# Patient Record
Sex: MALE | Race: BLACK OR AFRICAN AMERICAN | NOT HISPANIC OR LATINO | Employment: UNEMPLOYED | URBAN - METROPOLITAN AREA
[De-identification: names, ages, dates, MRNs, and addresses within clinical notes are randomized per-mention and may not be internally consistent; named-entity substitution may affect disease eponyms.]

---

## 2018-01-12 NOTE — MISCELLANEOUS
Provider Comments  Provider Comments:   Dear Tish Cam,    You missed your appointment with Dr Duke Neff on 10/10/2016; please contact our office to reschedule at [phone]  We understand that many situations arise that occasionally prevents patients from keeping scheduled appointments  It is the policy of [address] [place] [address] [facility] that patients notify us 24 hours in advance if unable to keep a scheduled appointment  Missed appointments jeopardize strong physician-patient relationships  The appointment you missed could have easily been made available to another patient if you had contacted us to cancel  We like to accommodate all of our patients, but when patients miss an appointment it prevents us from being able to help everyone  In the future, we request at least 24 hours' notice of cancellation so we can make your appointment available to someone else in need  Sincerely,  The Physicians and Staff of [address] [place] [address] [facility]        Signatures   Electronically signed by :  Kimberli Santos, ; Oct 10 2016  3:29PM EST                       (Author)

## 2018-01-15 NOTE — RESULT NOTES
Message   biopsy confirmed esophageal ulcer and gastritis  need non-urgent outpatient follow up appointment  please eschedule  thanks  Verified Results  (1) TISSUE EXAM [id number] 03:04PM Nohemi Khalil     Test Name Result Flag Reference   LAB AP CASE REPORT (Report)     Surgical Pathology Report             Case: [id number]                   Authorizing Provider: Loida Ashton MD       Collected:      09/07/2016 1504        Ordering Location:   [facility]    Received:      09/07/2016 [address] [place] [address] Endoscopy                              Pathologist:      Irene Smith MD                                Specimens:  A) - Stomach, gastric bx                                        B) - Esophagus, bx distal esophagus                                  C) - Esophagus, bx upper esophagus,r/o cmv/hsv   LAB AP FINAL DIAGNOSIS (Report)     A  Stomach, gastric biopsy:  -Benign gastric-antral type mucosa with mild chronic inflammation and   reactive hyperplastic surface epithelial changes, consistent with   reactive/ chemical gastritis   -No evidence of ulceration   -No evidence of dysplasia or malignancy   -No H Pylori organisms identified on immunohistochemical stained slide  Control reacted appropriately  B  Esophagus, bx distal esophagus biopsy:  -Fragments of necroinflammatory exudate consistent with an ulcer   -Adjacent fragments of gastroesophageal junction mucosa with moderate   acute, chronic inflammation, ulceration and reactive epithelial changes   -No evidence of dysplasia or malignancy  Note:  Special stains for fungal organisms will be ordered, the results will be   issued in the final report  C  Esophagus, bx upper esophagus,r/o cmv/hsv biopsy:  -Benign squamous epithelium with mild chronic inflammation   -No evidence of Goblet cell metaplasia/ Preciado esophagus   -No evidence of glandular dysplasia or malignancy      Note:  Special stains for fungal & viral organisms will be ordered, the results   will be issued in the final report  Electronically signed by Jessica Valera MD on 9/9/2016 at 2:22 PM   LAB AP SURGICAL ADDITIONAL INFORMATION (Report)     These tests were developed and their performance characteristics   determined by [facility] or [address] [place] [address]  They may not be cleared or approved by the [place]  Food and   Drug Administration  The FDA has determined that such clearance or   approval is not necessary  These tests are used for clinical purposes  They should not be regarded as investigational or for research  This   laboratory has been approved by [place] [address], designated as a high-complexity   laboratory and is qualified to perform these tests  LAB AP GROSS DESCRIPTION (Report)     A  The specimen is received in formalin, labeled with the patient's   name and hospital number, and is designated gastric biopsy  The   specimen consists of several, rubbery, tan-brown tissue fragments   measuring 0 9 x 0 8 x 0 2 cm in aggregate dimension  Entirely submitted  One cassette  B  The specimen is received in formalin, labeled with the patient's name   and hospital number, and is designated biopsy distal esophagus  The   specimen consists of several, rubbery, tan-brown tissue fragments   measuring 0 8 x 0 8 x 0 2 cm in aggregate dimension  Entirely submitted  One cassette  C  The specimen is received in formalin, labeled with the patient's name   and hospital number, and is designated biopsy upper esophagus rule out   CMV/HSV   The specimen consists of several, rubbery, tan-brown tissue   fragments measuring 0 6 x 0 6 x 0 2 cm in aggregate dimension  Entirely   submitted  One cassette  Note: The estimated total formalin fixation time based upon information   provided by the submitting clinician and the standard processing schedule   is 37 25 hours      [facility]   LAB AP ADDENDUM 1 (Report)     The purpose of this supplemental report is to add the result of   immunostains performed on the upper esophagus biopsy    Many fungal organisms morphologically consistent with Candida, consistent   with Candida esophagitis   -No viral organisms seen on CMV, HSV I/II, stains   Controls reacted   appropriately  Addendum electronically signed by Adi Castellon MD on 9/12/2016 at 2:59 PM

## 2018-07-28 ENCOUNTER — APPOINTMENT (EMERGENCY)
Dept: CT IMAGING | Facility: HOSPITAL | Age: 53
End: 2018-07-28
Payer: COMMERCIAL

## 2018-07-28 ENCOUNTER — HOSPITAL ENCOUNTER (EMERGENCY)
Facility: HOSPITAL | Age: 53
Discharge: HOME | End: 2018-07-28
Attending: EMERGENCY MEDICINE | Admitting: EMERGENCY MEDICINE
Payer: COMMERCIAL

## 2018-07-28 VITALS
RESPIRATION RATE: 16 BRPM | SYSTOLIC BLOOD PRESSURE: 159 MMHG | DIASTOLIC BLOOD PRESSURE: 72 MMHG | HEART RATE: 90 BPM | TEMPERATURE: 97.7 F | BODY MASS INDEX: 17.33 KG/M2 | WEIGHT: 109 LBS | OXYGEN SATURATION: 100 %

## 2018-07-28 DIAGNOSIS — R19.7 DIARRHEA: ICD-10-CM

## 2018-07-28 DIAGNOSIS — R11.2 NAUSEA AND VOMITING: ICD-10-CM

## 2018-07-28 DIAGNOSIS — R10.9 ABDOMINAL PAIN: Primary | ICD-10-CM

## 2018-07-28 LAB
ALBUMIN SERPL BCP-MCNC: 4.1 G/DL (ref 3.5–5)
ALP SERPL-CCNC: 104 U/L (ref 46–116)
ALT SERPL W P-5'-P-CCNC: 53 U/L (ref 12–78)
ANION GAP SERPL CALCULATED.3IONS-SCNC: 12 MMOL/L (ref 4–13)
AST SERPL W P-5'-P-CCNC: 42 U/L (ref 5–45)
BASOPHILS # BLD AUTO: 0.04 THOUSANDS/ΜL (ref 0–0.1)
BASOPHILS NFR BLD AUTO: 1 % (ref 0–1)
BILIRUB SERPL-MCNC: 1.11 MG/DL (ref 0.2–1)
BUN SERPL-MCNC: 14 MG/DL (ref 5–25)
CALCIUM SERPL-MCNC: 9.9 MG/DL (ref 8.3–10.1)
CHLORIDE SERPL-SCNC: 97 MMOL/L (ref 100–108)
CO2 SERPL-SCNC: 27 MMOL/L (ref 21–32)
CREAT SERPL-MCNC: 1.04 MG/DL (ref 0.6–1.3)
EOSINOPHIL # BLD AUTO: 0.04 THOUSAND/ΜL (ref 0–0.61)
EOSINOPHIL NFR BLD AUTO: 1 % (ref 0–6)
ERYTHROCYTE [DISTWIDTH] IN BLOOD BY AUTOMATED COUNT: 13.2 % (ref 11.6–15.1)
GFR SERPL CREATININE-BSD FRML MDRD: 94 ML/MIN/1.73SQ M
GLUCOSE SERPL-MCNC: 220 MG/DL (ref 65–140)
HCT VFR BLD AUTO: 37.7 % (ref 36.5–49.3)
HGB BLD-MCNC: 13.3 G/DL (ref 12–17)
LIPASE SERPL-CCNC: 746 U/L (ref 73–393)
LYMPHOCYTES # BLD AUTO: 1.29 THOUSANDS/ΜL (ref 0.6–4.47)
LYMPHOCYTES NFR BLD AUTO: 19 % (ref 14–44)
MCH RBC QN AUTO: 31.9 PG (ref 26.8–34.3)
MCHC RBC AUTO-ENTMCNC: 35.3 G/DL (ref 31.4–37.4)
MCV RBC AUTO: 90 FL (ref 82–98)
MONOCYTES # BLD AUTO: 0.48 THOUSAND/ΜL (ref 0.17–1.22)
MONOCYTES NFR BLD AUTO: 7 % (ref 4–12)
NEUTROPHILS # BLD AUTO: 4.98 THOUSANDS/ΜL (ref 1.85–7.62)
NEUTS SEG NFR BLD AUTO: 73 % (ref 43–75)
PLATELET # BLD AUTO: 203 THOUSANDS/UL (ref 149–390)
PMV BLD AUTO: 12 FL (ref 8.9–12.7)
POTASSIUM SERPL-SCNC: 3.9 MMOL/L (ref 3.5–5.3)
PROT SERPL-MCNC: 8.9 G/DL (ref 6.4–8.2)
RBC # BLD AUTO: 4.17 MILLION/UL (ref 3.88–5.62)
SODIUM SERPL-SCNC: 136 MMOL/L (ref 136–145)
TROPONIN I SERPL-MCNC: <0.02 NG/ML
WBC # BLD AUTO: 6.83 THOUSAND/UL (ref 4.31–10.16)

## 2018-07-28 PROCEDURE — 85025 COMPLETE CBC W/AUTO DIFF WBC: CPT | Performed by: EMERGENCY MEDICINE

## 2018-07-28 PROCEDURE — 83690 ASSAY OF LIPASE: CPT | Performed by: EMERGENCY MEDICINE

## 2018-07-28 PROCEDURE — 80053 COMPREHEN METABOLIC PANEL: CPT | Performed by: EMERGENCY MEDICINE

## 2018-07-28 PROCEDURE — 99284 EMERGENCY DEPT VISIT MOD MDM: CPT

## 2018-07-28 PROCEDURE — 96374 THER/PROPH/DIAG INJ IV PUSH: CPT

## 2018-07-28 PROCEDURE — 96361 HYDRATE IV INFUSION ADD-ON: CPT

## 2018-07-28 PROCEDURE — 84484 ASSAY OF TROPONIN QUANT: CPT | Performed by: EMERGENCY MEDICINE

## 2018-07-28 PROCEDURE — 93005 ELECTROCARDIOGRAM TRACING: CPT

## 2018-07-28 PROCEDURE — 36415 COLL VENOUS BLD VENIPUNCTURE: CPT | Performed by: EMERGENCY MEDICINE

## 2018-07-28 PROCEDURE — 74177 CT ABD & PELVIS W/CONTRAST: CPT

## 2018-07-28 PROCEDURE — 96375 TX/PRO/DX INJ NEW DRUG ADDON: CPT

## 2018-07-28 RX ORDER — KETOROLAC TROMETHAMINE 30 MG/ML
15 INJECTION, SOLUTION INTRAMUSCULAR; INTRAVENOUS ONCE
Status: COMPLETED | OUTPATIENT
Start: 2018-07-28 | End: 2018-07-28

## 2018-07-28 RX ORDER — NAPROXEN 500 MG/1
500 TABLET ORAL 2 TIMES DAILY WITH MEALS
Qty: 30 TABLET | Refills: 0 | Status: SHIPPED | OUTPATIENT
Start: 2018-07-28 | End: 2018-08-10

## 2018-07-28 RX ORDER — ONDANSETRON 2 MG/ML
4 INJECTION INTRAMUSCULAR; INTRAVENOUS ONCE
Status: COMPLETED | OUTPATIENT
Start: 2018-07-28 | End: 2018-07-28

## 2018-07-28 RX ORDER — FAMOTIDINE 20 MG/1
20 TABLET, FILM COATED ORAL DAILY
Status: ON HOLD | COMMUNITY
End: 2018-08-16

## 2018-07-28 RX ORDER — METOCLOPRAMIDE 10 MG/1
10 TABLET ORAL EVERY 6 HOURS
Qty: 10 TABLET | Refills: 0 | Status: SHIPPED | OUTPATIENT
Start: 2018-07-28 | End: 2018-08-10

## 2018-07-28 RX ORDER — MIRTAZAPINE 15 MG/1
15 TABLET, FILM COATED ORAL
Status: ON HOLD | COMMUNITY
End: 2018-08-16

## 2018-07-28 RX ORDER — PANTOPRAZOLE SODIUM 40 MG/1
40 TABLET, DELAYED RELEASE ORAL 2 TIMES DAILY
COMMUNITY
End: 2018-08-16 | Stop reason: HOSPADM

## 2018-07-28 RX ADMIN — IOHEXOL 100 ML: 350 INJECTION, SOLUTION INTRAVENOUS at 10:20

## 2018-07-28 RX ADMIN — KETOROLAC TROMETHAMINE 15 MG: 30 INJECTION, SOLUTION INTRAMUSCULAR at 09:19

## 2018-07-28 RX ADMIN — SODIUM CHLORIDE 1000 ML: 0.9 INJECTION, SOLUTION INTRAVENOUS at 09:16

## 2018-07-28 RX ADMIN — ONDANSETRON 4 MG: 2 INJECTION INTRAMUSCULAR; INTRAVENOUS at 09:19

## 2018-07-28 NOTE — DISCHARGE INSTRUCTIONS
Pancreatitis   WHAT YOU NEED TO KNOW:   Pancreatitis is inflammation of your pancreas  The pancreas is an organ that makes insulin  It also makes enzymes (digestive juices) that help your body digest food  Pancreatitis may be an acute (short-term) problem that happens only once  It may become a chronic (long-term) problem that comes and goes over time  DISCHARGE INSTRUCTIONS:   Return to the emergency department if:   · You have severe pain in your abdomen and you are vomiting  Contact your healthcare provider if:   · You have a fever  · You continue to lose weight  · Your skin or the whites of your eyes turn yellow  · You have questions or concerns about your condition or care  Medicines: You may need any of the following:  · Antibiotics  treat a bacterial infection  · Prescription pain medicine  may be given  Ask your healthcare provider how to take this medicine safely  Some prescription pain medicines contain acetaminophen  Do not take other medicines that contain acetaminophen without talking to your healthcare provider  Too much acetaminophen may cause liver damage  Prescription pain medicine may cause constipation  Ask your healthcare provider how to prevent or treat constipation  · Take your medicine as directed  Contact your healthcare provider if you think your medicine is not helping or if you have side effects  Tell him or her if you are allergic to any medicine  Keep a list of the medicines, vitamins, and herbs you take  Include the amounts, and when and why you take them  Bring the list or the pill bottles to follow-up visits  Carry your medicine list with you in case of an emergency  Self-care:   · Rest  when you feel it is needed  Slowly start to do more each day  Return to your usual activities as directed  · Do not drink any alcohol    If you need help to stop drinking, contact the following organization:   ¨ Alcoholics Anonymous  Web Address: http://www mixon info/      · Ask your healthcare provider or dietitian about the best foods to eat  You may need to eat foods that are low in fat if you have chronic pancreatitis  Follow up with your healthcare provider as directed:  Write down your questions so you remember to ask them during your visits  © 2017 [address] [employer] Information is for End User's use only and may not be sold, redistributed or otherwise used for commercial purposes  All illustrations and images included in CareNotes® are the copyrighted property of [employer]  or [employer]  The above information is an  only  It is not intended as medical advice for individual conditions or treatments  Talk to your doctor, nurse or pharmacist before following any medical regimen to see if it is safe and effective for you

## 2018-07-28 NOTE — ED PROVIDER NOTES
History  Chief Complaint   Patient presents with    Pain     Reports falling yesterday, while trying to get the bus  He hurt his R elbow and R knee  Also c/o of "diabetes problems " C/o pain in his "feet, blood problems, pain all over" and also has left side pain  Has n/v/d, SOB, CP      45 y o  M w/DM p/w left flank pain x 3 weeks  Pt states he's here for pain, but then describes his pain as a "numbness"        History provided by:  Patient   used: No    Flank Pain   Pain location:  L flank  Pain quality comment:  "numbness"  Duration:  3 weeks  Timing:  Constant  Progression:  Unchanged  Chronicity:  New  Relieved by:  None tried  Worsened by:  Nothing  Ineffective treatments:  None tried  Associated symptoms: diarrhea, nausea and vomiting    Associated symptoms: no chills, no constipation, no cough, no dysuria, no fever, no hematuria and no sore throat    Risk factors: has not had multiple surgeries        Prior to Admission Medications   Prescriptions Last Dose Informant Patient Reported? Taking? amLODIPine (NORVASC) 5 mg tablet   Yes Yes   Sig: TAKE 1 TABLET (10 MG TOTAL) BY MOUTH DAILY  atorvastatin (LIPITOR) 40 mg tablet   Yes No   Sig: TAKE 1 TABLET (40 MG TOTAL) BY MOUTH NIGHTLY  TO BETTER CONTROL YOUR CHOLESTEROL   citalopram (CELEXA) 20 mg tablet   Yes Yes   Sig: Take 20 mg by mouth     famotidine (PEPCID) 20 mg tablet   Yes Yes   Sig: Take 20 mg by mouth daily   metFORMIN (GLUCOPHAGE) 500 mg tablet   Yes Yes   Sig: Take 1,000 mg by mouth 2 (two) times a day with meals   metoprolol tartrate (LOPRESSOR) 50 mg tablet   Yes Yes   Sig: TAKE 1 TABLET (50 MG TOTAL) BY MOUTH DAILY   mirtazapine (REMERON) 15 mg tablet   Yes Yes   Sig: Take 15 mg by mouth daily at bedtime   pantoprazole (PROTONIX) 40 mg tablet   Yes Yes   Sig: Take 40 mg by mouth 2 (two) times a day      Facility-Administered Medications: None       Past Medical History:   Diagnosis Date    [facility]     Hyperlipidemia     Hypertension     Migraine        Past Surgical History:   Procedure Laterality Date    ESOPHAGOGASTRODUODENOSCOPY N/A 9/7/2016    Procedure: ESOPHAGOGASTRODUODENOSCOPY (EGD); Surgeon: Cody Penny MD;  Location: [facility]; Service:        History reviewed  No pertinent family history  I have reviewed and agree with the history as documented  Social History   Substance Use Topics    Smoking status: Former Smoker    Smokeless tobacco: Never Used    Alcohol use No        Review of Systems   Constitutional: Negative for chills and fever  HENT: Negative for rhinorrhea and sore throat  Respiratory: Negative for cough  Gastrointestinal: Positive for diarrhea, nausea and vomiting  Negative for abdominal distention, abdominal pain, anal bleeding, blood in stool, constipation and rectal pain  Genitourinary: Positive for flank pain (left)  Negative for dysuria, frequency, hematuria and urgency  Musculoskeletal: Negative for back pain  Skin: Negative for pallor and rash  All other systems reviewed and are negative  Physical Exam  Physical Exam   Constitutional: He is oriented to person, place, and time  He appears well-developed and well-nourished  No distress  HENT:   Head: Normocephalic  Mouth/Throat: Oropharynx is clear and moist and mucous membranes are normal    Neck: Normal range of motion  Neck supple  Cardiovascular: Normal rate, regular rhythm, normal heart sounds and intact distal pulses  Exam reveals no gallop and no friction rub  No murmur heard  Pulmonary/Chest: Effort normal and breath sounds normal  No respiratory distress  He has no wheezes  He has no rales  Abdominal: Soft  Normal appearance and bowel sounds are normal  He exhibits no distension and no mass  There is no hepatosplenomegaly  There is no tenderness  There is no rigidity, no rebound, no guarding, no CVA tenderness, no tenderness at McBurney's point and negative Leon's sign  Lymphadenopathy:     He has no cervical adenopathy  Neurological: He is alert and oriented to person, place, and time  Skin: Skin is warm, dry and intact  No rash noted  No pallor  Nursing note and vitals reviewed  Vital Signs  ED Triage Vitals [07/28/18 0846]   Temperature Pulse Respirations Blood Pressure SpO2   97 7 °F (36 5 °C) (!) 118 18 142/65 100 %      Temp Source Heart Rate Source Patient Position - Orthostatic VS BP Location FiO2 (%)   Oral Monitor Sitting Left arm --      Pain Score       Worst Possible Pain           Vitals:    07/28/18 0846 07/28/18 1108   BP: 142/65 159/72   Pulse: (!) 118 90   Patient Position - Orthostatic VS: Sitting Lying       Visual Acuity      ED Medications  Medications   sodium chloride 0 9 % bolus 1,000 mL (0 mL Intravenous Stopped 7/28/18 1111)   ondansetron (ZOFRAN) injection 4 mg (4 mg Intravenous Given 7/28/18 0919)   ketorolac (TORADOL) injection 15 mg (15 mg Intravenous Given 7/28/18 0919)   iohexol (OMNIPAQUE) 350 MG/ML injection (SINGLE-DOSE) 100 mL (100 mL Intravenous Given 7/28/18 1020)       Diagnostic Studies  Results Reviewed     Procedure Component Value Units Date/Time    Comprehensive metabolic panel [[id number]]  (Abnormal) Collected:  07/28/18 0919    Lab Status:  Final result Specimen:  Blood from Arm, Left Updated:  07/28/18 1012     Sodium 136 mmol/L      Potassium 3 9 mmol/L      Chloride 97 (L) mmol/L      CO2 27 mmol/L      Anion Gap 12 mmol/L      BUN 14 mg/dL      Creatinine 1 04 mg/dL      Glucose 220 (H) mg/dL      Calcium 9 9 mg/dL      AST 42 U/L      ALT 53 U/L      Alkaline Phosphatase 104 U/L      Total Protein 8 9 (H) g/dL      Albumin 4 1 g/dL      Total Bilirubin 1 11 (H) mg/dL      eGFR 94 ml/min/1 73sq m     Narrative:         National Kidney Disease Education Program recommendations are as follows:  GFR calculation is accurate only with a steady state creatinine  Chronic Kidney disease less than 60 ml/min/1 73 sq  meters  Kidney failure less than 15 ml/min/1 73 sq  meters  Lipase [[id number]]  (Abnormal) Collected:  07/28/18 0919    Lab Status:  Final result Specimen:  Blood from Arm, Left Updated:  07/28/18 1012     Lipase 746 (H) u/L     Troponin I [[id number]]  (Normal) Collected:  07/28/18 0919    Lab Status:  Final result Specimen:  Blood from Arm, Left Updated:  07/28/18 0959     Troponin I <0 02 ng/mL     CBC and differential [[id number]]  (Normal) Collected:  07/28/18 0919    Lab Status:  Final result Specimen:  Blood from Arm, Left Updated:  07/28/18 0941     WBC 6 83 Thousand/uL      RBC 4 17 Million/uL      Hemoglobin 13 3 g/dL      Hematocrit 37 7 %      MCV 90 fL      MCH 31 9 pg      MCHC 35 3 g/dL      RDW 13 2 %      MPV 12 0 fL      Platelets 112 Thousands/uL      Neutrophils Relative 73 %      Lymphocytes Relative 19 %      Monocytes Relative 7 %      Eosinophils Relative 1 %      Basophils Relative 1 %      Neutrophils Absolute 4 98 Thousands/µL      Lymphocytes Absolute 1 29 Thousands/µL      Monocytes Absolute 0 48 Thousand/µL      Eosinophils Absolute 0 04 Thousand/µL      Basophils Absolute 0 04 Thousands/µL                  CT abdomen pelvis with contrast   Final Result by Samuel Sanchez MD (07/28 1047)      No acute intra-abdominal pelvic abnormality identified  Stable hepatic cavernous hemangiomas  Several small hypodensities within the renal cortices most likely representing cysts  Single indeterminate nodule with questionable enhancement  Too small to more accurately characterize  Follow-up in 6-12 months suggested  Oval hypodensity also noted within the spleen with enhancement  This appears new compared to the prior studies  Most commonly secondary to hemangioma  Given this is new since 2013, suggest nonemergent ultrasound to further characterize              Workstation performed: [id number]                    Procedures  ECG 12 Lead Documentation  Date/Time: 7/28/2018 9:13 AM  Performed by: Viktoria Crowell  Authorized by: Viktoria Crowell     Indications / Diagnosis:  Left flank pain  Previous ECG:     Previous ECG:  Compared to current    Comparison ECG info:  6/5/2016  Rate:     ECG rate:  99  Rhythm:     Rhythm: sinus rhythm    Ectopy:     Ectopy: none    QRS:     QRS axis:  Normal    QRS intervals:  Normal  ST segments:     ST segments:  Normal           Phone Contacts  ED Phone Contact    ED Course  ED Course as of Jul 28 1115   Sat Jul 28, 2018   1102 Discussed results with pt  I instructed him to make a f/u appt with his PCP regarding his spleen cyst to have an US  Pt agrees            HEART Risk Score      Most Recent Value   History  0 Filed at: 07/28/2018 1002   ECG  0 Filed at: 07/28/2018 1002   Age  1 Filed at: 07/28/2018 1002   Risk Factors  1 Filed at: 07/28/2018 1002   Troponin  0 Filed at: 07/28/2018 1002   Heart Score Risk Calculator   History  0 Filed at: 07/28/2018 1002   ECG  0 Filed at: 07/28/2018 1002   Age  1 Filed at: 07/28/2018 1002   Risk Factors  1 Filed at: 07/28/2018 1002   Troponin  0 Filed at: 07/28/2018 1002   HEART Score  2 Filed at: 07/28/2018 1002   HEART Score  2 Filed at: 07/28/2018 [address] [place] [address]  Number of Diagnoses or Management Options     Amount and/or Complexity of Data Reviewed  Clinical lab tests: reviewed and ordered  Tests in the radiology section of CPT®: ordered and reviewed  Tests in the medicine section of CPT®: reviewed and ordered      CritCare Time    Disposition  Final diagnoses:   Abdominal pain   Nausea and vomiting   Diarrhea     Time reflects when diagnosis was documented in both MDM as applicable and the Disposition within this note     Time User Action Codes Description Comment    7/28/2018 11:02 AM Jamil Strauss 46 [R10 9] Abdominal pain     7/28/2018 11:03 AM Lyla Strauss Add [R11 2] Nausea and vomiting     7/28/2018 11:03 AM Lyla Strauss Add [R19 7] Diarrhea       ED Disposition     ED Disposition Condition Comment    Discharge  Lethia Finders discharge to home/self care  Condition at discharge: Good        Follow-up Information     Follow up With Specialties Details Why Contact Info    An Casarez DO Family Medicine Schedule an appointment as soon as possible for a visit For an ultrasound of your spleen to assess the abnormality on CT scan [address] [facility] [address]  [phone]            Patient's Medications   Discharge Prescriptions    METOCLOPRAMIDE (REGLAN) 10 MG TABLET    Take 1 tablet (10 mg total) by mouth every 6 (six) hours       Start Date: 7/28/2018 End Date: --       Order Dose: 10 mg       Quantity: 10 tablet    Refills: 0    NAPROXEN (NAPROSYN) 500 MG TABLET    Take 1 tablet (500 mg total) by mouth 2 (two) times a day with meals       Start Date: 7/28/2018 End Date: --       Order Dose: 500 mg       Quantity: 30 tablet    Refills: 0     No discharge procedures on file      ED Provider  Electronically Signed by           Jass Horta DO  07/28/18 3112

## 2018-07-31 LAB
ATRIAL RATE: 99 BPM
P AXIS: 66 DEGREES
PR INTERVAL: 190 MS
QRS AXIS: 62 DEGREES
QRSD INTERVAL: 82 MS
QT INTERVAL: 330 MS
QTC INTERVAL: 423 MS
T WAVE AXIS: 48 DEGREES
VENTRICULAR RATE: 99 BPM

## 2018-07-31 PROCEDURE — 93010 ELECTROCARDIOGRAM REPORT: CPT | Performed by: INTERNAL MEDICINE

## 2018-08-10 ENCOUNTER — HOSPITAL ENCOUNTER (INPATIENT)
Facility: HOSPITAL | Age: 53
LOS: 6 days | Discharge: HOME HEALTH | DRG: 282 | End: 2018-08-16
Attending: EMERGENCY MEDICINE | Admitting: HOSPITALIST
Payer: COMMERCIAL

## 2018-08-10 DIAGNOSIS — R74.8 ELEVATED LIPASE: ICD-10-CM

## 2018-08-10 DIAGNOSIS — I10 ACCELERATED HYPERTENSION: ICD-10-CM

## 2018-08-10 DIAGNOSIS — R79.89 ACETONEMIA: ICD-10-CM

## 2018-08-10 DIAGNOSIS — D64.9 ANEMIA: ICD-10-CM

## 2018-08-10 DIAGNOSIS — E11.40 NEUROPATHY DUE TO TYPE 2 DIABETES MELLITUS (HCC): ICD-10-CM

## 2018-08-10 DIAGNOSIS — R53.1 GENERALIZED WEAKNESS: ICD-10-CM

## 2018-08-10 DIAGNOSIS — R26.2 AMBULATORY DYSFUNCTION: Chronic | ICD-10-CM

## 2018-08-10 DIAGNOSIS — E11.65 TYPE 2 DIABETES MELLITUS WITH HYPERGLYCEMIA, WITHOUT LONG-TERM CURRENT USE OF INSULIN (HCC): ICD-10-CM

## 2018-08-10 DIAGNOSIS — K85.90 PANCREATITIS: Primary | ICD-10-CM

## 2018-08-10 DIAGNOSIS — R73.9 HYPERGLYCEMIA: ICD-10-CM

## 2018-08-10 DIAGNOSIS — B36.9 FUNGAL INFECTION OF SKIN: ICD-10-CM

## 2018-08-10 DIAGNOSIS — N39.0 UTI (URINARY TRACT INFECTION): ICD-10-CM

## 2018-08-10 PROBLEM — E78.49 OTHER HYPERLIPIDEMIA: Status: ACTIVE | Noted: 2018-08-10

## 2018-08-10 PROBLEM — E78.49 OTHER HYPERLIPIDEMIA: Chronic | Status: ACTIVE | Noted: 2018-08-10

## 2018-08-10 LAB
ACETONE SERPL-MCNC: ABNORMAL MG/DL
ALBUMIN SERPL BCP-MCNC: 4.2 G/DL (ref 3–5.2)
ALP SERPL-CCNC: 110 U/L (ref 43–122)
ALT SERPL W P-5'-P-CCNC: 51 U/L (ref 9–52)
ANION GAP SERPL CALCULATED.3IONS-SCNC: 7 MMOL/L (ref 5–14)
AST SERPL W P-5'-P-CCNC: 46 U/L (ref 17–59)
BACTERIA UR QL AUTO: ABNORMAL /HPF
BASE EX.OXY STD BLDV CALC-SCNC: 49.2 %
BASE EXCESS BLDV CALC-SCNC: 4 MMOL/L (ref -2.1–2.1)
BILIRUB SERPL-MCNC: 1 MG/DL
BILIRUB UR QL STRIP: NEGATIVE
BUN SERPL-MCNC: 14 MG/DL (ref 5–25)
CALCIUM SERPL-MCNC: 9.5 MG/DL (ref 8.4–10.2)
CHLORIDE SERPL-SCNC: 100 MMOL/L (ref 97–108)
CLARITY UR: ABNORMAL
CO2 SERPL-SCNC: 28 MMOL/L (ref 22–30)
COLOR UR: ABNORMAL
CREAT SERPL-MCNC: 0.61 MG/DL (ref 0.7–1.5)
ERYTHROCYTE [DISTWIDTH] IN BLOOD BY AUTOMATED COUNT: 14.7 %
GFR SERPL CREATININE-BSD FRML MDRD: 131 ML/MIN/1.73SQ M
GLUCOSE SERPL-MCNC: 276 MG/DL (ref 70–99)
GLUCOSE UR STRIP-MCNC: ABNORMAL MG/DL
HCO3 BLDV-SCNC: 30.5 MMOL/L (ref 23–28)
HCT VFR BLD AUTO: 36.5 % (ref 41–53)
HGB BLD-MCNC: 12.3 G/DL (ref 13.5–17.5)
HGB UR QL STRIP.AUTO: 150
KETONES UR STRIP-MCNC: NEGATIVE MG/DL
LACTATE SERPL-SCNC: 1.1 MMOL/L (ref 0.7–2)
LEUKOCYTE ESTERASE UR QL STRIP: NEGATIVE
LIPASE SERPL-CCNC: 3534 U/L (ref 23–300)
MCH RBC QN AUTO: 32.7 PG (ref 26–34)
MCHC RBC AUTO-ENTMCNC: 33.8 G/DL (ref 31–36)
MCV RBC AUTO: 97 FL (ref 80–100)
NITRITE UR QL STRIP: POSITIVE
NON-SQ EPI CELLS URNS QL MICRO: ABNORMAL /HPF
O2 CT BLDV-SCNC: 7.9 ML/DL
PCO2 BLDV: 54 MM HG (ref 41–51)
PH BLDV: 7.36 [PH] (ref 7.35–7.45)
PH UR STRIP.AUTO: 6 [PH] (ref 4.5–8)
PLATELET # BLD AUTO: 201 THOUSANDS/UL (ref 150–450)
PMV BLD AUTO: 10.8 FL (ref 8.9–12.7)
PO2 BLDV: 24 MM HG
POTASSIUM SERPL-SCNC: 3.9 MMOL/L (ref 3.6–5)
PROT SERPL-MCNC: 8 G/DL (ref 5.9–8.4)
PROT UR STRIP-MCNC: >=500 MG/DL
RBC # BLD AUTO: 3.78 MILLION/UL (ref 4.5–5.9)
RBC #/AREA URNS AUTO: ABNORMAL /HPF
SODIUM SERPL-SCNC: 135 MMOL/L (ref 137–147)
SP GR UR STRIP.AUTO: 1.01 (ref 1–1.04)
TRIGL SERPL-MCNC: 145 MG/DL
TROPONIN I SERPL-MCNC: <0.01 NG/ML (ref 0–0.03)
UROBILINOGEN UA: NEGATIVE MG/DL
WBC # BLD AUTO: 6.7 THOUSAND/UL (ref 4.5–11)
WBC #/AREA URNS AUTO: ABNORMAL /HPF

## 2018-08-10 PROCEDURE — 96360 HYDRATION IV INFUSION INIT: CPT

## 2018-08-10 PROCEDURE — 99223 1ST HOSP IP/OBS HIGH 75: CPT | Performed by: HOSPITALIST

## 2018-08-10 PROCEDURE — 85027 COMPLETE CBC AUTOMATED: CPT | Performed by: EMERGENCY MEDICINE

## 2018-08-10 PROCEDURE — 96361 HYDRATE IV INFUSION ADD-ON: CPT

## 2018-08-10 PROCEDURE — 36415 COLL VENOUS BLD VENIPUNCTURE: CPT | Performed by: EMERGENCY MEDICINE

## 2018-08-10 PROCEDURE — 83605 ASSAY OF LACTIC ACID: CPT | Performed by: EMERGENCY MEDICINE

## 2018-08-10 PROCEDURE — 81001 URINALYSIS AUTO W/SCOPE: CPT | Performed by: EMERGENCY MEDICINE

## 2018-08-10 PROCEDURE — 93005 ELECTROCARDIOGRAM TRACING: CPT

## 2018-08-10 PROCEDURE — 80053 COMPREHEN METABOLIC PANEL: CPT | Performed by: EMERGENCY MEDICINE

## 2018-08-10 PROCEDURE — 82948 REAGENT STRIP/BLOOD GLUCOSE: CPT

## 2018-08-10 PROCEDURE — 82805 BLOOD GASES W/O2 SATURATION: CPT | Performed by: EMERGENCY MEDICINE

## 2018-08-10 PROCEDURE — 84478 ASSAY OF TRIGLYCERIDES: CPT | Performed by: EMERGENCY MEDICINE

## 2018-08-10 PROCEDURE — 83690 ASSAY OF LIPASE: CPT | Performed by: EMERGENCY MEDICINE

## 2018-08-10 PROCEDURE — 82009 KETONE BODYS QUAL: CPT | Performed by: EMERGENCY MEDICINE

## 2018-08-10 PROCEDURE — 84484 ASSAY OF TROPONIN QUANT: CPT | Performed by: EMERGENCY MEDICINE

## 2018-08-10 PROCEDURE — 99285 EMERGENCY DEPT VISIT HI MDM: CPT

## 2018-08-10 PROCEDURE — 81003 URINALYSIS AUTO W/O SCOPE: CPT | Performed by: EMERGENCY MEDICINE

## 2018-08-10 RX ORDER — DOCUSATE SODIUM 100 MG/1
100 CAPSULE, LIQUID FILLED ORAL 2 TIMES DAILY
Status: DISCONTINUED | OUTPATIENT
Start: 2018-08-11 | End: 2018-08-16 | Stop reason: HOSPADM

## 2018-08-10 RX ORDER — ATORVASTATIN CALCIUM 40 MG/1
40 TABLET, FILM COATED ORAL
Status: DISCONTINUED | OUTPATIENT
Start: 2018-08-11 | End: 2018-08-12

## 2018-08-10 RX ORDER — POLYETHYLENE GLYCOL 3350 17 G/17G
17 POWDER, FOR SOLUTION ORAL DAILY PRN
Status: DISCONTINUED | OUTPATIENT
Start: 2018-08-10 | End: 2018-08-16 | Stop reason: HOSPADM

## 2018-08-10 RX ORDER — CITALOPRAM 20 MG/1
20 TABLET ORAL DAILY
Status: DISCONTINUED | OUTPATIENT
Start: 2018-08-11 | End: 2018-08-16 | Stop reason: HOSPADM

## 2018-08-10 RX ORDER — AMLODIPINE BESYLATE 5 MG/1
5 TABLET ORAL DAILY
Status: DISCONTINUED | OUTPATIENT
Start: 2018-08-11 | End: 2018-08-12

## 2018-08-10 RX ORDER — SODIUM CHLORIDE 9 MG/ML
75 INJECTION, SOLUTION INTRAVENOUS CONTINUOUS
Status: DISPENSED | OUTPATIENT
Start: 2018-08-10 | End: 2018-08-11

## 2018-08-10 RX ORDER — ONDANSETRON 2 MG/ML
4 INJECTION INTRAMUSCULAR; INTRAVENOUS EVERY 4 HOURS PRN
Status: DISCONTINUED | OUTPATIENT
Start: 2018-08-10 | End: 2018-08-16 | Stop reason: HOSPADM

## 2018-08-10 RX ORDER — 0.9 % SODIUM CHLORIDE 0.9 %
3 VIAL (ML) INJECTION AS NEEDED
Status: DISCONTINUED | OUTPATIENT
Start: 2018-08-10 | End: 2018-08-16 | Stop reason: HOSPADM

## 2018-08-10 RX ORDER — ACETAMINOPHEN 325 MG/1
650 TABLET ORAL EVERY 6 HOURS PRN
Status: DISCONTINUED | OUTPATIENT
Start: 2018-08-10 | End: 2018-08-16 | Stop reason: HOSPADM

## 2018-08-10 RX ORDER — PANTOPRAZOLE SODIUM 40 MG/1
40 INJECTION, POWDER, FOR SOLUTION INTRAVENOUS
Status: DISCONTINUED | OUTPATIENT
Start: 2018-08-11 | End: 2018-08-13

## 2018-08-10 RX ORDER — SENNOSIDES 8.6 MG
1 TABLET ORAL DAILY
Status: DISCONTINUED | OUTPATIENT
Start: 2018-08-11 | End: 2018-08-16 | Stop reason: HOSPADM

## 2018-08-10 RX ORDER — MIRTAZAPINE 15 MG/1
15 TABLET, FILM COATED ORAL
Status: DISCONTINUED | OUTPATIENT
Start: 2018-08-10 | End: 2018-08-16 | Stop reason: HOSPADM

## 2018-08-10 RX ORDER — ONDANSETRON 2 MG/ML
4 INJECTION INTRAMUSCULAR; INTRAVENOUS ONCE
Status: DISCONTINUED | OUTPATIENT
Start: 2018-08-10 | End: 2018-08-16 | Stop reason: HOSPADM

## 2018-08-10 RX ADMIN — METOPROLOL TARTRATE 25 MG: 25 TABLET, FILM COATED ORAL at 23:57

## 2018-08-10 RX ADMIN — HUMAN INSULIN 10 UNITS: 100 INJECTION, SOLUTION SUBCUTANEOUS at 21:28

## 2018-08-10 RX ADMIN — SODIUM CHLORIDE 1000 ML: 9 INJECTION, SOLUTION INTRAVENOUS at 19:15

## 2018-08-10 RX ADMIN — SODIUM CHLORIDE 1000 ML: 9 INJECTION, SOLUTION INTRAVENOUS at 21:30

## 2018-08-10 RX ADMIN — CEFTRIAXONE 1000 MG: 1 INJECTION, SOLUTION INTRAVENOUS at 21:29

## 2018-08-10 NOTE — ED PROVIDER NOTES
Pt Name: Kenji Garza  MRN: [id number]  [place] 1965  Age/Sex: 45 y o  male  Date of evaluation: 8/10/2018  PCP: Elmer Chapin DO    CHIEF COMPLAINT    Chief Complaint   Patient presents with    Dizziness    Weakness - Generalized    Fall         HPI    45 y o  male presenting with several weeks of dizziness, weakness, repeated falls  Patient states that he has diabetic neuropathy in his seen a doctor for that but that it is not getting better even with medication  He also notes nausea and vomiting today, only able to eat a bite or two of a sandwich today resulting in vomiting  Patient is a diabetic and has not taken insulin for months, stating that he has had some issues when his insulin changed in simply stop taking it  He has not been checking his blood sugar either  He also complains of mild dull abdominal pain, in the left upper quadrant and epigastric region, worse with meals and better at rest, nonradiating      HPI      Past Medical and Surgical History    Past Medical History:   Diagnosis Date    Diabetes mellitus ([address] )     GERD (gastroesophageal reflux disease)     Hyperlipidemia     Hypertension     Migraine     Psychiatric disorder        Past Surgical History:   Procedure Laterality Date    ESOPHAGOGASTRODUODENOSCOPY N/A 9/7/2016    Procedure: ESOPHAGOGASTRODUODENOSCOPY (EGD); Surgeon: Daniel King MD;  Location: [facility]; Service:        History reviewed  No pertinent family history  Social History   Substance Use Topics    Smoking status: Former Smoker    Smokeless tobacco: Never Used    Alcohol use No           Allergies    Allergies   Allergen Reactions    Lisinopril Swelling       Home Medications    Prior to Admission medications    Medication Sig Start Date End Date Taking? Authorizing Provider   amLODIPine (NORVASC) 5 mg tablet TAKE 1 TABLET (10 MG TOTAL) BY MOUTH DAILY   4/13/16   Historical Provider, MD   atorvastatin (LIPITOR) 40 mg tablet TAKE 1 TABLET (40 MG TOTAL) BY MOUTH NIGHTLY  TO BETTER CONTROL YOUR CHOLESTEROL 3/30/16   Historical Provider, MD   citalopram (CELEXA) 20 mg tablet Take 20 mg by mouth  7/5/13   Historical Provider, MD   famotidine (PEPCID) 20 mg tablet Take 20 mg by mouth daily    Historical Provider, MD   metFORMIN (GLUCOPHAGE) 500 mg tablet Take 1,000 mg by mouth 2 (two) times a day with meals    Historical Provider, MD   metoclopramide (REGLAN) 10 mg tablet Take 1 tablet (10 mg total) by mouth every 6 (six) hours 7/28/18   Lyla Strauss, DO   metoprolol tartrate (LOPRESSOR) 50 mg tablet TAKE 1 TABLET (50 MG TOTAL) BY MOUTH DAILY 3/21/16   Historical Provider, MD   mirtazapine (REMERON) 15 mg tablet Take 15 mg by mouth daily at bedtime    Historical Provider, MD   naproxen (NAPROSYN) 500 mg tablet Take 1 tablet (500 mg total) by mouth 2 (two) times a day with meals 7/28/18   Lyla Strauss, DO   pantoprazole (PROTONIX) 40 mg tablet Take 40 mg by mouth 2 (two) times a day    Historical Provider, MD           Review of Systems    Review of Systems   Constitutional: Negative for appetite change, chills and diaphoresis  HENT: Negative for drooling, facial swelling, trouble swallowing and voice change  Respiratory: Negative for apnea, shortness of breath and wheezing  Cardiovascular: Negative for chest pain and leg swelling  Gastrointestinal: Positive for abdominal pain, nausea and vomiting  Negative for abdominal distention and diarrhea  Genitourinary: Negative for dysuria and urgency  Musculoskeletal: Negative for arthralgias, back pain, gait problem and neck pain  Skin: Negative for color change, rash and wound  Neurological: Negative for seizures, speech difficulty, weakness and headaches  Psychiatric/Behavioral: Negative for agitation, behavioral problems and dysphoric mood  The patient is not nervous/anxious  All other systems reviewed and negative      Physical Exam      ED Triage Vitals   Temperature Pulse Respirations Blood Pressure SpO2   08/10/18 1812 08/10/18 1812 08/10/18 1812 08/10/18 1812 08/10/18 1812   (!) 97 4 °F (36 3 °C) 100 18 (!) 181/101 100 %      Temp Source Heart Rate Source Patient Position - Orthostatic VS BP Location FiO2 (%)   08/10/18 1812 08/10/18 1812 08/10/18 2200 08/10/18 1812 --   Temporal Monitor Lying Left arm       Pain Score       08/10/18 2228       8               Physical Exam   Constitutional: He is oriented to person, place, and time  He appears well-developed and well-nourished  HENT:   Head: Normocephalic and atraumatic  Mucous membranes dry   Eyes: Conjunctivae and EOM are normal  Pupils are equal, round, and reactive to light  Neck: Normal range of motion  Neck supple  No tracheal deviation present  Cardiovascular: Normal rate, regular rhythm, normal heart sounds and intact distal pulses  No murmur heard  Pulmonary/Chest: Effort normal and breath sounds normal  No stridor  No respiratory distress  He has no wheezes  He has no rales  Abdominal: Soft  He exhibits no distension  There is tenderness  There is no rebound and no guarding  Mild left upper quadrant tenderness   Musculoskeletal: Normal range of motion  He exhibits no edema or deformity  Neurological: He is alert and oriented to person, place, and time  Skin: Skin is warm and dry  No rash noted  Psychiatric: He has a normal mood and affect   His behavior is normal  Judgment and thought content normal             Diagnostic Results      Labs:    Results for orders placed or performed during the hospital encounter of 08/10/18   CBC   Result Value Ref Range    WBC 6 70 4 50 - 11 00 Thousand/uL    RBC 3 78 (L) 4 50 - 5 90 Million/uL    Hemoglobin 12 3 (L) 13 5 - 17 5 g/dL    Hematocrit 36 5 (L) 41 0 - 53 0 %    MCV 97 80 - 100 fL    MCH 32 7 26 0 - 34 0 pg    MCHC 33 8 31 0 - 36 0 g/dL    RDW 14 7 <15 3 %    Platelets 187 938 - 135 Thousands/uL    MPV 10 8 8 9 - 12 7 fL   Lipase   Result Value Ref Range Lipase 3,534 (H) 23 - 300 u/L   Acetone   Result Value Ref Range    Acetone, Bld 1+ (A) Negative   Comprehensive metabolic panel   Result Value Ref Range    Sodium 135 (L) 137 - 147 mmol/L    Potassium 3 9 3 6 - 5 0 mmol/L    Chloride 100 97 - 108 mmol/L    CO2 28 22 - 30 mmol/L    Anion Gap 7 5 - 14 mmol/L    BUN 14 5 - 25 mg/dL    Creatinine 0 61 (L) 0 70 - 1 50 mg/dL    Glucose 276 (H) 70 - 99 mg/dL    Calcium 9 5 8 4 - 10 2 mg/dL    AST 46 17 - 59 U/L    ALT 51 9 - 52 U/L    Alkaline Phosphatase 110 43 - 122 U/L    Total Protein 8 0 5 9 - 8 4 g/dL    Albumin 4 2 3 0 - 5 2 g/dL    Total Bilirubin 1 00 <1 30 mg/dL    eGFR 131 >60 ml/min/1 73sq m   Lactic acid, plasma   Result Value Ref Range    LACTIC ACID 1 1 0 7 - 2 0 mmol/L   Troponin I   Result Value Ref Range    Troponin I <0 01 0 00 - 0 03 ng/mL   UA w Reflex to Microscopic w Reflex to Culture   Result Value Ref Range    Color, UA Straw Straw, Yellow, Pale Yellow    Clarity, UA Slightly Cloudy (A) Clear, Other    Specific Gravity, UA 1 015 1 003 - 1 040    pH, UA 6 0 4 5 - 8 0    Leukocytes, UA Negative Negative    Nitrite, UA Positive (A) Negative    Protein, UA >=500 (A) Negative mg/dl    Glucose, UA >=1000 (1%) (A) Negative mg/dl    Ketones, UA Negative Negative mg/dl    Bilirubin, UA Negative Negative    Blood,  0 (A) Negative    UROBILINOGEN UA Negative 1 0, Negative mg/dL   Blood gas, venous   Result Value Ref Range    pH, Jones 7 360 7 350 - 7 450    pCO2, Jones 54 0 (H) 41 0 - 51 0 mm Hg    pO2, Jones 24 0 (L) >=40 0 mm Hg    HCO3, Jones 30 5 (H) 23 - 28 mmol/L    Base Excess, Jones 4 0 (H) -2 1 - 2 1 mmol/L    O2 Content, Jones 7 9 ml/dL    O2 HGB, VENOUS 49 2 (L) >75 0 %   Urine Microscopic   Result Value Ref Range    RBC, UA 0-1 (A) None Seen, 0-5 /hpf    WBC, UA 1-2 (A) None Seen, 0-5, 5-55, 5-65 /hpf    Epithelial Cells Occasional None Seen, Occasional /hpf    Bacteria, UA Innumerable (A) None Seen, Occasional /hpf   Triglycerides   Result Value Ref Range    Triglycerides 145 <150 mg/dL       All labs reviewed and utilized in the medical decision making process    Radiology:    No orders to display       All radiology studies independently viewed by me and interpreted by the radiologist     Procedure    Procedures    CritCare Time      ED Course of Care and Re-Assessments      Symptoms improved with saline, Zofran, insulin  Infected appearing UA with nitrate positive prompted ceftriaxone to treat for same      Medications   sodium chloride (PF) 0 9 % injection 3 mL (not administered)   ondansetron (ZOFRAN) injection 4 mg (not administered)   amLODIPine (NORVASC) tablet 5 mg (not administered)   atorvastatin (LIPITOR) tablet 40 mg (not administered)   citalopram (CeleXA) tablet 20 mg (not administered)   metoprolol tartrate (LOPRESSOR) tablet 25 mg (not administered)   mirtazapine (REMERON) tablet 15 mg (not administered)   sodium chloride 0 9 % infusion (not administered)   docusate sodium (COLACE) capsule 100 mg (not administered)   senna (SENOKOT) tablet 8 6 mg (not administered)   polyethylene glycol (MIRALAX) packet 17 g (not administered)   ondansetron (ZOFRAN) injection 4 mg (not administered)   enoxaparin (LOVENOX) subcutaneous injection 40 mg (not administered)   insulin lispro (HumaLOG) 100 units/mL subcutaneous injection 1-5 Units (not administered)   acetaminophen (TYLENOL) tablet 650 mg (not administered)   pantoprazole (PROTONIX) injection 40 mg (not administered)   sodium chloride 0 9 % bolus 1,000 mL (0 mL Intravenous Stopped 8/10/18 2208)   insulin regular (HumuLIN R,NovoLIN R) injection 10 Units (10 Units Subcutaneous Given 8/10/18 2128)   sodium chloride 0 9 % bolus 1,000 mL (1,000 mL Intravenous New Bag 8/10/18 2130)   cefTRIAXone (ROCEPHIN) IVPB (premix) 1,000 mg (0 mg Intravenous Stopped 8/10/18 2208)           FINAL IMPRESSION    Final diagnoses:   Pancreatitis   Hyperglycemia   UTI (urinary tract infection)   Anemia   Acetonemia DISPOSITION/PLAN    15-year-old male with history and symptoms above  Vital signs remarkable for hypertension and borderline tachycardia, examination nonspecific as above but concerning for  dehydration based on dry mucous membranes  Based on history and examination and in setting of diabetes with noncompliance, labs obtained and returned remarkable for multiple abnormalities as above to include acetone in blood without the presence of acidemia, UA concerning for UTI, anemia, and elevated lipase concerning for pancreatitis in setting of nausea vomiting and abdominal pain  Insulin and ceftriaxone administered in the ER along with 2 L of normal saline and Zofran  Patient admitted to Dr Jessica Gutierrez of Internal Medicine for further treatment of pancreatitis and re-initiation of insulin therapy, hemodynamically stable and comfortable at that time  Triglycerides ordered for emergency department per Dr Jessica Gutierrez request, that lab to be followed up by her on inpatient basis    Time reflects when diagnosis was documented in both MDM as applicable and the Disposition within this note     Time User Action Codes Description Comment    8/10/2018  9:12 PM Nolvia Worley Add [K85 90] Pancreatitis     8/10/2018  9:12 PM Nolvia Worley Add [R73 9] Hyperglycemia     8/10/2018  9:12 PM Nolvia Worley Add [N39 0] UTI (urinary tract infection)     8/10/2018  9:12 PM Nolvia Worley Add [D64 9] Anemia     8/10/2018  9:12 PM Nolvia Worley Add [R79 89] Acetonemia     8/10/2018 10:26 PM Alonso Mathew Add [E11 65] Type 2 diabetes mellitus with hyperglycemia, without long-term current use of insulin ([facility] [address] )     8/10/2018 10:26 PM Arun President Add [R74 8] Elevated lipase     8/10/2018 10:26 PM Natty Bloom Add [I10] Accelerated hypertension       ED Disposition     ED Disposition Condition Comment    Admit  Case was discussed with Dr Jessica Gutierrez and the patient's admission status was agreed to be Admission Status: inpatient status to the service of Dr David Osman   Follow-up Information    None           PATIENT REFERRED TO:    No follow-up provider specified  DISCHARGE MEDICATIONS:    Current Discharge Medication List      CONTINUE these medications which have NOT CHANGED    Details   amLODIPine (NORVASC) 5 mg tablet TAKE 1 TABLET (10 MG TOTAL) BY MOUTH DAILY  atorvastatin (LIPITOR) 40 mg tablet TAKE 1 TABLET (40 MG TOTAL) BY MOUTH NIGHTLY  TO BETTER CONTROL YOUR CHOLESTEROL      citalopram (CELEXA) 20 mg tablet Take 20 mg by mouth daily        famotidine (PEPCID) 20 mg tablet Take 20 mg by mouth daily      metFORMIN (GLUCOPHAGE) 500 mg tablet Take 1,000 mg by mouth 2 (two) times a day with meals      metoprolol tartrate (LOPRESSOR) 50 mg tablet TAKE 1 TABLET (50 MG TOTAL) BID      mirtazapine (REMERON) 15 mg tablet Take 15 mg by mouth daily at bedtime      pantoprazole (PROTONIX) 40 mg tablet Take 40 mg by mouth 2 (two) times a day             No discharge procedures on file           MD Nam Infante MD  08/10/18 9205

## 2018-08-10 NOTE — ED NOTES
Called patient's niece as per patient's request  Call unsuccessful, and voicemail was full for telephone number provided       Lo Denson RN  08/10/18 1821

## 2018-08-10 NOTE — ED TRIAGE NOTES
EMS states he has been dizzy, weak, falling a lot at home (per pt, last time he fell was last week), L flank into rib pain  Pt states he had some chest pain last night but states it could have been gas  Denies blood in urine or burning with urination   Pt states he doesn't want to go home, states he is tired of being sick

## 2018-08-11 LAB
ALBUMIN SERPL BCP-MCNC: 3.2 G/DL (ref 3–5.2)
ALP SERPL-CCNC: 81 U/L (ref 43–122)
ALT SERPL W P-5'-P-CCNC: 42 U/L (ref 9–52)
ANION GAP SERPL CALCULATED.3IONS-SCNC: 2 MMOL/L (ref 5–14)
AST SERPL W P-5'-P-CCNC: 39 U/L (ref 17–59)
BASOPHILS # BLD AUTO: 0 THOUSANDS/ΜL (ref 0–0.1)
BASOPHILS NFR BLD AUTO: 1 % (ref 0–1)
BILIRUB SERPL-MCNC: 0.6 MG/DL
BUN SERPL-MCNC: 10 MG/DL (ref 5–25)
CALCIUM SERPL-MCNC: 8.4 MG/DL (ref 8.4–10.2)
CHLORIDE SERPL-SCNC: 106 MMOL/L (ref 97–108)
CHOLEST SERPL-MCNC: 176 MG/DL
CO2 SERPL-SCNC: 29 MMOL/L (ref 22–30)
CREAT SERPL-MCNC: 0.59 MG/DL (ref 0.7–1.5)
EOSINOPHIL # BLD AUTO: 0 THOUSAND/ΜL (ref 0–0.4)
EOSINOPHIL NFR BLD AUTO: 1 % (ref 0–6)
ERYTHROCYTE [DISTWIDTH] IN BLOOD BY AUTOMATED COUNT: 14.5 %
EST. AVERAGE GLUCOSE BLD GHB EST-MCNC: 321 MG/DL
GFR SERPL CREATININE-BSD FRML MDRD: 133 ML/MIN/1.73SQ M
GLUCOSE SERPL-MCNC: 104 MG/DL (ref 70–99)
GLUCOSE SERPL-MCNC: 113 MG/DL (ref 70–99)
GLUCOSE SERPL-MCNC: 116 MG/DL (ref 70–99)
GLUCOSE SERPL-MCNC: 121 MG/DL (ref 70–99)
GLUCOSE SERPL-MCNC: 126 MG/DL (ref 70–99)
GLUCOSE SERPL-MCNC: 74 MG/DL (ref 70–99)
HBA1C MFR BLD: 12.8 % (ref 4.2–6.3)
HCT VFR BLD AUTO: 30.5 % (ref 41–53)
HDLC SERPL-MCNC: 46 MG/DL (ref 40–59)
HGB BLD-MCNC: 10.5 G/DL (ref 13.5–17.5)
INR PPP: 0.87 (ref 0.89–1.1)
LDLC SERPL CALC-MCNC: 115 MG/DL
LIPASE SERPL-CCNC: 4811 U/L (ref 23–300)
LYMPHOCYTES # BLD AUTO: 1.1 THOUSANDS/ΜL (ref 0.5–4)
LYMPHOCYTES NFR BLD AUTO: 18 % (ref 20–50)
MAGNESIUM SERPL-MCNC: 1.6 MG/DL (ref 1.6–2.3)
MCH RBC QN AUTO: 32.8 PG (ref 26–34)
MCHC RBC AUTO-ENTMCNC: 34.3 G/DL (ref 31–36)
MCV RBC AUTO: 96 FL (ref 80–100)
MONOCYTES # BLD AUTO: 0.4 THOUSAND/ΜL (ref 0.2–0.9)
MONOCYTES NFR BLD AUTO: 7 % (ref 1–10)
NEUTROPHILS # BLD AUTO: 4.5 THOUSANDS/ΜL (ref 1.8–7.8)
NEUTS SEG NFR BLD AUTO: 74 % (ref 45–65)
NONHDLC SERPL-MCNC: 130 MG/DL
PHOSPHATE SERPL-MCNC: 3.2 MG/DL (ref 2.5–4.8)
PLATELET # BLD AUTO: 168 THOUSANDS/UL (ref 150–450)
PLATELET # BLD AUTO: 220 THOUSANDS/UL (ref 150–450)
PMV BLD AUTO: 10.2 FL (ref 8.9–12.7)
POTASSIUM SERPL-SCNC: 3.4 MMOL/L (ref 3.6–5)
PROT SERPL-MCNC: 6.8 G/DL (ref 5.9–8.4)
PROTHROMBIN TIME: 9.3 SECONDS (ref 9.5–11.6)
RBC # BLD AUTO: 3.19 MILLION/UL (ref 4.5–5.9)
SODIUM SERPL-SCNC: 137 MMOL/L (ref 137–147)
TRIGL SERPL-MCNC: 74 MG/DL
TROPONIN I SERPL-MCNC: 0.02 NG/ML (ref 0–0.03)
TSH SERPL DL<=0.05 MIU/L-ACNC: 1.58 UIU/ML (ref 0.47–4.68)
WBC # BLD AUTO: 6.1 THOUSAND/UL (ref 4.5–11)

## 2018-08-11 PROCEDURE — 83690 ASSAY OF LIPASE: CPT | Performed by: HOSPITALIST

## 2018-08-11 PROCEDURE — 82948 REAGENT STRIP/BLOOD GLUCOSE: CPT

## 2018-08-11 PROCEDURE — 99232 SBSQ HOSP IP/OBS MODERATE 35: CPT | Performed by: HOSPITALIST

## 2018-08-11 PROCEDURE — 84100 ASSAY OF PHOSPHORUS: CPT | Performed by: HOSPITALIST

## 2018-08-11 PROCEDURE — 80053 COMPREHEN METABOLIC PANEL: CPT | Performed by: HOSPITALIST

## 2018-08-11 PROCEDURE — 85610 PROTHROMBIN TIME: CPT | Performed by: HOSPITALIST

## 2018-08-11 PROCEDURE — 84443 ASSAY THYROID STIM HORMONE: CPT | Performed by: HOSPITALIST

## 2018-08-11 PROCEDURE — 85049 AUTOMATED PLATELET COUNT: CPT | Performed by: HOSPITALIST

## 2018-08-11 PROCEDURE — C9113 INJ PANTOPRAZOLE SODIUM, VIA: HCPCS | Performed by: HOSPITALIST

## 2018-08-11 PROCEDURE — 85025 COMPLETE CBC W/AUTO DIFF WBC: CPT | Performed by: HOSPITALIST

## 2018-08-11 PROCEDURE — 84484 ASSAY OF TROPONIN QUANT: CPT | Performed by: HOSPITALIST

## 2018-08-11 PROCEDURE — 83036 HEMOGLOBIN GLYCOSYLATED A1C: CPT | Performed by: HOSPITALIST

## 2018-08-11 PROCEDURE — 80061 LIPID PANEL: CPT | Performed by: HOSPITALIST

## 2018-08-11 PROCEDURE — 83735 ASSAY OF MAGNESIUM: CPT | Performed by: HOSPITALIST

## 2018-08-11 RX ORDER — GABAPENTIN 100 MG/1
100 CAPSULE ORAL 2 TIMES DAILY
Status: DISCONTINUED | OUTPATIENT
Start: 2018-08-11 | End: 2018-08-15

## 2018-08-11 RX ORDER — TRAMADOL HYDROCHLORIDE 50 MG/1
50 TABLET ORAL EVERY 6 HOURS PRN
Status: DISCONTINUED | OUTPATIENT
Start: 2018-08-11 | End: 2018-08-13

## 2018-08-11 RX ORDER — SODIUM CHLORIDE 9 MG/ML
75 INJECTION, SOLUTION INTRAVENOUS CONTINUOUS
Status: DISPENSED | OUTPATIENT
Start: 2018-08-11 | End: 2018-08-12

## 2018-08-11 RX ORDER — HYDRALAZINE HYDROCHLORIDE 20 MG/ML
5 INJECTION INTRAMUSCULAR; INTRAVENOUS EVERY 6 HOURS PRN
Status: DISCONTINUED | OUTPATIENT
Start: 2018-08-11 | End: 2018-08-13

## 2018-08-11 RX ORDER — METOPROLOL TARTRATE 50 MG/1
50 TABLET, FILM COATED ORAL EVERY 12 HOURS SCHEDULED
Status: DISCONTINUED | OUTPATIENT
Start: 2018-08-11 | End: 2018-08-16 | Stop reason: HOSPADM

## 2018-08-11 RX ADMIN — ACETAMINOPHEN 650 MG: 325 TABLET ORAL at 00:06

## 2018-08-11 RX ADMIN — DOCUSATE SODIUM 100 MG: 100 CAPSULE, LIQUID FILLED ORAL at 08:23

## 2018-08-11 RX ADMIN — CITALOPRAM HYDROBROMIDE 20 MG: 20 TABLET ORAL at 08:23

## 2018-08-11 RX ADMIN — METOPROLOL TARTRATE 50 MG: 50 TABLET ORAL at 22:02

## 2018-08-11 RX ADMIN — GABAPENTIN 100 MG: 100 CAPSULE ORAL at 17:45

## 2018-08-11 RX ADMIN — SODIUM CHLORIDE 75 ML/HR: 9 INJECTION, SOLUTION INTRAVENOUS at 14:40

## 2018-08-11 RX ADMIN — MAGNESIUM OXIDE TAB 400 MG (241.3 MG ELEMENTAL MG) 400 MG: 400 (241.3 MG) TAB at 17:45

## 2018-08-11 RX ADMIN — ENOXAPARIN SODIUM 40 MG: 40 INJECTION SUBCUTANEOUS at 08:23

## 2018-08-11 RX ADMIN — SENNOSIDES 8.6 MG: 8.6 TABLET, FILM COATED ORAL at 08:23

## 2018-08-11 RX ADMIN — METOPROLOL TARTRATE 50 MG: 50 TABLET ORAL at 08:23

## 2018-08-11 RX ADMIN — GABAPENTIN 100 MG: 100 CAPSULE ORAL at 08:23

## 2018-08-11 RX ADMIN — ATORVASTATIN CALCIUM 40 MG: 40 TABLET, FILM COATED ORAL at 17:44

## 2018-08-11 RX ADMIN — SODIUM CHLORIDE 125 ML/HR: 9 INJECTION, SOLUTION INTRAVENOUS at 04:41

## 2018-08-11 RX ADMIN — MAGNESIUM OXIDE TAB 400 MG (241.3 MG ELEMENTAL MG) 400 MG: 400 (241.3 MG) TAB at 10:25

## 2018-08-11 RX ADMIN — MIRTAZAPINE 15 MG: 15 TABLET, FILM COATED ORAL at 00:09

## 2018-08-11 RX ADMIN — DOCUSATE SODIUM 100 MG: 100 CAPSULE, LIQUID FILLED ORAL at 17:46

## 2018-08-11 RX ADMIN — TRAMADOL HYDROCHLORIDE 50 MG: 50 TABLET, COATED ORAL at 06:38

## 2018-08-11 RX ADMIN — MIRTAZAPINE 15 MG: 15 TABLET, FILM COATED ORAL at 22:01

## 2018-08-11 RX ADMIN — PANTOPRAZOLE SODIUM 40 MG: 40 INJECTION, POWDER, FOR SOLUTION INTRAVENOUS at 08:23

## 2018-08-11 RX ADMIN — AMLODIPINE BESYLATE 5 MG: 5 TABLET ORAL at 08:23

## 2018-08-11 NOTE — ASSESSMENT & PLAN NOTE
Secondary to medical noncompliance  Continue to monitor blood pressure   amlodipine 5 mg daily and metoprolol   Hydralazine IV p r n

## 2018-08-11 NOTE — CONSULTS
Patient MRN: [id number]  Date of Service: 8/11/2018  Referring Physician: Shaka Brower MD  Provider Creating Note: Dana Cody PA-C  PCP: Drema Schlatter  Reason for Consult:  Pancreatitis  HPI  Kaye Fraire is a 45 y o  male who was admitted with Generalized weakness  He reports several weeks of left-sided abdominal pain that he describes as a balloon sensation with numbness  The pain seems to be worse after he eats and is unrelated to bowel movements  He has had nausea with dry heaves  Denies hematemesis  He had a CT of the abdomen pelvis on 07/28 for this pain that showed stable liver hemangiomas  The pancreas was unremarkable  His lipase was 746 then, it was 3534 this admission  He denies drinking alcohol  He has a long history of heartburn, reflux and belching  He had an upper endoscopy 09/2016 that showed severe ulcerative esophagitis, candidiasis, mild gastritis and duodenitis  He was treated with Diflucan  He currently denies any dysphagia  Past Medical History:   Diagnosis Date    Diabetes mellitus ([address] )     GERD (gastroesophageal reflux disease)     Hyperlipidemia     Hypertension     Migraine     Psychiatric disorder      Past Surgical History:   Procedure Laterality Date    ESOPHAGOGASTRODUODENOSCOPY N/A 9/7/2016    Procedure: ESOPHAGOGASTRODUODENOSCOPY (EGD); Surgeon: Polina Byrne MD;  Location: [facility]; Service:      Medications  Home Medications:   Prior to Admission medications    Medication Sig Start Date End Date Taking? Authorizing Provider   famotidine (PEPCID) 20 mg tablet Take 20 mg by mouth daily   Yes Historical Provider, MD   amLODIPine (NORVASC) 5 mg tablet TAKE 1 TABLET (10 MG TOTAL) BY MOUTH DAILY  4/13/16   Historical Provider, MD   atorvastatin (LIPITOR) 40 mg tablet TAKE 1 TABLET (40 MG TOTAL) BY MOUTH NIGHTLY   TO BETTER CONTROL YOUR CHOLESTEROL 3/30/16   Historical Provider, MD   citalopram (CELEXA) 20 mg tablet Take 20 mg by mouth daily   7/5/13 Historical Provider, MD   metFORMIN (GLUCOPHAGE) 500 mg tablet Take 1,000 mg by mouth 2 (two) times a day with meals    Historical Provider, MD   metoprolol tartrate (LOPRESSOR) 50 mg tablet TAKE 1 TABLET (50 MG TOTAL) BID 3/21/16   Historical Provider, MD   mirtazapine (REMERON) 15 mg tablet Take 15 mg by mouth daily at bedtime    Historical Provider, MD   pantoprazole (PROTONIX) 40 mg tablet Take 40 mg by mouth 2 (two) times a day    Historical Provider, MD       Inhouse Medications    Current Facility-Administered Medications:     acetaminophen (TYLENOL) tablet 650 mg, 650 mg, Oral, Q6H PRN, 650 mg at 08/11/18 0006    amLODIPine (NORVASC) tablet 5 mg, 5 mg, Oral, Daily, 5 mg at 08/11/18 0823    atorvastatin (LIPITOR) tablet 40 mg, 40 mg, Oral, Daily With Dinner    citalopram (CeleXA) tablet 20 mg, 20 mg, Oral, Daily, 20 mg at 08/11/18 3271    docusate sodium (COLACE) capsule 100 mg, 100 mg, Oral, BID, 100 mg at 08/11/18 0823    enoxaparin (LOVENOX) subcutaneous injection 40 mg, 40 mg, Subcutaneous, Daily, 40 mg at 08/11/18 6983    gabapentin (NEURONTIN) capsule 100 mg, 100 mg, Oral, BID, 100 mg at 08/11/18 9314    hydrALAZINE (APRESOLINE) injection 5 mg, 5 mg, Intravenous, Q6H PRN    insulin lispro (HumaLOG) 100 units/mL subcutaneous injection 1-5 Units, 1-5 Units, Subcutaneous, Q6H [address] **AND** Fingerstick Glucose (POCT), , , Q6H    magnesium oxide (MAG-OX) tablet 400 mg, 400 mg, Oral, BID, 400 mg at 08/11/18 1025    metoprolol tartrate (LOPRESSOR) tablet 50 mg, 50 mg, Oral, Q12H [address], 50 mg at 08/11/18 0823    mirtazapine (REMERON) tablet 15 mg, 15 mg, Oral, HS, 15 mg at 08/11/18 0009    ondansetron (ZOFRAN) injection 4 mg, 4 mg, Intravenous, Once    ondansetron (ZOFRAN) injection 4 mg, 4 mg, Intravenous, Q4H PRN    pantoprazole (PROTONIX) injection 40 mg, 40 mg, Intravenous, Q24H SHERRY, 40 mg at 08/11/18 0823    polyethylene glycol (MIRALAX) packet 17 g, 17 g, Oral, Daily PRN    senna (SENOKOT) tablet 8 6 mg, 1 tablet, Oral, Daily, 8 6 mg at 08/11/18 0823    Insert peripheral IV, , , Once **AND** sodium chloride (PF) 0 9 % injection 3 mL, 3 mL, Intravenous, PRN    traMADol (ULTRAM) tablet 50 mg, 50 mg, Oral, Q6H PRN, 50 mg at 08/11/18 6157    Allergies  Allergies   Allergen Reactions    Lisinopril Swelling     Social History   reports that he has quit smoking  He has never used smokeless tobacco  He reports that he uses drugs, including Marijuana  He reports that he does not drink alcohol  Family History  History reviewed  No pertinent family history  No known family history of GI malignancy  ROS  ROS: Reports:  Abdominal pain, nausea  Denies shortness of breath, fever  All others negative except as noted in HPI  Objective   Vitals  BP (!) 163/105   Pulse 91   Temp (!) 97 4 °F (36 3 °C) (Temporal)   Resp 18   Ht 5' 6 5" (1 689 m)   Wt 51 kg (112 lb 7 oz)   SpO2 99%   BMI 17 88 kg/m²   General: Alert, no apparent distress  Eyes:  No scleral icterus  ENT:  Mucous membranes moist  Card:  Regular rhythm  Lungs: Clear to ascultation b/l  No wheezes, rales, rhonchi  Abdomen:  Soft  Nondistended  Bowel sounds are present    He is tender in left upper quadrant without rebound or guarding  Extremities:  No edema  Neuro: Alert and oriented x3    Laboratory Studies  Lab Results   Component Value Date    WBC 6 10 08/11/2018    HGB 10 5 (L) 08/11/2018    HCT 30 5 (L) 08/11/2018     08/11/2018    MCV 96 08/11/2018     Lab Results   Component Value Date    HGB 10 5 (L) 08/11/2018    HGB 12 3 (L) 08/10/2018    HGB 13 3 07/28/2018       Lab Results   Component Value Date    CREATININE 0 59 (L) 08/11/2018    BUN 10 08/11/2018     08/11/2018    K 3 4 (L) 08/11/2018     08/11/2018    CO2 29 08/11/2018    GLUCOSE 104 (H) 08/11/2018    CALCIUM 8 4 08/11/2018    PROT 6 8 08/11/2018    ALKPHOS 81 08/11/2018    BILITOT 0 60 08/11/2018    AST 39 08/11/2018    ALT 42 08/11/2018     Lab Results Component Value Date    PROTIME 9 3 (L) 08/11/2018    INR 0 87 (L) 08/11/2018       Assessment and Plan:  1  Chemical pancreatitis with normal pancreas on recent CT scan  Uncertain etiology  Patient denies alcohol  No evidence of biliary duct dilatation on CT 7/28  He is status post cholecystectomy  Currently NPO    2   Diabetes mellitus with an element of noncompliance    Principal Problem:    Generalized weakness  Active Problems:    Hyponatremia    Type 2 diabetes mellitus with hyperglycemia, without long-term current use of insulin (HCC)    Ambulatory dysfunction    Elevated lipase    Accelerated hypertension    UTI (urinary tract infection)    Other hyperlipidemia      Vianey Peres PA-C

## 2018-08-11 NOTE — NURSING NOTE
Patient's VS Blood pressure 155/82, pulse 75, temperature 97 7 °F (36 5 °C), temperature source Temporal, resp  rate 18, height 5' 6 5" (1 689 m), weight 51 kg (112 lb 7 oz), SpO2 95 %  Administered Tramadol for 7/10 pain in left flank, inferior, posterior mediastinal area  Patient is AAOx4, remains pleasant  Call light within reach  Will continue to monitor

## 2018-08-11 NOTE — ASSESSMENT & PLAN NOTE
With nausea vomiting and left upper quadrant pain,  Previous records also documented elevated lipase level on July 28 , 2018  lipase was 746   Tonight lipase 3535  CT of abdomen did not show evidence of acute pancreatitis  Patient has non specific abdominal discomfort  Start patient on liquid diet  Reduce IVF  Continue with PPI  GI evaluation is pending  Fasting lipid panel and particularly triglyceride level within normal levels  Patient has a history of alcohol abuse

## 2018-08-11 NOTE — CASE MANAGEMENT
Initial Clinical Review    Admission: Date/Time/Statement: 8/10/18 @ 2053     Orders Placed This Encounter   Procedures    Inpatient Admission (expected length of stay for this patient is greater than two midnights)     Standing Status:   Standing     Number of Occurrences:   1     Order Specific Question:   Admitting Physician     Answer:   Juan Kelly     Order Specific Question:   Level of Care     Answer:   Med Surg [16]     Order Specific Question:   Estimated length of stay     Answer:   More than 2 Midnights     Order Specific Question:   Certification     Answer:   I certify that inpatient services are medically necessary for this patient for a duration of greater than two midnights  See H&P and MD Progress Notes for additional information about the patient's course of treatment  ED: Date/Time/Mode of Arrival:   ED Arrival Information     Expected Arrival Acuity Means of Arrival Escorted By Service Admission Type    8/10/2018 18:07 8/10/2018 18:06 Urgent [facility] General Medicine Urgent    Arrival Complaint    dizziness           Chief Complaint:   Chief Complaint   Patient presents with    Dizziness    Weakness - Generalized    Fall       History of Illness: 45 y o  male with history of poorly-controlled diabetes, hypertension who presented to the emergency room with multiple complaints including generalized weakness, 2 falls without head injury over the past week due to weakness, poorly control glucose with episodes of nausea and vomitingX 2days  Due to abdominal pain and nausea he reports decreased oral intake  Patient also complained on dull left upper quadrant ,under ribcage nonradiating abdominal pain getting worse with meals  He stopped taking metformin due to diarrhea has not been taking Lantus for unknown reason  Lipase level trended up from 746 on July 28 to 3534 today  LFTs normal Blood glucose 270 on admission ,anion,gap normal, blood ketones 1+    10 units of subcu insulin was given in the ER blood pressure significantly elevated on admission  WBC normal , patient remains afebrile ,though urinalysis positive for nitrate,bacteriuria  Patient admitted on an inpatient basis to the hospital service    ED Vital Signs:   ED Triage Vitals   Temperature Pulse Respirations Blood Pressure SpO2   08/10/18 1812 08/10/18 1812 08/10/18 1812 08/10/18 1812 08/10/18 1812   (!) 97 4 °F (36 3 °C) 100 18 (!) 181/101 100 %      Temp Source Heart Rate Source Patient Position - Orthostatic VS BP Location FiO2 (%)   08/10/18 1812 08/10/18 1812 08/10/18 2200 08/10/18 1812 --   Temporal Monitor Lying Left arm       Pain Score       08/10/18 2228       8        Wt Readings from Last 1 Encounters:   08/10/18 51 kg (112 lb 7 oz)       Vital Signs (abnormal): /101 - 190/116  Exam - mild left upper abdominal tenderness  Mucous membranes dry    Abnormal Labs/Diagnostic Test Results:   UA cloudy  + nitrites  >= 500 protein  1% glucose  150 0 blood  Wbc 6 70, hgb 12 3, hct 36 5  Lipase 3534  Acetone +1  Na 135  Bun 14  Creatinine 0 61  Glucose 276     Blood gas, venous [[id number]] (Abnormal) Collected: 08/10/18 1949   Order Status: Completed Lab Status: Final result Updated: 08/10/18 2002   Specimen: Blood from Arm, Left     pH, Jones 7 360 7 350 - 7 450     pCO2, Jones 54 0 (H) 41 0 - 51 0 mm Hg     pO2, Jones 24 0 (L) >=40 0 mm Hg     HCO3, Jones 30 5 (H) 23 - 28 mmol/L     Base Excess, Jones 4 0 (H) -2 1 - 2 1 mmol/L     O2 Content, Jones 7 9 ml/dL     O2 HGB, VENOUS 49 2 (L) >75 0 %      8/11/2018-  K 3 4  Anion gap 2  Bun 10  Creatinine 0 59  Glucose 104     Wbc 6 10   hgb 10 5, hct 30 5  Lipase 4811    ED Treatment:   Medication Administration from 08/10/2018 1806 to 08/10/2018 2225       Date/Time Order Dose Route Action Comments     08/10/2018 2208 sodium chloride 0 9 % bolus 1,000 mL 0 mL Intravenous Stopped      08/10/2018 1915 sodium chloride 0 9 % bolus 1,000 mL 1,000 mL Intravenous New Bag      08/10/2018 2128 insulin regular (HumuLIN R,NovoLIN R) injection 10 Units 10 Units Subcutaneous Given      08/10/2018 2130 sodium chloride 0 9 % bolus 1,000 mL 1,000 mL Intravenous New Bag      08/10/2018 2208 cefTRIAXone (ROCEPHIN) IVPB (premix) 1,000 mg 0 mg Intravenous Stopped      08/10/2018 2129 cefTRIAXone (ROCEPHIN) IVPB (premix) 1,000 mg 1,000 mg Intravenous New Bag           Past Medical/Surgical History: Active Ambulatory Problems     Diagnosis Date Noted    Diabetic ketoacidosis without coma ([facility] ) 09/05/2016    Hyponatremia 09/05/2016    Acute renal failure ([place] [address] ) 09/05/2016    Hypertension 09/05/2016     Resolved Ambulatory Problems     Diagnosis Date Noted    No Resolved Ambulatory Problems     Past Medical History:   Diagnosis Date    Diabetes mellitus ([place] [address] )     GERD (gastroesophageal reflux disease)     Hyperlipidemia     Hypertension     Migraine     Psychiatric disorder        Admitting Diagnosis: Dizziness [R42]  Pancreatitis [K85 90]  Acetonemia [R79 89]  UTI (urinary tract infection) [N39 0]  Anemia [D64 9]  Hyperglycemia [R73 9]  Generalized weakness [R53 1]  Unspecified multiple injuries, initial encounter [T07  XXXA]    Age/Sex: 45 y o  male    Assessment/Plan:   Generalized weakness   Assessment & Plan     Multifactorial secondary to poorly-controlled diabetes, UTI  Will treat multiple comorbidities and reassess  Fall precautions  PT OT evaluation   for discharge plan       Type 2 diabetes mellitus with hyperglycemia, without long-term current use of insulin ([place] [address] )   Assessment & Plan             Lab Results   Component Value Date     HGBA1C 15 2 (H) 09/06/2016    Poorly control diabetes given significantly elevated hemoglobin A1c since 2015 12 6==>15 2 in 2017, medical noncompliance  Will repeat hemoglobin A1c  Patient is currently NPO continue Accu-Cheks insulin sliding scale q 6 hours  Nutritionist consult  Consider endocrinology consult  Patient stopped taking long-acting insulin, he reports being noncompliant with metformin due to diarrhea   Patient presented with blood glucose 270, normal anion gap    He was given 10 U subcu regular insulin in the emergency room, Will continue to monitor blood glucose        Ambulatory dysfunction   Assessment & Plan     With frequent falls (at least 2 falls over the past week) without head injury most likely secondary to diabetic polyneuropathy in the setting of poorly-controlled diabetes  Fall precaution  PT OT evaluation       Elevated lipase   Assessment & Plan     With nausea vomiting and left upper quadrant pain,  Previous records also documented elevated lipase level on July 28 , 2018  lipase was 746   Tonight lipase 3535  CT of abdomen did not show evidence of acute pancreatitis  Will keep NPO ask for acute pancreatitis  Continue IV fluids Pain Management antiemetic ppi IV until GI evaluation  Will check fasting lipid panel and particularly triglyceride level  Patient has a history of alcohol abuse       Accelerated hypertension   Assessment & Plan     Secondary to medical noncompliance  Continue to monitor blood pressure   amlodipine 5 mg daily and metoprolol   Hydralazine IV p r n         UTI (urinary tract infection)   Assessment & Plan     Patient afebrile WBC normal was started on ceftriaxone deescalate antibiotic based on final culture report       Other hyperlipidemia   Assessment & Plan     On atorvastatin 40 mg       Hyponatremia   Assessment & Plan     Secondary to hyperglycemia, corrected sodium 139  Continue IV fluids, repeat BMP in a m          Admission Orders:  8/10/2018  2054 INPATIENT   Scheduled Meds:   Current Facility-Administered Medications:  acetaminophen 650 mg Oral Q6H PRN    amLODIPine 5 mg Oral Daily    atorvastatin 40 mg Oral Daily With Dinner    citalopram 20 mg Oral Daily    docusate sodium 100 mg Oral BID    enoxaparin 40 mg Subcutaneous Daily    gabapentin 100 mg Oral BID    hydrALAZINE 5 mg Intravenous Q6H PRN    insulin lispro 1-5 Units Subcutaneous Q6H [address]    metoprolol tartrate 50 mg Oral Q12H SHERRY    mirtazapine 15 mg Oral HS    ondansetron 4 mg Intravenous Once    ondansetron 4 mg Intravenous Q4H PRN    pantoprazole 40 mg Intravenous Q24H SHERRY    polyethylene glycol 17 g Oral Daily PRN    senna 1 tablet Oral Daily    sodium chloride (PF) 3 mL Intravenous PRN    sodium chloride 125 mL/hr Intravenous Continuous Last Rate: 125 mL/hr (08/11/18 0441)   traMADol 50 mg Oral Q6H PRN      Continuous Infusions:   sodium chloride 125 mL/hr Last Rate: 125 mL/hr (08/11/18 0441)     PRN Meds:   Acetaminophen - used x 1      traMADol - used x 1       Fingerstick glucose q 6h  scds  NPO  Consult GI  PT/OT

## 2018-08-11 NOTE — ASSESSMENT & PLAN NOTE
Blood pressure at the time of admission was uncontrolled  Blood pressure is still on controlled but better reading  Patient is started on metoprolol and amlodipine  Continue to monitor and titrate blood pressure medications as needed

## 2018-08-11 NOTE — SOCIAL WORK
Pt admitted with weakness 2' uncontrolled DM and pancreatitis- started on CLD this day with IVF's continued  Pt with flat affect and depressed mood with pt using [facility] for mental health states has a  through their service but unsure of name (will need to follow up Monday with same)- Dr Yuridia Wiggins ordered psych eval as requested- pt is agreeable to [address] admission if it is recommended  Per Pt he is treated for anxiety taking Klonopin (not on same in hospital) and currently on Remeron and Celexa in house  Pt admits to noncompliance due to lack of self motivation to care for self despite knowledge of what needs to be done-feels he requires close support to make decisions/guidance for him however admits to not liking being independent  Pt currently lives in an apartment alone with prior notes showing a community service exempt form was completed and sent to the [employer]- when questioned pt re: same was unable to give more specifics only stating he received a letter this past week but unsure what it was about  Pt's PCP is Dr Dennis at the [facility] with [address] following pt (will follow up Monday with inquiry of services provided)  Attempted to call marcy Moses to update on pt status as requested however VM is full- pt notified of same  Will continue to follow throughout hospitalization for d/c needs and follow up Monday with outpt providers as stated above

## 2018-08-11 NOTE — PROGRESS NOTES
Progress Note - Kejni Garza 1965, 45 y o  male MRN: [id number]    Unit/Bed#: 7T [facility] 704-02 Encounter: [id number]    Primary Care Provider: Elmer Chapin DO   Date and time admitted to hospital: 8/10/2018  6:06 PM        Other hyperlipidemia   Assessment & Plan    On atorvastatin 40 mg        UTI (urinary tract infection)   Assessment & Plan    Patient urine analysis is negative for infection  Will DC IV antibiotics          Accelerated hypertension   Assessment & Plan    Blood pressure at the time of admission was uncontrolled  Blood pressure is still on controlled but better reading  Patient is started on metoprolol and amlodipine  Continue to monitor and titrate blood pressure medications as needed           Elevated lipase   Assessment & Plan    With nausea vomiting and left upper quadrant pain,  Previous records also documented elevated lipase level on July 28 , 2018  lipase was 746   Tonight lipase 3535  CT of abdomen did not show evidence of acute pancreatitis  Patient has non specific abdominal discomfort  Start patient on liquid diet  Reduce IVF  Continue with PPI  GI evaluation is pending  Fasting lipid panel and particularly triglyceride level within normal levels  Patient has a history of alcohol abuse        Ambulatory dysfunction   Assessment & Plan    With frequent falls (at least 2 falls over the past week) without head injury most likely secondary to diabetic polyneuropathy in the setting of poorly-controlled diabetes  Fall precaution  PT OT evaluation          Type 2 diabetes mellitus with hyperglycemia, without long-term current use of insulin ([address] )   Assessment & Plan    Lab Results   Component Value Date    HGBA1C 15 2 (H) 09/06/2016   Poorly control diabetes given significantly elevated hemoglobin A1c since 2015 12 6==>15 2 in 2017, medical noncompliance  Patient is NPO at the time of admission  Patient stopped taking long-acting insulin, he reports being noncompliant with metformin due to diarrhea   Patient presented with blood glucose 270, normal anion gap  He was given 10 U subcu regular insulin in the emergency room, Will continue to monitor blood glucose     Plan: Will start patient on Liquid diet  CBG is better controlled today  HA1C is pending  Continue with ISS, accalicia      (P) 101        Hyponatremia   Assessment & Plan    Secondary to hyperglycemia, corrected sodium 139  Reduce IV fluids  * Generalized weakness   Assessment & Plan    Multifactorial secondary to poorly-controlled diabetes, UTI  Will treat multiple comorbidities and reassess  Patient lives alone  Dosenot take his medications when he feels tired  Concern of Complaince  Fall precautions  PT/OT evaluation   for discharge plan            VTE Pharmacologic Prophylaxis:   Pharmacologic: Enoxaparin (Lovenox)  Mechanical VTE Prophylaxis in Place: Yes    Patient Centered Rounds: I have performed bedside rounds with nursing staff today  Education and Discussions with Family / Patient:  Patient    Time Spent for Care: 30 minutes  More than 50% of total time spent on counseling and coordination of care as described above  Current Length of Stay: 1 day(s)    Current Patient Status: Inpatient   Certification Statement: The patient will continue to require additional inpatient hospital stay due to Reccurent falls    Discharge Plan: Pending    Code Status: Level 1 - Full Code      Subjective:   Patient feels better today  Discussed with the patient about the plan of care  Patient lives alone  Does not take his medications when he does not feel well  Concern of noncompliance  Objective:     Vitals:   Temp (24hrs), Av 6 °F (36 4 °C), Min:97 4 °F (36 3 °C), Max:97 7 °F (36 5 °C)    HR:  [] 91  Resp:  [18-20] 18  BP: (155-190)/() 163/105  SpO2:  [95 %-100 %] 99 %  Body mass index is 17 88 kg/m²  Input and Output Summary (last 24 hours):        Intake/Output Summary (Last 24 hours) at 08/11/18 0949  Last data filed at 08/11/18 2309   Gross per 24 hour   Intake             1062 ml   Output              800 ml   Net              262 ml       Physical Exam:     Physical Exam   Constitutional: He is oriented to person, place, and time  No distress  underweight   HENT:   Head: Normocephalic and atraumatic  Eyes: Pupils are equal, round, and reactive to light  Right eye exhibits no discharge  Left eye exhibits no discharge  Neck: Normal range of motion  No JVD present  Cardiovascular: Normal rate and regular rhythm  No murmur heard  Pulmonary/Chest: Effort normal and breath sounds normal    Abdominal: Soft  Bowel sounds are normal  He exhibits no distension  There is no tenderness  Musculoskeletal: Normal range of motion  He exhibits no edema or deformity  Neurological: He is alert and oriented to person, place, and time  Generalized weakness   Skin: Skin is warm and dry  He is not diaphoretic  Psychiatric: He has a normal mood and affect  His behavior is normal          Additional Data:     Labs:      Results from last 7 days  Lab Units 08/11/18  0514   WBC Thousand/uL 6 10   HEMOGLOBIN g/dL 10 5*   HEMATOCRIT % 30 5*   PLATELETS Thousands/uL 168   NEUTROS PCT % 74*   LYMPHS PCT % 18*   MONOS PCT % 7   EOS PCT % 1       Results from last 7 days  Lab Units 08/11/18  0514   SODIUM mmol/L 137   POTASSIUM mmol/L 3 4*   CHLORIDE mmol/L 106   CO2 mmol/L 29   BUN mg/dL 10   CREATININE mg/dL 0 59*   CALCIUM mg/dL 8 4   TOTAL PROTEIN g/dL 6 8   BILIRUBIN TOTAL mg/dL 0 60   ALK PHOS U/L 81   ALT U/L 42   AST U/L 39   GLUCOSE RANDOM mg/dL 104*       Results from last 7 days  Lab Units 08/11/18  0016   INR  0 87*       Results from last 7 days  Lab Units 08/11/18  0621 08/11/18  0104 08/10/18  2354   POC GLUCOSE mg/dl 116* 113* 74             * I Have Reviewed All Lab Data Listed Above  * Additional Pertinent Lab Tests Reviewed:  [facility] Admission Reviewed    Imaging:    Imaging Reports Reviewed Today Include:  CT abdomen      Recent Cultures (last 7 days):           Last 24 Hours Medication List:     Current Facility-Administered Medications:  acetaminophen 650 mg Oral Q6H PRN Harish Silverio MD    amLODIPine 5 mg Oral Daily Harish Silverio MD    atorvastatin 40 mg Oral Daily With Ana Sue MD    citalopram 20 mg Oral Daily Harish Silverio MD    docusate sodium 100 mg Oral BID Harish Silverio MD    enoxaparin 40 mg Subcutaneous Daily Harish Silverio MD    gabapentin 100 mg Oral BID Harish Silverio MD    hydrALAZINE 5 mg Intravenous Q6H PRN Harish Silverio MD    insulin lispro 1-5 Units Subcutaneous Q6H [facility] Harish Silverio MD    metoprolol tartrate 50 mg Oral Q12H [facility] Harish Silverio MD    mirtazapine 15 mg Oral HS Harish Silverio MD    ondansetron 4 mg Intravenous Once Armaan Diaz MD    ondansetron 4 mg Intravenous Q4H PRN Harish Silverio MD    pantoprazole 40 mg Intravenous Q24H [facility] Harish Silverio MD    polyethylene glycol 17 g Oral Daily PRN Harish Silverio MD    senna 1 tablet Oral Daily Harish Silverio MD    sodium chloride (PF) 3 mL Intravenous PRN Armaan Diaz MD    sodium chloride 125 mL/hr Intravenous Continuous Harish Silverio MD Last Rate: 125 mL/hr (08/11/18 0441)   traMADol 50 mg Oral Q6H PRN Harish Silverio MD         Today, Patient Was Seen By: Manuel Puri MD    ** Please Note: Dictation voice to text software may have been used in the creation of this document   **

## 2018-08-11 NOTE — ASSESSMENT & PLAN NOTE
With nausea vomiting and left upper quadrant pain,  Previous records also documented elevated lipase level on July 28 , 2018  lipase was 746   Tonight lipase 3535  CT of abdomen did not show evidence of acute pancreatitis  Will keep NPO ask for acute pancreatitis  Continue IV fluids Pain Management antiemetic ppi IV until GI evaluation  Will check fasting lipid panel and particularly triglyceride level  Patient has a history of alcohol abuse

## 2018-08-11 NOTE — NURSING NOTE
Patient is A+Ox3, flat affect, /105 doctor made aware  Denies pain  Patient states he feels dizzy at times and did not want to walk to BR, utilized urinal instead in AM   IVF infusing as ordered and rate decreased  Patient advanced to clear liquid diet, denies N/V  Patient is standby assist for history of falls and c/o dizziness  Call bell nearby and use encouraged  Will continue to monitor closely

## 2018-08-11 NOTE — ASSESSMENT & PLAN NOTE
Multifactorial secondary to poorly-controlled diabetes, UTI  Will treat multiple comorbidities and reassess  Fall precautions  PT OT evaluation   for discharge plan

## 2018-08-11 NOTE — H&P
H&P- Mercedes Powell 1965, 45 y o  male MRN: [id number]    Unit/Bed#: 7T [facility] 704-02 Encounter: [id number]    Primary Care Provider: Lexie Salazar DO   Date and time admitted to hospital: 8/10/2018  6:06 PM        * Generalized weakness   Assessment & Plan    Multifactorial secondary to poorly-controlled diabetes, UTI  Will treat multiple comorbidities and reassess  Fall precautions  PT OT evaluation   for discharge plan        Type 2 diabetes mellitus with hyperglycemia, without long-term current use of insulin ([facility] [address] )   Assessment & Plan    Lab Results   Component Value Date    HGBA1C 15 2 (H) 09/06/2016    Poorly control diabetes given significantly elevated hemoglobin A1c since 2015 12 6==>15 2 in 2017, medical noncompliance  Will repeat hemoglobin A1c  Patient is currently NPO continue Accu-Cheks insulin sliding scale q 6 hours  Nutritionist consult  Consider endocrinology consult  Patient stopped taking long-acting insulin, he reports being noncompliant with metformin due to diarrhea   Patient presented with blood glucose 270, normal anion gap    He was given 10 U subcu regular insulin in the emergency room, Will continue to monitor blood glucose               Ambulatory dysfunction   Assessment & Plan    With frequent falls (at least 2 falls over the past week) without head injury most likely secondary to diabetic polyneuropathy in the setting of poorly-controlled diabetes  Fall precaution  PT OT evaluation        Elevated lipase   Assessment & Plan    With nausea vomiting and left upper quadrant pain,  Previous records also documented elevated lipase level on July 28 , 2018  lipase was 746   Tonight lipase 3535  CT of abdomen did not show evidence of acute pancreatitis  Will keep NPO ask for acute pancreatitis  Continue IV fluids Pain Management antiemetic ppi IV until GI evaluation  Will check fasting lipid panel and particularly triglyceride level  Patient has a history of alcohol abuse        Accelerated hypertension   Assessment & Plan    Secondary to medical noncompliance  Continue to monitor blood pressure   amlodipine 5 mg daily and metoprolol   Hydralazine IV p r n          UTI (urinary tract infection)   Assessment & Plan    Patient afebrile WBC normal was started on ceftriaxone deescalate antibiotic based on final culture report        Other hyperlipidemia   Assessment & Plan    On atorvastatin 40 mg        Hyponatremia   Assessment & Plan    Secondary to hyperglycemia, corrected sodium 139  Continue IV fluids, repeat BMP in a m  VTE Prophylaxis: Enoxaparin (Lovenox)  / sequential compression device   Code Status: full  POLST: There is no POLST form on file for this patient (pre-hospital)      Anticipated Length of Stay:  Patient will be admitted on an Inpatient basis with an anticipated length of stay of  > 2 midnights  Justification for Hospital Stay:  GI consult    Total Time for Visit, including Counseling / Coordination of Care: 45 minutes  Greater than 50% of this total time spent on direct patient counseling and coordination of care  Chief Complaint:  Left-sided t abdominal pain, generalized weakness ,ambulatory dysfunction, falls,n/v    History of Present Illness:    Oliverio Robles is a 45 y o  male with history of poorly-controlled diabetes, hypertension who presented to the emergency room with multiple complaints including generalized weakness, 2 falls without head injury over the past week due to weakness, poorly control glucose with episodes of nausea and vomitingX 2days  Due to abdominal pain and nausea he reports decreased oral intake  Patient also complained on dull left upper quadrant ,under ribcage nonradiating abdominal pain getting worse with meals  He stopped taking metformin due to diarrhea has not been taking Lantus for unknown reason  Upon my assessment he denies fever chills cough sputum production dysuria significant weight loss     Lipase level trended up from 746 on July 28 to 3534 today  LFTs normal Blood glucose 270 on admission ,anion,gap normal, blood ketones 1+   10 units of subcu insulin was given in the ER blood pressure significantly elevated on admission  WBC normal , patient remains afebrile ,though urinalysis positive for nitrate,bacteriuria  Patient admitted on an inpatient basis to the hospital service    Review of Systems:    Review of Systems   Constitutional: Positive for activity change  Gastrointestinal: Positive for abdominal pain, nausea and vomiting  Musculoskeletal: Positive for gait problem  Past Medical and Surgical History:     Past Medical History:   Diagnosis Date    Diabetes mellitus ([address] )     GERD (gastroesophageal reflux disease)     Hyperlipidemia     Hypertension     Migraine     Psychiatric disorder        Past Surgical History:   Procedure Laterality Date    ESOPHAGOGASTRODUODENOSCOPY N/A 9/7/2016    Procedure: ESOPHAGOGASTRODUODENOSCOPY (EGD); Surgeon: Yaima Callahan MD;  Location: [facility]; Service:        Meds/Allergies:    Prior to Admission medications    Medication Sig Start Date End Date Taking? Authorizing Provider   amLODIPine (NORVASC) 5 mg tablet TAKE 1 TABLET (10 MG TOTAL) BY MOUTH DAILY  4/13/16   Historical Provider, MD   atorvastatin (LIPITOR) 40 mg tablet TAKE 1 TABLET (40 MG TOTAL) BY MOUTH NIGHTLY   TO BETTER CONTROL YOUR CHOLESTEROL 3/30/16   Historical Provider, MD   citalopram (CELEXA) 20 mg tablet Take 20 mg by mouth daily   7/5/13   Historical Provider, MD   famotidine (PEPCID) 20 mg tablet Take 20 mg by mouth daily    Historical Provider, MD   metFORMIN (GLUCOPHAGE) 500 mg tablet Take 1,000 mg by mouth 2 (two) times a day with meals    Historical Provider, MD   metoprolol tartrate (LOPRESSOR) 50 mg tablet TAKE 1 TABLET (50 MG TOTAL) BID 3/21/16   Historical Provider, MD   mirtazapine (REMERON) 15 mg tablet Take 15 mg by mouth daily at bedtime    Historical Provider, MD pantoprazole (PROTONIX) 40 mg tablet Take 40 mg by mouth 2 (two) times a day    Historical Provider, MD   metoclopramide (REGLAN) 10 mg tablet Take 1 tablet (10 mg total) by mouth every 6 (six) hours 7/28/18 8/10/18  Lyla Strauss DO   naproxen (NAPROSYN) 500 mg tablet Take 1 tablet (500 mg total) by mouth 2 (two) times a day with meals 7/28/18 8/10/18  Lyla Strauss DO     I have reviewed home medications with a medical source (PCP, Pharmacy, other)  Allergies: Allergies   Allergen Reactions    Lisinopril Swelling       Social History:     Marital Status: Single   Occupation: none  Patient Pre-hospital Living Situation: home  Patient Pre-hospital Level of Mobility: reg  Patient Pre-hospital Diet Restrictions: reg  Substance Use History:   History   Alcohol Use No     History   Smoking Status    Former Smoker   Smokeless Tobacco    Never Used     History   Drug Use    Types: Marijuana       Family History:    non-contributory    Physical Exam:     Vitals:   Blood Pressure: (!) 190/106 (08/10/18 2228)  Pulse: 86 (08/10/18 2228)  Temperature: 97 7 °F (36 5 °C) (08/10/18 2228)  Temp Source: Temporal (08/10/18 2228)  Respirations: 18 (08/10/18 2228)  Height: 5' 6 5" (168 9 cm) (08/10/18 2228)  Weight - Scale: 51 kg (112 lb 7 oz) (08/10/18 2228)  SpO2: 100 % (08/10/18 2228)    Physical Exam   Constitutional: No distress  HENT:   Head: Normocephalic  Eyes: Pupils are equal, round, and reactive to light  Neck: Normal range of motion  Cardiovascular: Regular rhythm  Pulmonary/Chest: No respiratory distress  Abdominal: He exhibits no distension  There is tenderness  Musculoskeletal: He exhibits no edema  Neurological: He is alert  Skin: Skin is warm  Psychiatric: He has a normal mood and affect  Additional Data:     Lab Results: I have personally reviewed pertinent reports          Results from last 7 days  Lab Units 08/10/18  1913   WBC Thousand/uL 6 70   HEMOGLOBIN g/dL 12 3* HEMATOCRIT % 36 5*   PLATELETS Thousands/uL 201       Results from last 7 days  Lab Units 08/10/18  1913   SODIUM mmol/L 135*   POTASSIUM mmol/L 3 9   CHLORIDE mmol/L 100   CO2 mmol/L 28   BUN mg/dL 14   CREATININE mg/dL 0 61*   CALCIUM mg/dL 9 5   TOTAL PROTEIN g/dL 8 0   BILIRUBIN TOTAL mg/dL 1 00   ALK PHOS U/L 110   ALT U/L 51   AST U/L 46   GLUCOSE RANDOM mg/dL 276*           Results from last 7 days  Lab Units 08/10/18  2354   POC GLUCOSE mg/dl 74           Imaging: I have personally reviewed pertinent reports  No orders to display       EKG, Pathology, and Other Studies Reviewed on Admission:   · EKG: NSR    Allscripts / Epic Records Reviewed: Yes     ** Please Note: This note has been constructed using a voice recognition system   **

## 2018-08-11 NOTE — ASSESSMENT & PLAN NOTE
Lab Results   Component Value Date    HGBA1C 15 2 (H) 09/06/2016    Poorly control diabetes given significantly elevated hemoglobin A1c since 2015 12 6==>15 2 in 2017, medical noncompliance  Will repeat hemoglobin A1c  Patient is currently NPO continue Accu-Cheks insulin sliding scale q 6 hours  Nutritionist consult  Consider endocrinology consult  Patient stopped taking long-acting insulin, he reports being noncompliant with metformin due to diarrhea   Patient presented with blood glucose 270, normal anion gap    He was given 10 U subcu regular insulin in the emergency room, Will continue to monitor blood glucose

## 2018-08-11 NOTE — NURSING NOTE
Patient slept most of the day, encouraged to get OOB by RN, pt stated, " I need to rest today and all day tomorrow to get better "  Patient is tolerating clear liquid diet  Patient just awoke and took evening meds and is eating dinner now  Steady gait to BR with standby supervision  IVFs infusing as ordered  No changes from AM assessment  Pt is sitting comfortably in bed, call bell in reach  Will continue to monitor closely

## 2018-08-11 NOTE — ASSESSMENT & PLAN NOTE
Multifactorial secondary to poorly-controlled diabetes, UTI  Will treat multiple comorbidities and reassess  Patient lives alone  Dosenot take his medications when he feels tired  Concern of Complaince  Fall precautions  PT/OT evaluation   for discharge plan

## 2018-08-11 NOTE — ASSESSMENT & PLAN NOTE
Patient afebrile WBC normal was started on ceftriaxone deescalate antibiotic based on final culture report

## 2018-08-11 NOTE — NURSING NOTE
Patient's VS: Blood pressure 157/88, pulse 86, temperature 97 7 °F (36 5 °C), temperature source Temporal, resp  rate 18, height 5' 6 5" (1 689 m), weight 51 kg (112 lb 7 oz), SpO2 100 %  BP upon arrival to floor: 190/106  Admin 25 mg metoprolol per doctor's order  Rechecked BP; 157/88  Complains of 8/10 pain; left inferior and posterior mediastinal area and mid-back  Administered 650 mg Tylenol  Patient remains pleasant; currently sleeping  Call light within reach  Will continue to monitor

## 2018-08-11 NOTE — ASSESSMENT & PLAN NOTE
With frequent falls (at least 2 falls over the past week) without head injury most likely secondary to diabetic polyneuropathy in the setting of poorly-controlled diabetes  Fall precaution  PT OT evaluation

## 2018-08-11 NOTE — ASSESSMENT & PLAN NOTE
Lab Results   Component Value Date    HGBA1C 15 2 (H) 09/06/2016   Poorly control diabetes given significantly elevated hemoglobin A1c since 2015 12 6==>15 2 in 2017, medical noncompliance  Patient is NPO at the time of admission  Patient stopped taking long-acting insulin, he reports being noncompliant with metformin due to diarrhea   Patient presented with blood glucose 270, normal anion gap  He was given 10 U subcu regular insulin in the emergency room, Will continue to monitor blood glucose     Plan:    Will start patient on Liquid diet  CBG is better controlled today  HA1C is pending  Continue with ISS, accuchecks      (P) 101

## 2018-08-12 PROBLEM — K85.90 ACUTE PANCREATITIS: Status: ACTIVE | Noted: 2018-08-10

## 2018-08-12 LAB
ANION GAP SERPL CALCULATED.3IONS-SCNC: 5 MMOL/L (ref 5–14)
ATRIAL RATE: 87 BPM
BUN SERPL-MCNC: 6 MG/DL (ref 5–25)
CALCIUM SERPL-MCNC: 8.3 MG/DL (ref 8.4–10.2)
CHLORIDE SERPL-SCNC: 104 MMOL/L (ref 97–108)
CO2 SERPL-SCNC: 27 MMOL/L (ref 22–30)
CREAT SERPL-MCNC: 0.56 MG/DL (ref 0.7–1.5)
GFR SERPL CREATININE-BSD FRML MDRD: 136 ML/MIN/1.73SQ M
GLUCOSE SERPL-MCNC: 119 MG/DL (ref 70–99)
GLUCOSE SERPL-MCNC: 130 MG/DL (ref 70–99)
GLUCOSE SERPL-MCNC: 139 MG/DL (ref 70–99)
GLUCOSE SERPL-MCNC: 179 MG/DL (ref 70–99)
GLUCOSE SERPL-MCNC: 220 MG/DL (ref 70–99)
GLUCOSE SERPL-MCNC: 308 MG/DL (ref 70–99)
P AXIS: 84 DEGREES
POTASSIUM SERPL-SCNC: 3.4 MMOL/L (ref 3.6–5)
PR INTERVAL: 176 MS
QRS AXIS: 8 DEGREES
QRSD INTERVAL: 90 MS
QT INTERVAL: 350 MS
QTC INTERVAL: 421 MS
SODIUM SERPL-SCNC: 136 MMOL/L (ref 137–147)
T WAVE AXIS: 68 DEGREES
VENTRICULAR RATE: 87 BPM

## 2018-08-12 PROCEDURE — 80048 BASIC METABOLIC PNL TOTAL CA: CPT | Performed by: HOSPITALIST

## 2018-08-12 PROCEDURE — 82948 REAGENT STRIP/BLOOD GLUCOSE: CPT

## 2018-08-12 PROCEDURE — 93010 ELECTROCARDIOGRAM REPORT: CPT | Performed by: INTERNAL MEDICINE

## 2018-08-12 PROCEDURE — C9113 INJ PANTOPRAZOLE SODIUM, VIA: HCPCS | Performed by: HOSPITALIST

## 2018-08-12 PROCEDURE — 99232 SBSQ HOSP IP/OBS MODERATE 35: CPT | Performed by: HOSPITALIST

## 2018-08-12 RX ORDER — AMLODIPINE BESYLATE 10 MG/1
10 TABLET ORAL DAILY
Status: DISCONTINUED | OUTPATIENT
Start: 2018-08-13 | End: 2018-08-16 | Stop reason: HOSPADM

## 2018-08-12 RX ORDER — POTASSIUM CHLORIDE 750 MG/1
20 TABLET, EXTENDED RELEASE ORAL ONCE
Status: COMPLETED | OUTPATIENT
Start: 2018-08-12 | End: 2018-08-12

## 2018-08-12 RX ORDER — AMLODIPINE BESYLATE 5 MG/1
5 TABLET ORAL ONCE
Status: COMPLETED | OUTPATIENT
Start: 2018-08-12 | End: 2018-08-12

## 2018-08-12 RX ORDER — SODIUM CHLORIDE 9 MG/ML
50 INJECTION, SOLUTION INTRAVENOUS CONTINUOUS
Status: DISCONTINUED | OUTPATIENT
Start: 2018-08-12 | End: 2018-08-14

## 2018-08-12 RX ADMIN — AMLODIPINE BESYLATE 5 MG: 5 TABLET ORAL at 09:09

## 2018-08-12 RX ADMIN — INSULIN LISPRO 1 UNITS: 100 INJECTION, SOLUTION INTRAVENOUS; SUBCUTANEOUS at 23:56

## 2018-08-12 RX ADMIN — DOCUSATE SODIUM 100 MG: 100 CAPSULE, LIQUID FILLED ORAL at 09:09

## 2018-08-12 RX ADMIN — INSULIN LISPRO 1 UNITS: 100 INJECTION, SOLUTION INTRAVENOUS; SUBCUTANEOUS at 00:12

## 2018-08-12 RX ADMIN — METOPROLOL TARTRATE 50 MG: 50 TABLET ORAL at 09:09

## 2018-08-12 RX ADMIN — CITALOPRAM HYDROBROMIDE 20 MG: 20 TABLET ORAL at 09:09

## 2018-08-12 RX ADMIN — MAGNESIUM OXIDE TAB 400 MG (241.3 MG ELEMENTAL MG) 400 MG: 400 (241.3 MG) TAB at 09:09

## 2018-08-12 RX ADMIN — SENNOSIDES 8.6 MG: 8.6 TABLET, FILM COATED ORAL at 09:09

## 2018-08-12 RX ADMIN — SODIUM CHLORIDE 100 ML/HR: 9 INJECTION, SOLUTION INTRAVENOUS at 11:58

## 2018-08-12 RX ADMIN — TRAMADOL HYDROCHLORIDE 50 MG: 50 TABLET, COATED ORAL at 09:13

## 2018-08-12 RX ADMIN — DOCUSATE SODIUM 100 MG: 100 CAPSULE, LIQUID FILLED ORAL at 17:04

## 2018-08-12 RX ADMIN — AMLODIPINE BESYLATE 5 MG: 5 TABLET ORAL at 12:01

## 2018-08-12 RX ADMIN — INSULIN LISPRO 1 UNITS: 100 INJECTION, SOLUTION INTRAVENOUS; SUBCUTANEOUS at 12:01

## 2018-08-12 RX ADMIN — ENOXAPARIN SODIUM 40 MG: 40 INJECTION SUBCUTANEOUS at 09:08

## 2018-08-12 RX ADMIN — MIRTAZAPINE 15 MG: 15 TABLET, FILM COATED ORAL at 21:27

## 2018-08-12 RX ADMIN — MAGNESIUM OXIDE TAB 400 MG (241.3 MG ELEMENTAL MG) 400 MG: 400 (241.3 MG) TAB at 17:04

## 2018-08-12 RX ADMIN — POTASSIUM CHLORIDE 20 MEQ: 10 TABLET, EXTENDED RELEASE ORAL at 12:01

## 2018-08-12 RX ADMIN — PANTOPRAZOLE SODIUM 40 MG: 40 INJECTION, POWDER, FOR SOLUTION INTRAVENOUS at 09:10

## 2018-08-12 RX ADMIN — METOPROLOL TARTRATE 50 MG: 50 TABLET ORAL at 21:27

## 2018-08-12 RX ADMIN — GABAPENTIN 100 MG: 100 CAPSULE ORAL at 09:09

## 2018-08-12 RX ADMIN — SODIUM CHLORIDE 75 ML/HR: 9 INJECTION, SOLUTION INTRAVENOUS at 03:30

## 2018-08-12 RX ADMIN — GABAPENTIN 100 MG: 100 CAPSULE ORAL at 17:04

## 2018-08-12 NOTE — ASSESSMENT & PLAN NOTE
With frequent falls (at least 2 falls over the past week) without head injury most likely secondary to diabetic polyneuropathy in the setting of poorly-controlled diabetes  Fall precaution  PT/OT evaluation   on board

## 2018-08-12 NOTE — ASSESSMENT & PLAN NOTE
Unclear etiology  Patient has pancreatitis maybe related to medication use,viral infection or idiopathic  Continue with IV hydration but decrease to 50 cc/hr  Continue with pain medication if needed  Patient is having loose stools  will send stool culture and stool c diff

## 2018-08-12 NOTE — ASSESSMENT & PLAN NOTE
Secondary to hyperglycemia, corrected sodium 139  Continue with IV hydration   increase that to 100 cc/hour

## 2018-08-12 NOTE — ASSESSMENT & PLAN NOTE
Appreciate GI input  Has a history of alcohol abuse  Patient has mild pancreatitis related to a viral infection  Continue with IV hydration  Continue with IV fluid  Continue with pain medication as needed

## 2018-08-12 NOTE — NURSING NOTE
Patient is A+Ox3, VSS   C/O 8/10 abdominal pain in AM, tramadol given with relief  Pt is tolerating diet, poor appetite, even with encouragement  Pt is expressive, affect less flat, but continues to be withdrawn and disinterested in daily activities  Pt encouraged OOB to chair, pt refused  Patient is presently sleeping comfortably in bed, call bell in reach, SCDs on  IVFs infusing as ordered  Will continue to monitor closely

## 2018-08-12 NOTE — NURSING NOTE
Patient is A+Ox3, VSS, denies pain  Patient spent day in bed, refusing to get up  Patient finally agreed to get washed up and walk around with standby assist   Gait steady today, denies any dizziness  Patient is tolerating clear liquid diet, denies nausea  Improved appetite for dinner, ate lunch late  Patient is tolerating sitting in chair, call bell in reach  Will continue to monitor closely

## 2018-08-12 NOTE — ASSESSMENT & PLAN NOTE
Lab Results   Component Value Date    HGBA1C 12 8 (H) 08/11/2018   Hemoglobin A1c is 12 8  Patient on a full liquid diet  Patient was counseled about compliance  Continue with Accu-Cheks, insulin sliding scale for coverage  Continue with metformin  also add lantus 10 units subcut daily  will need it as unable to use a lot of oral dm medications due to acute pancreatitis bout        (P) [phone]

## 2018-08-12 NOTE — ASSESSMENT & PLAN NOTE
Multifactorial secondary to poorly-controlled diabetes  Will treat multiple comorbidities and reassess  Patient lives alone  Dosenot take his medications when he feels tired  Concern of Complaince  Fall precautions  PT/OT evaluation   for discharge plan

## 2018-08-12 NOTE — NURSING NOTE
Patient received this pm  Alert and oriented x3  Flat affect  Reports minor pain but only wants to sleep  No distress noted will monitor

## 2018-08-12 NOTE — NURSING NOTE
Patient slept most of evening and awoke to an incontinent episode of yellow tan liquid stool  Patient washed up in BR, linens changed  IVFs infusing as ordered  Pt is resting comfortably in bed, call bell in reach  No changes noted from previous assessment  Will cont to monitor closely

## 2018-08-12 NOTE — ASSESSMENT & PLAN NOTE
Lab Results   Component Value Date    HGBA1C 12 8 (H) 08/11/2018   Hemoglobin A1c is 12 8  Patient on a clear liquid diet  CPT is acceptable  Patient was counseled about compliance  Continue with Accu-Cheks, insulin sliding scale for coverage  Continue with metformin      (P) [phone]

## 2018-08-12 NOTE — PROGRESS NOTES
Progress Note - Vernadine Mcburney 1965, 45 y o  male MRN: [id number]    Unit/Bed#: 7T [facility] 704-02 Encounter: [id number]    Primary Care Provider: Adelaide Jacobo DO   Date and time admitted to hospital: 8/10/2018  6:06 PM        Other hyperlipidemia   Assessment & Plan    On atorvastatin 40 mg        Accelerated hypertension   Assessment & Plan    Blood pressure at the time of admission was uncontrolled  Blood pressure is better  Still uncontrolled  Will adjust blood pressure medication  Monitor and titrate as needed          Acute pancreatitis   Assessment & Plan    Appreciate GI input  Has a history of alcohol abuse  Patient has mild pancreatitis related to a viral infection  Continue with IV hydration  Continue with IV fluid  Continue with pain medication as needed          Ambulatory dysfunction   Assessment & Plan    With frequent falls (at least 2 falls over the past week) without head injury most likely secondary to diabetic polyneuropathy in the setting of poorly-controlled diabetes  Fall precaution  PT/OT evaluation   on board        Type 2 diabetes mellitus with hyperglycemia, without long-term current use of insulin ([facility]   Assessment & Plan    Lab Results   Component Value Date    HGBA1C 12 8 (H) 08/11/2018   Hemoglobin A1c is 12 8  Patient on a clear liquid diet  CPT is acceptable  Patient was counseled about compliance  Continue with Accu-Cheks, insulin sliding scale for coverage  Continue with metformin      (P) [phone]        Hyponatremia   Assessment & Plan    Secondary to hyperglycemia, corrected sodium 139  Continue with IV hydration   increase IV fluids to 100 cc/hour        * Generalized weakness   Assessment & Plan    Multifactorial secondary to poorly-controlled diabetes  Will treat multiple comorbidities and reassess  Patient lives alone  Dosenot take his medications when he feels tired  Concern of Complaince  Fall precautions  PT/OT evaluation   for discharge plan            VTE Pharmacologic Prophylaxis:   Pharmacologic: Enoxaparin (Lovenox)  Mechanical VTE Prophylaxis in Place: No    Patient Centered Rounds: I have performed bedside rounds with nursing staff today  Education and Discussions with Family / Patient:  Patient    Time Spent for Care: 30 minutes  More than 50% of total time spent on counseling and coordination of care as described above  Current Length of Stay: 2 day(s)    Current Patient Status: Inpatient   Certification Statement: The patient will continue to require additional inpatient hospital stay due to Generalized weakness, acute pancreatitis    Discharge Plan:     Rehab if qualifies     Code Status: Level 1 - Full Code      Subjective:   Patient still complained of abdominal pain, mainly in the left upper quadrant  Headache to tolerate liquid diet  Patient is interested in rehab     Objective:     Vitals:   Temp (24hrs), Av 5 °F (36 4 °C), Min:96 7 °F (35 9 °C), Max:98 4 °F (36 9 °C)    HR:  [75-94] 80  Resp:  [18] 18  BP: (151-167)/(78-85) 153/78  SpO2:  [100 %] 100 %  Body mass index is 17 88 kg/m²  Input and Output Summary (last 24 hours): Intake/Output Summary (Last 24 hours) at 18 1023  Last data filed at 18 1002   Gross per 24 hour   Intake          1871 25 ml   Output                0 ml   Net          1871 25 ml       Physical Exam:     Physical Exam   Constitutional: He is oriented to person, place, and time  No distress  underweight   HENT:   Head: Normocephalic and atraumatic  Eyes: Pupils are equal, round, and reactive to light  Right eye exhibits no discharge  Left eye exhibits no discharge  Neck: Normal range of motion  No JVD present  Cardiovascular: Normal rate and regular rhythm  No murmur heard  Pulmonary/Chest: Effort normal and breath sounds normal    Abdominal: Soft  Bowel sounds are normal  He exhibits no distension  There is no tenderness     Musculoskeletal: Normal range of motion  He exhibits no edema or deformity  Neurological: He is alert and oriented to person, place, and time  Generalized weakness   Skin: Skin is warm and dry  He is not diaphoretic  Psychiatric: He has a normal mood and affect  His behavior is normal        Additional Data:     Labs:      Results from last 7 days  Lab Units 08/11/18  0514   WBC Thousand/uL 6 10   HEMOGLOBIN g/dL 10 5*   HEMATOCRIT % 30 5*   PLATELETS Thousands/uL 168   NEUTROS PCT % 74*   LYMPHS PCT % 18*   MONOS PCT % 7   EOS PCT % 1       Results from last 7 days  Lab Units 08/12/18  0508 08/11/18  0514   SODIUM mmol/L 136* 137   POTASSIUM mmol/L 3 4* 3 4*   CHLORIDE mmol/L 104 106   CO2 mmol/L 27 29   BUN mg/dL 6 10   CREATININE mg/dL 0 56* 0 59*   CALCIUM mg/dL 8 3* 8 4   TOTAL PROTEIN g/dL  --  6 8   BILIRUBIN TOTAL mg/dL  --  0 60   ALK PHOS U/L  --  81   ALT U/L  --  42   AST U/L  --  39   GLUCOSE RANDOM mg/dL 119* 104*       Results from last 7 days  Lab Units 08/11/18  0016   INR  0 87*       Results from last 7 days  Lab Units 08/12/18  0525 08/11/18  2353 08/11/18  1741 08/11/18  1113 08/11/18  0621 08/11/18  0104 08/10/18  2354   POC GLUCOSE mg/dl 130* 179* 121* 126* 116* 113* 74       Results from last 7 days  Lab Units 08/11/18  0016   HEMOGLOBIN A1C % 12 8*         * I Have Reviewed All Lab Data Listed Above  * Additional Pertinent Lab Tests Reviewed:  All Labs Within Last 24 Hours Reviewed    Imaging:    CT scan of the abdomen report has been reviewed      Recent Cultures (last 7 days):           Last 24 Hours Medication List:     Current Facility-Administered Medications:  acetaminophen 650 mg Oral Q6H PRN Noah Shrestha MD   amLODIPine 5 mg Oral Daily Noah Shrestha MD   atorvastatin 40 mg Oral Daily With Byron Zimmer MD   citalopram 20 mg Oral Daily Noah Shrestha MD   docusate sodium 100 mg Oral BID Noah Shrestha MD   enoxaparin 40 mg Subcutaneous Daily Noah Shrestha MD   gabapentin 100 mg Oral BID Rox Sacks, MD   hydrALAZINE 5 mg Intravenous Q6H PRN Rox Sacks, MD   insulin lispro 1-5 Units Subcutaneous Q6H [facility] Rox Sacks, MD   magnesium oxide 400 mg Oral BID Jhonny Watkins MD   metoprolol tartrate 50 mg Oral Q12H [facility] Rox Sacks, MD   mirtazapine 15 mg Oral HS Rox Sacks, MD   ondansetron 4 mg Intravenous Once Byron Keating MD   ondansetron 4 mg Intravenous Q4H PRN Rox Sacks, MD   pantoprazole 40 mg Intravenous Q24H [facility] Rox Sacks, MD   polyethylene glycol 17 g Oral Daily PRN Rox Sacks, MD   potassium chloride 20 mEq Oral Once Jhonny Watkins MD   senna 1 tablet Oral Daily Rox Sacks, MD   sodium chloride (PF) 3 mL Intravenous PRN Byron Keating MD   traMADol 50 mg Oral Q6H PRN Rox Sacks, MD        Today, Patient Was Seen By: Jhonny Watkins MD    ** Please Note: Dictation voice to text software may have been used in the creation of this document   **

## 2018-08-12 NOTE — CONSULTS
Psychiatric Consultation    Reason for consultation: Depression        History:     Katerina Larry is an 45 y o , male, consulted for depression  Patient had been admitted to the hospital due to GI distress  Patient had been displaying depression a psychiatric consultation was placed  Patient was alert and oriented x3 during the assessment  Patient was a limited historian  Patient was reporting that he could not recall the name of his providers or his treatment  Patient reports that he has been assigned a  which is reported to be through [facility]  Patient reports that he feels depressed due to his ongoing abdominal pain  Patient reports that he is not having any suicidal ideations  Patient denies any history of suicide attempts  Patient denies any homicidal ideations  Patient denies any auditory visual hallucinations  Patient with no overt psychosis during assessment  Patient reports that he had told the doctor that he would go for inpatient treatment because he would like further outpatient supports  Patient reports that he wants someone that can be with him all the time and be able to provide his treatment information  Patient was advised that he had already has a  who would be able to provide such services on an outpatient basis  Patient not indicating that he feels psychiatric admission would be beneficial to help manage his symptoms or psychiatric medications  Patient reports that he had not been taking his psychiatric medication as he feels he needs someone to provide this care to him  Patient reports that when he takes his medications he feels well      Past Medical History:   Diagnosis Date    [facility] mellitus [facility] [address] )     GERD (gastroesophageal reflux disease)     Hyperlipidemia     Hypertension     Migraine     Psychiatric disorder        Past surgical history:  Past Surgical History:   Procedure Laterality Date    ESOPHAGOGASTRODUODENOSCOPY N/A 9/7/2016 Procedure: ESOPHAGOGASTRODUODENOSCOPY (EGD); Surgeon: Evelena Phalen, MD;  Location: [facility]; Service:        Family history:  History reviewed  No pertinent family history      Current medications:    Current Facility-Administered Medications:     acetaminophen (TYLENOL) tablet 650 mg, 650 mg, Oral, Q6H PRN, Lauro Osler, MD, 650 mg at 08/11/18 0006    amLODIPine (NORVASC) tablet 5 mg, 5 mg, Oral, Daily, Lauro Osler, MD, 5 mg at 08/12/18 0909    atorvastatin (LIPITOR) tablet 40 mg, 40 mg, Oral, Daily With Belinda Mackay MD, 40 mg at 08/11/18 1744    citalopram (CeleXA) tablet 20 mg, 20 mg, Oral, Daily, Lauro Osler, MD, 20 mg at 08/12/18 0909    docusate sodium (COLACE) capsule 100 mg, 100 mg, Oral, BID, Lauro Osler, MD, 100 mg at 08/12/18 0909    enoxaparin (LOVENOX) subcutaneous injection 40 mg, 40 mg, Subcutaneous, Daily, Lauro Osler, MD, 40 mg at 08/12/18 0908    gabapentin (NEURONTIN) capsule 100 mg, 100 mg, Oral, BID, Lauro Osler, MD, 100 mg at 08/12/18 0909    hydrALAZINE (APRESOLINE) injection 5 mg, 5 mg, Intravenous, Q6H PRN, Lauro Osler, MD    insulin lispro (HumaLOG) 100 units/mL subcutaneous injection 1-5 Units, 1-5 Units, Subcutaneous, Q6H [address], 1 Units at 08/12/18 0012 **AND** Fingerstick Glucose (POCT), , , Q6H, Lauro Osler, MD    magnesium oxide (MAG-OX) tablet 400 mg, 400 mg, Oral, BID, Kamran Brand MD, 400 mg at 08/12/18 0909    metoprolol tartrate (LOPRESSOR) tablet 50 mg, 50 mg, Oral, Q12H [address], Lauro Osler, MD, 50 mg at 08/12/18 0909    mirtazapine (REMERON) tablet 15 mg, 15 mg, Oral, HS, Lauro Osler, MD, 15 mg at 08/11/18 2201    ondansetron (ZOFRAN) injection 4 mg, 4 mg, Intravenous, Once, Tracee Rogel MD    ondansetron [facility]) injection 4 mg, 4 mg, Intravenous, Q4H PRN, Lauro Osler, MD    pantoprazole (PROTONIX) injection 40 mg, 40 mg, Intravenous, Q24H [address], Lauro Osler, MD, 40 mg at 08/12/18 4030    polyethylene glycol (MIRALAX) packet 17 g, 17 g, Oral, Daily PRN, Rm Rawls MD    senna (SENOKOT) tablet 8 6 mg, 1 tablet, Oral, Daily, Rm Rawls MD, 8 6 mg at 08/12/18 0909    Insert peripheral IV, , , Once **AND** sodium chloride (PF) 0 9 % injection 3 mL, 3 mL, Intravenous, PRN, Lay Herron MD    traMADol Vickye Sanjana) tablet 50 mg, 50 mg, Oral, Q6H PRN, Rm Rawls MD, 50 mg at 08/12/18 0913      Allergies: Allergies   Allergen Reactions    Lisinopril Swelling       Social History:  Social History     Social History    Marital status: Single     Spouse name: N/A    Number of children: N/A    Years of education: N/A     Occupational History    Not on file       Social History Main Topics    Smoking status: Former Smoker    Smokeless tobacco: Never Used    Alcohol use No    Drug use: Yes     Types: Marijuana    Sexual activity: Not on file     Other Topics Concern    Not on file     Social History Narrative    No narrative on file         Physical Examination:     Vital Signs:  Vitals:    08/11/18 2202 08/12/18 0018 08/12/18 0731 08/12/18 0909   BP: 151/79 157/85 153/78 153/78   BP Location:  Left arm Left arm    Pulse: 94 75 80 80   Resp:  18 18    Temp:  98 4 °F (36 9 °C) (!) 97 4 °F (36 3 °C)    TempSrc:  Temporal Temporal    SpO2:  100% 100%    Weight:       Height:             Appearance:  age appropriate and casually dressed   Behavior:  normal   Speech:  normal volume   Mood:  constricted   Affect:  constricted   Thought Process:  normal   Thought Content:  normal   Perceptual Disturbances: None   Risk Potential: none   Sensorium:  person, place and time   Cognition:  intact   Consciousness:  alert and awake    Attention: attention span and concentration were age appropriate   Intellect: average   Insight:  limited   Judgment: limited      Motor Activity: no abnormal movements           Diagnostic Studies:     Recent Labs:  Results Reviewed     Procedure Component Value Units Date/Time    Lipase [[id number]]  (Abnormal) Collected:  08/11/18 0514    Lab Status:  Final result Specimen:  Blood from Arm, Right Updated:  08/11/18 0647     Lipase 4,811 (H) u/L     Triglycerides [[id number]]  (Normal) Collected:  08/10/18 1913    Lab Status:  Final result Specimen:  Blood from Hand, Left Updated:  08/10/18 2131     Triglycerides 145 mg/dL     Urine Microscopic [[id number]]  (Abnormal) Collected:  08/10/18 1958    Lab Status:  Final result Specimen:  Urine from Urine, Clean Catch Updated:  08/10/18 2058     RBC, UA 0-1 (A) /hpf      WBC, UA 1-2 (A) /hpf      Epithelial Cells Occasional /hpf      Bacteria, UA Innumerable (A) /hpf     UA w Reflex to Microscopic w Reflex to Culture [[id number]]  (Abnormal) Collected:  08/10/18 1958    Lab Status:  Final result Specimen:  Urine from Urine, Clean Catch Updated:  08/10/18 2043     Color, UA Straw     Clarity, UA Slightly Cloudy (A)     Specific Gravity, UA 1 015     pH, UA 6 0     Leukocytes, UA Negative     Nitrite, UA Positive (A)     Protein, UA >=500 (A) mg/dl      Glucose, UA >=1000 (1%) (A) mg/dl      Ketones, UA Negative mg/dl      Bilirubin, UA Negative     Blood,  0 (A)     UROBILINOGEN UA Negative mg/dL     CBC [[id number]]  (Abnormal) Collected:  08/10/18 1913    Lab Status:  Final result Specimen:  Blood from Hand, Left Updated:  08/10/18 2017     WBC 6 70 Thousand/uL      RBC 3 78 (L) Million/uL      Hemoglobin 12 3 (L) g/dL      Hematocrit 36 5 (L) %      MCV 97 fL      MCH 32 7 pg      MCHC 33 8 g/dL      RDW 14 7 %      Platelets 714 Thousands/uL      MPV 10 8 fL     Troponin I [[id number]]  (Normal) Collected:  08/10/18 1913    Lab Status:  Final result Specimen:  Blood from Hand, Left Updated:  08/10/18 2016     Troponin I <0 01 ng/mL     Blood gas, venous [[id number]]  (Abnormal) Collected:  08/10/18 1949    Lab Status:  Final result Specimen:  Blood from Arm, Left Updated:  08/10/18 2002     pH, Jones 7 360     pCO2, Jones 54 0 (H) mm Hg      pO2, Jones 24 0 (L) mm Hg      HCO3, Jones 30 5 (H) mmol/L      Base Excess, Jones 4 0 (H) mmol/L      O2 Content, Jones 7 9 ml/dL      O2 HGB, VENOUS 49 2 (L) %     Lipase [[id number]]  (Abnormal) Collected:  08/10/18 1913    Lab Status:  Final result Specimen:  Blood from Hand, Left Updated:  08/10/18 2000     Lipase 3,534 (H) u/L     Acetone [[id number]]  (Abnormal) Collected:  08/10/18 1913    Lab Status:  Final result Specimen:  Blood from Hand, Left Updated:  08/10/18 2000     Acetone, Bld 1+ (A)    Comprehensive metabolic panel [[id number]]  (Abnormal) Collected:  08/10/18 1913    Lab Status:  Final result Specimen:  Blood from Hand, Left Updated:  08/10/18 1954     Sodium 135 (L) mmol/L      Potassium 3 9 mmol/L      Chloride 100 mmol/L      CO2 28 mmol/L      Anion Gap 7 mmol/L      BUN 14 mg/dL      Creatinine 0 61 (L) mg/dL      Glucose 276 (H) mg/dL      Calcium 9 5 mg/dL      AST 46 U/L      ALT 51 U/L      Alkaline Phosphatase 110 U/L      Total Protein 8 0 g/dL      Albumin 4 2 g/dL      Total Bilirubin 1 00 mg/dL      eGFR 131 ml/min/1 73sq m     Narrative:         National Kidney Disease Education Program recommendations are as follows:  GFR calculation is accurate only with a steady state creatinine  Chronic Kidney disease less than 60 ml/min/1 73 sq  meters  Kidney failure less than 15 ml/min/1 73 sq  meters  Lactic acid, plasma [[id number]]  (Normal) Collected:  08/10/18 1913    Lab Status:  Final result Specimen:  Blood from Hand, Left Updated:  08/10/18 1933     LACTIC ACID 1 1 mmol/L     Narrative:         Result may be elevated if tourniquet was used during collection  I/O Past 24 hours:  I/O last 3 completed shifts: In: 2333 3 [P O :720; I V :1613 3]  Out: 800 [Urine:800]  I/O this shift:  In: 600 [P O :600]  Out: -         Impression / Plan:     Dx: MDD    Recommended Treatment:      Medications  1) continue Remeron and Celexa as patient has been re-initiated on    No indication for inpatient psychiatric admission at this time  Patient will [facility] up at [facility] upon discharge  Patient will continue to utilize his outpatient case management  Total floor / unit time spent today 50 minutes  Greater than 50% of total time was spent with the patient and / or family counseling and / or coordination of care  A description of the counseling / coordination of care  Patient's Rights, confidentiality and exceptions to confidentiality, use of automated medical record, [facility] Dimitris Gray staff access to medical record, and consent to treatment reviewed        Lino Azul PA-C

## 2018-08-12 NOTE — PROGRESS NOTES
GI PROGRESS NOTE      Patient Name: Kang Wilkerson  Patient MRN:  [id number]  Date:  08/12/18  Service: Gastroenterology / Hepatology    Assessment / Plan:   1   mild attack of acute pancreatitis of unclear etiology  May be recurrent as patient had another very mild attack about a year ago  Consider hereditary pancreatitis her versus side effect from medications such as atorvastatin  Would recommend stopping atorvastatin and using different medication to treat hypercholesterolemia    Subjective      Kang Wilkerson is a 45 y o  male who was admitted with Acute non biliary pancreatitis  LFTs, calcium level, triglycerides, creatinine are normal   Patient denies use of alcohol  Still having mild epigastric pain  Tolerating some clear liquids  He states he was [facility] another milder attack of acute pancreatitis      ROS:  No fever/ chills/ rash/ arthralgias  No chest pain/ dyspnea/   No dizziness/ headache/   No urinary symptoms/      Objective      Vitals  /80 (BP Location: Right arm)   Pulse 83   Temp (!) 97 1 °F (36 2 °C) (Temporal)   Resp 18   Ht 5' 6 5" (1 689 m)   Wt 51 kg (112 lb 7 oz)   SpO2 99%   BMI 17 88 kg/m²     PhysicalExam  No jaundice / red eyes  No thyromegaly  Trachea midline  Normal breath sounds  No crackles or wheezes  RRR  No gallop or murmur    Abdomen: good BS, soft, non-distended,  Mild epigastric and left upper quadrant tenderness  No cyanosis or edema  Awake, alert, oriented  Neurologic exam: non-focal      Intake/Output Summary (Last 24 hours) at 08/12/18 1614  Last data filed at 08/12/18 1002   Gross per 24 hour   Intake          1871 25 ml   Output                0 ml   Net          1871 25 ml       Laboratory Studies   Lab Results   Component Value Date    CREATININE 0 56 (L) 08/12/2018    BUN 6 08/12/2018    K 3 4 (L) 08/12/2018     08/12/2018    CO2 27 08/12/2018    GLUCOSE 119 (H) 08/12/2018    CALCIUM 8 3 (L) 08/12/2018    PROT 6 8 08/11/2018    ALKPHOS 81 08/11/2018    BILITOT 0 60 08/11/2018    AST 39 08/11/2018    ALT 42 08/11/2018     Lab Results   Component Value Date    WBC 6 10 08/11/2018    HGB 10 5 (L) 08/11/2018    HCT 30 5 (L) 08/11/2018     08/11/2018    MCV 96 08/11/2018     Lab Results   Component Value Date    PROTIME 9 3 (L) 08/11/2018    INR 0 87 (L) 08/11/2018     Lab Results   Component Value Date    HGB 10 5 (L) 08/11/2018    HGB 12 3 (L) 08/10/2018    HGB 13 3 07/28/2018         Medications      Current Facility-Administered Medications:     acetaminophen (TYLENOL) tablet 650 mg, 650 mg, Oral, Q6H PRN, 650 mg at 08/11/18 0006    [START ON 8/13/2018] amLODIPine (NORVASC) tablet 10 mg, 10 mg, Oral, Daily    atorvastatin (LIPITOR) tablet 40 mg, 40 mg, Oral, Daily With Dinner, 40 mg at 08/11/18 1744    citalopram (CeleXA) tablet 20 mg, 20 mg, Oral, Daily, 20 mg at 08/12/18 0909    docusate sodium (COLACE) capsule 100 mg, 100 mg, Oral, BID, 100 mg at 08/12/18 0909    enoxaparin (LOVENOX) subcutaneous injection 40 mg, 40 mg, Subcutaneous, Daily, 40 mg at 08/12/18 0908    gabapentin (NEURONTIN) capsule 100 mg, 100 mg, Oral, BID, 100 mg at 08/12/18 0909    hydrALAZINE (APRESOLINE) injection 5 mg, 5 mg, Intravenous, Q6H PRN    insulin lispro (HumaLOG) 100 units/mL subcutaneous injection 1-5 Units, 1-5 Units, Subcutaneous, Q6H [facility], 1 Units at 08/12/18 1201 **AND** Fingerstick Glucose (POCT), , , Q6H    magnesium oxide (MAG-OX) tablet 400 mg, 400 mg, Oral, BID, 400 mg at 08/12/18 0909    metoprolol tartrate (LOPRESSOR) tablet 50 mg, 50 mg, Oral, Q12H SHERRY, 50 mg at 08/12/18 0909    mirtazapine (REMERON) tablet 15 mg, 15 mg, Oral, HS, 15 mg at 08/11/18 2201    ondansetron (ZOFRAN) injection 4 mg, 4 mg, Intravenous, Once    ondansetron (ZOFRAN) injection 4 mg, 4 mg, Intravenous, Q4H PRN    pantoprazole (PROTONIX) injection 40 mg, 40 mg, Intravenous, Q24H SHERRY, 40 mg at 08/12/18 0910   polyethylene glycol (MIRALAX) packet 17 g, 17 g, Oral, Daily PRN    senna (SENOKOT) tablet 8 6 mg, 1 tablet, Oral, Daily, 8 6 mg at 08/12/18 0909    Insert peripheral IV, , , Once **AND** sodium chloride (PF) 0 9 % injection 3 mL, 3 mL, Intravenous, PRN    sodium chloride 0 9 % infusion, 100 mL/hr, Intravenous, Continuous, 100 mL/hr at 08/12/18 1158    traMADol (ULTRAM) tablet 50 mg, 50 mg, Oral, Q6H PRN, 50 mg at 08/12/18 1685    Past Medical History:   Diagnosis Date    Diabetes mellitus [facility] [address] )     GERD (gastroesophageal reflux disease)     Hyperlipidemia     Hypertension     Migraine     Psychiatric disorder            Total time spent with patient 20 minutes, >50% spent counseling and/or coordination of care         Shaina Molina MD

## 2018-08-12 NOTE — ASSESSMENT & PLAN NOTE
Blood pressure at the time of admission was uncontrolled  Blood pressure is better controlled now with adjusted medications

## 2018-08-12 NOTE — ASSESSMENT & PLAN NOTE
Blood pressure at the time of admission was uncontrolled  Blood pressure is better  Still uncontrolled  Will adjust blood pressure medication  Monitor and titrate as needed

## 2018-08-13 LAB
GLUCOSE SERPL-MCNC: 186 MG/DL (ref 70–99)
GLUCOSE SERPL-MCNC: 205 MG/DL (ref 70–99)
GLUCOSE SERPL-MCNC: 211 MG/DL (ref 70–99)
GLUCOSE SERPL-MCNC: 278 MG/DL (ref 70–99)
GLUCOSE SERPL-MCNC: 460 MG/DL (ref 70–99)

## 2018-08-13 PROCEDURE — G8982 BODY POS GOAL STATUS: HCPCS

## 2018-08-13 PROCEDURE — 99232 SBSQ HOSP IP/OBS MODERATE 35: CPT | Performed by: FAMILY MEDICINE

## 2018-08-13 PROCEDURE — C9113 INJ PANTOPRAZOLE SODIUM, VIA: HCPCS | Performed by: HOSPITALIST

## 2018-08-13 PROCEDURE — G8987 SELF CARE CURRENT STATUS: HCPCS

## 2018-08-13 PROCEDURE — 97530 THERAPEUTIC ACTIVITIES: CPT

## 2018-08-13 PROCEDURE — 97162 PT EVAL MOD COMPLEX 30 MIN: CPT

## 2018-08-13 PROCEDURE — G8988 SELF CARE GOAL STATUS: HCPCS

## 2018-08-13 PROCEDURE — G8981 BODY POS CURRENT STATUS: HCPCS

## 2018-08-13 PROCEDURE — 82948 REAGENT STRIP/BLOOD GLUCOSE: CPT

## 2018-08-13 PROCEDURE — 97167 OT EVAL HIGH COMPLEX 60 MIN: CPT

## 2018-08-13 RX ORDER — POTASSIUM CHLORIDE 14.9 MG/ML
20 INJECTION INTRAVENOUS ONCE
Status: COMPLETED | OUTPATIENT
Start: 2018-08-13 | End: 2018-08-13

## 2018-08-13 RX ORDER — INSULIN GLARGINE 100 [IU]/ML
10 INJECTION, SOLUTION SUBCUTANEOUS EVERY MORNING
Status: DISCONTINUED | OUTPATIENT
Start: 2018-08-13 | End: 2018-08-14

## 2018-08-13 RX ORDER — PANTOPRAZOLE SODIUM 40 MG/1
40 TABLET, DELAYED RELEASE ORAL
Status: DISCONTINUED | OUTPATIENT
Start: 2018-08-13 | End: 2018-08-16 | Stop reason: HOSPADM

## 2018-08-13 RX ADMIN — ENOXAPARIN SODIUM 40 MG: 40 INJECTION SUBCUTANEOUS at 09:11

## 2018-08-13 RX ADMIN — INSULIN LISPRO 2 UNITS: 100 INJECTION, SOLUTION INTRAVENOUS; SUBCUTANEOUS at 18:18

## 2018-08-13 RX ADMIN — GABAPENTIN 100 MG: 100 CAPSULE ORAL at 18:18

## 2018-08-13 RX ADMIN — INSULIN GLARGINE 10 UNITS: 100 INJECTION, SOLUTION SUBCUTANEOUS at 12:55

## 2018-08-13 RX ADMIN — PANTOPRAZOLE SODIUM 40 MG: 40 TABLET, DELAYED RELEASE ORAL at 16:07

## 2018-08-13 RX ADMIN — SODIUM CHLORIDE 50 ML/HR: 9 INJECTION, SOLUTION INTRAVENOUS at 12:02

## 2018-08-13 RX ADMIN — MIRTAZAPINE 15 MG: 15 TABLET, FILM COATED ORAL at 21:04

## 2018-08-13 RX ADMIN — POTASSIUM CHLORIDE 20 MEQ: 200 INJECTION, SOLUTION INTRAVENOUS at 12:52

## 2018-08-13 RX ADMIN — SODIUM CHLORIDE 100 ML/HR: 9 INJECTION, SOLUTION INTRAVENOUS at 01:31

## 2018-08-13 RX ADMIN — INSULIN LISPRO 1 UNITS: 100 INJECTION, SOLUTION INTRAVENOUS; SUBCUTANEOUS at 12:56

## 2018-08-13 RX ADMIN — NYSTATIN AND TRIAMCINOLONE ACETONIDE: 100000; 1 CREAM TOPICAL at 12:55

## 2018-08-13 RX ADMIN — PANTOPRAZOLE SODIUM 40 MG: 40 INJECTION, POWDER, FOR SOLUTION INTRAVENOUS at 09:11

## 2018-08-13 RX ADMIN — METOPROLOL TARTRATE 50 MG: 50 TABLET ORAL at 09:11

## 2018-08-13 RX ADMIN — AMLODIPINE BESYLATE 10 MG: 10 TABLET ORAL at 09:11

## 2018-08-13 RX ADMIN — CITALOPRAM HYDROBROMIDE 20 MG: 20 TABLET ORAL at 09:11

## 2018-08-13 RX ADMIN — INSULIN LISPRO 5 UNITS: 100 INJECTION, SOLUTION INTRAVENOUS; SUBCUTANEOUS at 23:32

## 2018-08-13 RX ADMIN — MAGNESIUM OXIDE TAB 400 MG (241.3 MG ELEMENTAL MG) 400 MG: 400 (241.3 MG) TAB at 18:18

## 2018-08-13 RX ADMIN — INSULIN LISPRO 1 UNITS: 100 INJECTION, SOLUTION INTRAVENOUS; SUBCUTANEOUS at 05:41

## 2018-08-13 RX ADMIN — METOPROLOL TARTRATE 50 MG: 50 TABLET ORAL at 21:04

## 2018-08-13 RX ADMIN — GABAPENTIN 100 MG: 100 CAPSULE ORAL at 09:11

## 2018-08-13 RX ADMIN — NYSTATIN AND TRIAMCINOLONE ACETONIDE: 100000; 1 CREAM TOPICAL at 18:20

## 2018-08-13 RX ADMIN — MAGNESIUM OXIDE TAB 400 MG (241.3 MG ELEMENTAL MG) 400 MG: 400 (241.3 MG) TAB at 09:11

## 2018-08-13 NOTE — SOCIAL WORK
CM met with pt to discuss follow up care- appointment made with PCP office to see Dr GRAF [facility] 8/20/18 @ 1120- pt made aware of same and on AVS   Attempting to contact pt's mental health clinic [facility] (previously reported as Tom Rene but informed differently this a m )-  Lior Abdi-  left awaiting phone call  Attempted to contact Ada Huizar (community care team) to discuss pt's situation with VM left- awaiting call back  Pt continues to voice the need for constant reminding to take his medications- blaming his noncompliance first on dyslexia and then continues to develop other reasoning- none of which he is putting the responsibility on himself  Asked that I speak to his niece when she comes to visit- aware to have me paged  Will follow up with therapy recommendations for d/c planning however pt likely to be d/c home with OP f/u as pt is ambulating on unit independently without AD- will continue to follow throughout hospitalization

## 2018-08-13 NOTE — OCCUPATIONAL THERAPY NOTE
[address] Evaluation & Treatment Note     Patient Name: Ashlyn Nicolas  MLVXR'V Date: 8/13/2018  Problem List  Patient Active Problem List   Diagnosis    Hyponatremia    [facility] [address] [facility]    Hypertension    Generalized weakness    Type 2 diabetes mellitus with hyperglycemia, without long-term current use of insulin ([facility] )    Ambulatory dysfunction    Acute pancreatitis    Accelerated hypertension    UTI (urinary tract infection)    Other hyperlipidemia     Past Medical History  Past Medical History:   Diagnosis Date    [facility]     GERD (gastroesophageal reflux disease)     Hyperlipidemia     Hypertension     Migraine     Psychiatric disorder      Past Surgical History  Past Surgical History:   Procedure Laterality Date    ESOPHAGOGASTRODUODENOSCOPY N/A 9/7/2016    Procedure: ESOPHAGOGASTRODUODENOSCOPY (EGD); Surgeon: Henrik Amin MD;  Location: [facility]; Service:       RN cleared pt for OT eval    Remains seated in chair with all needs at end of session     08/13/18 1400   Note Type   Note type Eval/Treat   Restrictions/Precautions   Other Precautions Fall Risk  (L UE IV)   Pain Assessment   Pain Assessment No/denies pain   Pain Score No Pain   Home Living   Type of Home Apartment  (7th fl apt; ramp to enter; + elevators)   Home Layout One level   Bathroom Shower/Tub Tub/shower unit   Bathroom Toilet Standard   Bathroom Equipment Grab bars in shower   [address] Other (Comment)  (None)   Additional Comments (Reg bed; - drive)   Prior Function   Level of Emanuel Independent with ADLs and functional mobility   Lives With Alone   ADL Assistance Independent   IADLs Independent   Falls in the last 6 months 1 to 4  (2 times recently)   Vocational On disability   Lifestyle   Autonomy (I) ADLs, Functional mobility without AD, and IADLs     Reciprocal Relationships Niece    Intrinsic Gratification (Music, Basketball, and Friends)   Psychosocial   Psychosocial (WDL) WDL   Subjective   Subjective " I got numbness"    Transfers   Sit to Stand 7  Independent   Stand to Sit 7  Independent   Stand pivot 7  Independent   Additional items (Without AD)   Functional Mobility   Functional Mobility 5  Supervision   Additional Comments (Without AD; slightly unsteady )   Balance   Static Sitting Good   Dynamic Sitting Fair +   Static Standing Fair +   Dynamic Standing Fair   Ambulatory Fair   Activity Tolerance   Activity Tolerance (Fair activity tolerance )   RUE Assessment   RUE Assessment (5/5 t/o)   LUE Assessment   LUE Assessment (5/5 t/o; Numbness)   Hand Function   Gross Motor Coordination Functional   Fine Motor Coordination Functional   Sensation   Light Touch Partial deficits in the LUE   Vision-Basic Assessment   Current Vision (States "I need them my eyes are bad")   Cognition   Overall Cognitive Status WFL   Arousal/Participation Alert; Cooperative   Attention Within functional limits   Orientation Level Oriented X4   Memory Within functional limits   Following Commands Follows all commands and directions without difficulty   Assessment   Limitation Decreased ADL status; Decreased endurance;Decreased self-care trans;Decreased high-level ADLs   Prognosis Good   Assessment Pt admit with uncontrolled DM, weakness, nausea, vomiting, and fall x 2  OT completed extensive review of pt's medical and social history  Pt with h/o DM, GERD, hyperlipidemia, HTN, migraine, and psychiatric disorder  Prior to admit was living alone in [address] [place] [address] and able to complete all tasks (I)'ly  However, reports tasks are more challenging due to neuropathy and decreased vision  Niece is good support system, but he doesn't like to depend on anyone  Pt presents to OT below baseline due to the following performance deficits: balance; stand tolerance; functional mobility; coping; community integration; self care; and IADLs   Therefore, pt would benefit from OT services to achieve optimal level of performance  Occupational performance areas to be addressed include: grooming, bathing, toileting, dressing, activity tolerance, functional mobility, community integration, and clothing management  Based on findings, pt is of high complexity, due to uncontrolled DM  Recommend home with services once medically cleared  High-complexity x 20 mins + 10 mins tx  Goals   Patient Goals ("Keep myself healthy, believe in God, and have good friends")   Short Term Goal #1 1  LB ADL- (I)  ;  2  Toileting- (I)  ; 3  Bed making- (I) without LOB;  4  ADL Txfs- MI/I with most appropriate method  ;  5  Stand Balance- F+ unsupported for clothing management  ;  6  Activity Tolerance-  Fair+ to resume prior roles & routines   Plan   Treatment Interventions ADL retraining;Functional transfer training;UE strengthening/ROM; Patient/family training;Equipment evaluation/education; Activityengagement   Goal Expiration Date 08/20/18   Treatment Day 1   OT Frequency 1-2x/wk   Additional Treatment Session   Start Time 1420   End Time 1430   Treatment Assessment Treatment focused on self care, functional mobility, and activity tolerance  Pt doffed/donned socks (I) with F+ dynamic sit balance  Sit to stand (I)  Retrieved sneakers from bag (I)'ly  Pt donned sneakers in stance against wall with (S) and F dynamic stand balance  Completed functional mobility with (S) without AD  Pt tends to sway to sides due to neuropathy in feet  May benefit from [facility]; PT to determine  Returned to room  Educated pt on being ok to accept help from niece if needed  Fair activity tolerance  Remains seated in chair with all needs at end of session      Recommendation   OT Discharge Recommendation Home with family support  (and services)   OT - OK to Discharge Yes   Barthel Index   Feeding 10   Bathing 5   Grooming Score 5   Dressing Score 5   Bladder Score 10   Bowels Score 10   Toilet Use Score 5   Transfers (Bed/Chair) Score 10 Mobility (Level Surface) Score 10   Stairs Score 5   Barthel Index Score 75     Ileana Villalta

## 2018-08-13 NOTE — DISCHARGE INSTR - DIET
Reviewed CCD diet with patient on a low literacy level  Stated understanding and need to focus on better eating for health  Handouts provided  All questions were answered

## 2018-08-13 NOTE — PHYSICAL THERAPY NOTE
PHYSICAL THERAPY EVALUATION    Time In: 14:01  Time Out: 14:31  Total Time: 30 min  MRN: [id number]    Patient is a 45 y o  male evaluated by Physical Therapy s/p admit to [place] [address] on 8/10/2018 with admitting diagnosis(es) of: Dizziness [R42]  Pancreatitis [K85 90]  Acetonemia [R79 89]  UTI (urinary tract infection) [N39 0]  Anemia [D64 9]  Hyperglycemia [R73 9]  Generalized weakness [R53 1]  Unspecified multiple injuries, initial encounter [T07  XXXA] and with principal problem(s) of: Generalized weakness  Patient Active Problem List   Diagnosis    Hyponatremia    [facility] failure ([facility] )    Hypertension    Generalized weakness    Type 2 diabetes mellitus with hyperglycemia, without long-term current use of insulin [facility]    Ambulatory dysfunction    Acute pancreatitis    Accelerated hypertension    UTI (urinary tract infection)    Other hyperlipidemia     Please refer to H & P for further details  Past Medical History:   Diagnosis Date    Diabetes mellitus ([facility] )     GERD (gastroesophageal reflux disease)     Hyperlipidemia     Hypertension     Migraine     Psychiatric disorder        Past Surgical History:   Procedure Laterality Date    ESOPHAGOGASTRODUODENOSCOPY N/A 9/7/2016    Procedure: ESOPHAGOGASTRODUODENOSCOPY (EGD); Surgeon: Cody Penny MD;  Location: [facility]; Service:        Current Length Of Hospital Stay: 3 day(s)    PT was consulted to assess patient's functional mobility and discharge needs  Ordered are PT Evaluation and treatment with activity level of: up as tolerated  Chart reviewed  RN cleared patient for PT  Comorbidities affecting patient's physical performance at time of assessment include: HTN, DM  Personal factors affecting the patient at time of Initial Evaluation include: history of fall(s), impaired safety awareness and visual impairments   Please locate the subjective and objective findings outlined below:     08/13/18 2383   Note Type Note type Eval/Treat   Pain Assessment   Pain Assessment No/denies pain   Pain Score No Pain   Home Living   Type of [address] entrance; One level; Other (Comment); Elevator  (States 7th fl apt)   Bathroom Shower/Tub Tub/shower unit   Bathroom Toilet Standard   Bathroom Equipment Grab bars [facility] Other (Comment)  (None, per patient)   Additional Comments Patient states he was independent with ambulation without an assistive device   Prior Function   Level of [place] Independent with ADLs and functional mobility   Lives With Alone   ADL Assistance Independent   IADLs Independent   Falls in the last 6 months 1 to 4  (Reports 2)   Vocational On disability   Restrictions/Precautions   Weight Bearing Precautions Per Order No   Other Precautions Fall Risk;Visual impairment; Impulsive   General   Family/Caregiver Present No   Cognition   Overall Cognitive Status WFL   Arousal/Participation Alert   Attention Within functional limits   Orientation Level Oriented X4   Following Commands Follows all commands and directions without difficulty   RLE Assessment   RLE Assessment WFL  (Hip flex 4+/5, knee ext & ankle DF 5/5)   LLE Assessment   LLE Assessment WFL  (Hip flex 4+/5, knee ext & ankle DF 5/5)   Coordination   Movements are Fluid and Coordinated 1   Sensation (Reports numbness left flank and left anterior thigh )   Light Touch   RLE Light Touch Impaired   RLE Light Touch Comments (Lower leg/foot)   LLE Light Touch Impaired   LLE Light Touch Comments Lower leg/foot; reports left more impaired than right   Transfers   Sit to Stand 7  Independent   Stand to Sit 7  Independent   Ambulation/Elevation   Gait pattern (Occasional stagger with self-recovery 100% of the time)   Gait Assistance 5  Supervision  (For safety)   Assistive Device None   Distance 220 ft   Stair Management Assistance Not tested   Balance   Static Sitting Good   Dynamic Sitting Good   Static Standing Good Dynamic Standing Fair +   Endurance Deficit   Endurance Deficit No   Activity Tolerance   Activity Tolerance Patient tolerated treatment well   Assessment   Prognosis Fair   Problem List Impaired judgement;Decreased safety awareness; Impaired vision;Decreased skin integrity   Assessment In summary, guiding factors including patient history, examination of body system(s), clinical presentation and clinical decision making were considered  Patient presents with comorbid conditions that impact function, lacking physical support, recent hospitalization and with living environment deficits  Patient also presents with (+) multiple healing ulcerations bilateral knees/lower legs (right more than left), large foot callouses (right medial great toe, left base of 1st met head), impaired safety awareness and gait abilities  Clinical presentation is evolving at this time  The assigned level of complexity is: moderate  Patient would benefit from continued PT treatment to address deficits as defined above and restore or maximize level of functional mobility with consistency  From PT/mobility standpoint, preliminary discharge recommendation would be: home with services, in order to facilitate return to prior/baseline level of function, increased independence in home alone environment and return to apartment  Barriers to Discharge None   Goals   Patient Goals "Keep myself healthy, believe in God, have good friends, have a good family and everybody love one another"  LTG Expiration Date 08/23/18   Long Term Goal #1 1  Patient will perform all bed mobility with independence in order to get in/out of bed  2  Patient will perform all functional transfers with independence in order to facilitate transfers from one surface to another     3  Patient will ambulate with modified independence x at least 200 ft with least restrictive assistive device prn in order to safely enter/exit apartment building and access all necessary areas of apartment  4  Patient will ascend/descend ramp with least restrictive assistive device prn with modified independence to enter/exit apartment building  Treatment Day 0   Plan   Treatment/Interventions ADL retraining;Functional transfer training;LE strengthening/ROM; Elevations; Therapeutic exercise; Endurance training;Patient/family training;Cognitive reorientation;Equipment eval/education; Bed mobility;Gait training;Spoke to nursing;OT   PT Frequency Other (Comment)  (At least 3x/wk in 3 PT treatment sessions)   Recommendation   Recommendation Other (Comment)  (Home with services)   Equipment Recommended Other (Comment)  (To be determined)   PT - OK to Discharge (When medically cleared)   Barthel Index   Feeding 10   Bathing 5   Grooming Score 5   Dressing Score 5   Bladder Score 10   Bowels Score 10   Toilet Use Score 5   Transfers (Bed/Chair) Score 10   Mobility (Level Surface) Score 10   Stairs Score 5   Barthel Index Score 75     Patient returned to chair at bedside; patient positioned with all needs, including call bell, within reach      Gaetano Tavares, PT, DPT

## 2018-08-13 NOTE — NURSING NOTE
Assessment  Unchanged  PT was incontinent of Bowel and PT was visible up stating that the RN was laughing at him  However, RN was not in room when incident happen  RN try to explain to PT that there was a misunderstanding and no one was laughing at him  Denies any pain  Will continue to monitor call bell within reach

## 2018-08-13 NOTE — NURSING NOTE
AOX3 HR Regular Lungs decreased clear + Bowel Sounds x4 + PP ABD soft  Denies any pain  Will continue to monitor call bell within reach

## 2018-08-13 NOTE — PROGRESS NOTES
Patient Name: Ashlyn Nicolas  Patient MRN: [id number]  Date: 08/13/18  Service: [facility]    Subjective   Patient denies any abdominal pain this morning  Reports poor sleep overnight  States he still feels numbness/tingling of his left arm and left flank  Did report some incontinence of stool without blood noted in stool  Vitals  Blood pressure 142/83, pulse 82, temperature (!) 96 1 °F (35 6 °C), temperature source Temporal, resp  rate 18, height 5' 6 5" (1 689 m), weight 51 kg (112 lb 7 oz), SpO2 97 %  Physical Exam:     General Appearance:    Awake, alert, oriented x3, no distress, thin, well developed   Head:    Normocephalic without obvious abnormality   Eyes:    PERRL, conjunctiva/corneas clear, EOM's intact        Neck:   Supple, no adenopathy   Throat:   Mucous membranes moist   Lungs:     Clear to auscultation bilaterally, no wheezing or rhonchi   Heart:    Regular rate and rhythm, S1 and S2 normal, no murmur   Abdomen:     Soft, non-tender, non-distended  bowel sounds active  No    masses, rebound or guarding  Extremities:   Extremities normal  No clubbing, cyanosis or edema   Psych  Derm:   Normal affect    No jaundice   Neurologic:   CNII-XII grossly intact   Speech intact         Laboratory Studies    Results from last 7 days  Lab Units 08/11/18  0514 08/11/18  0016 08/10/18  1913   WBC Thousand/uL 6 10  --  6 70   HEMOGLOBIN g/dL 10 5*  --  12 3*   HEMATOCRIT % 30 5*  --  36 5*   PLATELETS Thousands/uL 168 220 201   INR   --  0 87*  --        Results from last 7 days  Lab Units 08/12/18  0508 08/11/18  0514 08/10/18  1913   SODIUM mmol/L 136* 137 135*   POTASSIUM mmol/L 3 4* 3 4* 3 9   CHLORIDE mmol/L 104 106 100   CO2 mmol/L 27 29 28   BUN mg/dL 6 10 14   CREATININE mg/dL 0 56* 0 59* 0 61*   CALCIUM mg/dL 8 3* 8 4 9 5   TOTAL PROTEIN g/dL  --  6 8 8 0   BILIRUBIN TOTAL mg/dL  --  0 60 1 00   ALK PHOS U/L  --  81 110   ALT U/L  --  42 51   AST U/L  --  39 46   GLUCOSE RANDOM mg/dL 119* 104* 276*     Lab Results   Component Value Date    LIPASE 4,811 (H) 08/11/2018    LIPASE 3,534 (H) 08/10/2018    LIPASE 746 (H) 07/28/2018       Imaging and Other Studies      Inhouse Medications     Current Facility-Administered Medications:     acetaminophen (TYLENOL) tablet 650 mg, 650 mg, Oral, Q6H PRN, 650 mg at 08/11/18 0006    amLODIPine (NORVASC) tablet 10 mg, 10 mg, Oral, Daily    citalopram (CeleXA) tablet 20 mg, 20 mg, Oral, Daily, 20 mg at 08/12/18 0909    docusate sodium (COLACE) capsule 100 mg, 100 mg, Oral, BID, 100 mg at 08/12/18 1704    enoxaparin (LOVENOX) subcutaneous injection 40 mg, 40 mg, Subcutaneous, Daily, 40 mg at 08/12/18 0908    gabapentin (NEURONTIN) capsule 100 mg, 100 mg, Oral, BID, 100 mg at 08/12/18 1704    hydrALAZINE (APRESOLINE) injection 5 mg, 5 mg, Intravenous, Q6H PRN    insulin lispro (HumaLOG) 100 units/mL subcutaneous injection 1-5 Units, 1-5 Units, Subcutaneous, Q6H [facility], 1 Units at 08/13/18 0541 **AND** Fingerstick Glucose (POCT), , , Q6H    magnesium oxide (MAG-OX) tablet 400 mg, 400 mg, Oral, BID, 400 mg at 08/12/18 1704    metoprolol tartrate (LOPRESSOR) tablet 50 mg, 50 mg, Oral, Q12H SHERRY, 50 mg at 08/12/18 2127    mirtazapine (REMERON) tablet 15 mg, 15 mg, Oral, HS, 15 mg at 08/12/18 2127    ondansetron (ZOFRAN) injection 4 mg, 4 mg, Intravenous, Once    ondansetron (ZOFRAN) injection 4 mg, 4 mg, Intravenous, Q4H PRN    pantoprazole (PROTONIX) injection 40 mg, 40 mg, Intravenous, Q24H SHERRY, 40 mg at 08/12/18 0910    polyethylene glycol (MIRALAX) packet 17 g, 17 g, Oral, Daily PRN    senna (SENOKOT) tablet 8 6 mg, 1 tablet, Oral, Daily, 8 6 mg at 08/12/18 0909    Insert peripheral IV, , , Once **AND** sodium chloride (PF) 0 9 % injection 3 mL, 3 mL, Intravenous, PRN    sodium chloride 0 9 % infusion, 100 mL/hr, Intravenous, Continuous, 100 mL/hr at 08/13/18 0131    traMADol (ULTRAM) tablet 50 mg, 50 mg, Oral, Q6H PRN, 50 mg at 08/12/18 5091      Assessment/Plan:  1  Mild recurrent acute pancreatitis, unclear etiology, slowly improving  Hereditary vs medication-related vs viral possible  Observe off atorvastatin  Okay for toast/crackers with clears  If tolerated, consider advancing diet for lunch  2  Acute normocytic anemia, normal on admission  Suspect dilutional    3  GERD  Last EGD 9/2016 showing severe ulcerative esophagitis, candidiasis, mild gastritis/duodenitis  Treated with Diflucan  Currently on Protonix IV Q12 hours         Anand Farris PA-C

## 2018-08-13 NOTE — PROGRESS NOTES
Progress Note - Kristin Mackay 1965, 45 y o  male MRN: [id number]    Unit/Bed#: 7T [facility] 704-02 Encounter: [id number]    Primary Care Provider: Santiago Cesar DO   Date and time admitted to hospital: 8/10/2018  6:06 PM        Acute pancreatitis   Assessment & Plan    Unclear etiology  Patient has pancreatitis maybe related to medication use,viral infection or idiopathic  Continue with IV hydration but decrease to 50 cc/hr  Continue with pain medication if needed  Patient is having loose stools  will send stool culture and stool c diff          Accelerated hypertension   Assessment & Plan    Blood pressure at the time of admission was uncontrolled  Blood pressure is better controlled now with adjusted medications          Other hyperlipidemia   Assessment & Plan    Not taking any statins at home  lipid panel acceptable  Ambulatory dysfunction   Assessment & Plan    With frequent falls (at least 2 falls over the past week) without head injury most likely secondary to diabetic polyneuropathy in the setting of poorly-controlled diabetes  Fall precaution  PT/OT evaluation   on board        Type 2 diabetes mellitus with hyperglycemia, without long-term current use of insulin ([facility] )   Assessment & Plan    Lab Results   Component Value Date    HGBA1C 12 8 (H) 08/11/2018   Hemoglobin A1c is 12 8  Patient on a full liquid diet  Patient was counseled about compliance  Continue with Accu-Cheks, insulin sliding scale for coverage  Continue with metformin  also add lantus 10 units subcut daily  will need it as unable to use a lot of oral dm medications due to acute pancreatitis bout  (P) [phone]        Hyponatremia   Assessment & Plan    Secondary to hyperglycemia  Now resolved  sodium is 136        * Generalized weakness   Assessment & Plan    Multifactorial secondary to poorly-controlled diabetes  Will treat multiple comorbidities and reassess  Patient lives alone  Dosenot take his medications when he feels tired  Concern of Complaince  Fall precautions  PT/OT evaluation   for discharge plan        Diabetic ketoacidosis without coma (HCC)-resolved as of 2018   Assessment & Plan    Lab Results   Component Value Date    HGBA1C 12 8 (H) 2018       Recent Labs      18   1643  18   2049  18   2349  18   0540   POCGLU  139*  308*  205*  186*       Blood Sugar Average: Last 72 hrs:  (P) 159 75        liver hemangioma and renal cyst:outpatient follow up  VTE Pharmacologic Prophylaxis:   Pharmacologic: Enoxaparin (Lovenox)  Mechanical VTE Prophylaxis in Place: Yes    Patient Centered Rounds: I have performed bedside rounds with nursing staff today  Discussions with Specialists or Other Care Team Provider:     Education and Discussions with Family / Patient: discussed with patient about diabetes management and pancreatitis    Time Spent for Care: 30 minutes  More than 50% of total time spent on counseling and coordination of care as described above  Current Length of Stay: 3 day(s)    Current Patient Status: Inpatient   Certification Statement: The patient will continue to require additional inpatient hospital stay due to pancreatitis    Discharge Plan: tomorrow    Code Status: Level 1 - Full Code      Subjective:   Patient complains of diarrhea  denies any chest pain or sob  complains of some numbness in his left side of the body  no abd pain today    Objective:     Vitals:   Temp (24hrs), Av °F (36 1 °C), Min:96 1 °F (35 6 °C), Max:97 9 °F (36 6 °C)    HR:  [81-83] 81  Resp:  [18] 18  BP: (142-174)/(80-83) 163/83  SpO2:  [97 %-99 %] 97 %  Body mass index is 17 88 kg/m²  Input and Output Summary (last 24 hours):        Intake/Output Summary (Last 24 hours) at 18 1121  Last data filed at 18 0548   Gross per 24 hour   Intake             1620 ml   Output              600 ml   Net             1020 ml       Physical Exam: Physical Exam   Constitutional: He is oriented to person, place, and time  He appears well-developed and well-nourished  HENT:   Head: Normocephalic and atraumatic  Right Ear: External ear normal    Left Ear: External ear normal    Mouth/Throat: Oropharynx is clear and moist    Eyes: Conjunctivae and EOM are normal  Pupils are equal, round, and reactive to light  Neck: Normal range of motion  Neck supple  Cardiovascular: Normal rate, regular rhythm, normal heart sounds and intact distal pulses  Pulmonary/Chest: Effort normal and breath sounds normal    Abdominal: Soft  Bowel sounds are normal  He exhibits no mass  There is no tenderness  There is no rebound and no guarding  Genitourinary:   Genitourinary Comments: deferred   Musculoskeletal:   Decreased sensation left side of chest   Neurological: He is alert and oriented to person, place, and time  He has normal reflexes  Skin: Rash noted  Psychiatric: He has a normal mood and affect  Nursing note and vitals reviewed          Additional Data:     Labs:      Results from last 7 days  Lab Units 08/11/18  0514   WBC Thousand/uL 6 10   HEMOGLOBIN g/dL 10 5*   HEMATOCRIT % 30 5*   PLATELETS Thousands/uL 168   NEUTROS PCT % 74*   LYMPHS PCT % 18*   MONOS PCT % 7   EOS PCT % 1       Results from last 7 days  Lab Units 08/12/18  0508 08/11/18  0514   SODIUM mmol/L 136* 137   POTASSIUM mmol/L 3 4* 3 4*   CHLORIDE mmol/L 104 106   CO2 mmol/L 27 29   BUN mg/dL 6 10   CREATININE mg/dL 0 56* 0 59*   CALCIUM mg/dL 8 3* 8 4   TOTAL PROTEIN g/dL  --  6 8   BILIRUBIN TOTAL mg/dL  --  0 60   ALK PHOS U/L  --  81   ALT U/L  --  42   AST U/L  --  39   GLUCOSE RANDOM mg/dL 119* 104*       Results from last 7 days  Lab Units 08/11/18  0016   INR  0 87*       Results from last 7 days  Lab Units 08/13/18  0540 08/12/18  2349 08/12/18  2049 08/12/18  1643 08/12/18  1153 08/12/18  0525 08/11/18  2353 08/11/18  1741 08/11/18  1113 08/11/18  6300 08/11/18  0104 08/10/18  2354   POC GLUCOSE mg/dl 186* 205* 308* 139* 220* 130* 179* 121* 126* 116* 113* 74       Results from last 7 days  Lab Units 08/11/18  0016   HEMOGLOBIN A1C % 12 8*         * I Have Reviewed All Lab Data Listed Above  * Additional Pertinent Lab Tests Reviewed:  [facility] Admission Reviewed    Imaging:    Imaging Reports Reviewed Today Include: ct abd  Imaging Personally Reviewed by Myself Includes:  none    Recent Cultures (last 7 days):           Last 24 Hours Medication List:     Current Facility-Administered Medications:  acetaminophen 650 mg Oral Q6H PRN Karie Orozco MD    amLODIPine 10 mg Oral Daily Wyatt Ang MD    citalopram 20 mg Oral Daily Karie Orozco MD    docusate sodium 100 mg Oral BID Karie Orozco MD    enoxaparin 40 mg Subcutaneous Daily Karie Orozco MD    gabapentin 100 mg Oral BID Karie Orozco MD    hydrALAZINE 5 mg Intravenous Q6H PRN Karie Orozco MD    insulin glargine 10 Units Subcutaneous QAM Abhijeet Norman MD    insulin lispro 1-5 Units Subcutaneous Q6H [facility] Karie Orozco MD    magnesium oxide 400 mg Oral BID Wyatt Ang MD    metoprolol tartrate 50 mg Oral Q12H [facility] Karie Orozco MD    mirtazapine 15 mg Oral HS Karie Orozco MD    ondansetron 4 mg Intravenous Once hSaronda Toribio MD    ondansetron 4 mg Intravenous Q4H PRN Karie Orozco MD    pantoprazole 40 mg Intravenous Q24H [facility] Karie Orozco MD    polyethylene glycol 17 g Oral Daily PRN Karie Orozco MD    potassium chloride 20 mEq Intravenous Once Abhijeet Norman MD    senna 1 tablet Oral Daily Karie Orozco MD    sodium chloride (PF) 3 mL Intravenous PRN Sharonda Toribio MD    sodium chloride 50 mL/hr Intravenous Continuous Abhijeet Norman MD Last Rate: 100 mL/hr (08/13/18 0131)   traMADol 50 mg Oral Q6H PRN Karie Orozco MD         Today, Patient Was Seen By: Abhijeet Norman MD    ** Please Note: Dictation voice to text software may have been used in the creation of this document   **

## 2018-08-13 NOTE — ASSESSMENT & PLAN NOTE
Lab Results   Component Value Date    HGBA1C 12 8 (H) 08/11/2018       Recent Labs      08/12/18   1643  08/12/18   2049  08/12/18   2349  08/13/18   0540   POCGLU  139*  308*  205*  186*       Blood Sugar Average: Last 72 hrs:  (P) 159 75

## 2018-08-13 NOTE — PLAN OF CARE
Problem: OCCUPATIONAL THERAPY ADULT  Goal: Performs self-care activities at highest level of function for planned discharge setting  See evaluation for individualized goals  Treatment Interventions: ADL retraining, Functional transfer training, UE strengthening/ROM, Patient/family training, Equipment evaluation/education, Activityengagement          See flowsheet documentation for full assessment, interventions and recommendations  Limitation: Decreased ADL status, Decreased endurance, Decreased self-care trans, Decreased high-level ADLs  Prognosis: Good  Assessment: Pt admit with uncontrolled DM, weakness, nausea, vomiting, and fall x 2  OT completed extensive review of pt's medical and social history  Pt with h/o DM, GERD, hyperlipidemia, HTN, migraine, and psychiatric disorder  Prior to admit was living alone in [address] [place] [address] and able to complete all tasks (I)'ly  However, reports tasks are more challenging due to neuropathy and decreased vision  Niece is good support system, but he doesn't like to depend on anyone  Pt presents to OT below baseline due to the following performance deficits: balance; stand tolerance; functional mobility; coping; community integration; self care; and IADLs  Therefore, pt would benefit from OT services to achieve optimal level of performance  Occupational performance areas to be addressed include: grooming, bathing, toileting, dressing, activity tolerance, functional mobility, community integration, and clothing management  Based on findings, pt is of high complexity, due to uncontrolled DM  Recommend home with services once medically cleared  High-complexity x 20 mins + 10 mins tx  OT Discharge Recommendation: Home with family support (and services)  OT - OK to Discharge:  Yes

## 2018-08-13 NOTE — PLAN OF CARE
Problem: PHYSICAL THERAPY ADULT  Goal: Performs mobility at highest level of function for planned discharge setting  See evaluation for individualized goals  Treatment/Interventions: ADL retraining, Functional transfer training, LE strengthening/ROM, Elevations, Therapeutic exercise, Endurance training, Patient/family training, Cognitive reorientation, Equipment eval/education, Bed mobility, Gait training, Spoke to nursing, OT  Equipment Recommended: Other (Comment) (To be determined)       See flowsheet documentation for full assessment, interventions and recommendations  Prognosis: Fair  Problem List: Impaired judgement, Decreased safety awareness, Impaired vision, Decreased skin integrity  Assessment: In summary, guiding factors including patient history, examination of body system(s), clinical presentation and clinical decision making were considered  Patient presents with comorbid conditions that impact function, lacking physical support, recent hospitalization and with living environment deficits  Patient also presents with (+) multiple healing ulcerations bilateral knees/lower legs (right more than left), large foot callouses (right medial great toe, left base of 1st met head), impaired safety awareness and gait abilities  Clinical presentation is evolving at this time  The assigned level of complexity is: moderate  Patient would benefit from continued PT treatment to address deficits as defined above and restore or maximize level of functional mobility with consistency  From PT/mobility standpoint, preliminary discharge recommendation would be: home with services, in order to facilitate return to prior/baseline level of function, increased independence in home alone environment and return to apartment  Barriers to Discharge: None     Recommendation: Other (Comment) (Home with services)     PT - OK to Discharge:  (When medically cleared)    See flowsheet documentation for full assessment

## 2018-08-14 LAB
ANION GAP SERPL CALCULATED.3IONS-SCNC: 7 MMOL/L (ref 5–14)
BUN SERPL-MCNC: 8 MG/DL (ref 5–25)
CALCIUM SERPL-MCNC: 8.6 MG/DL (ref 8.4–10.2)
CHLORIDE SERPL-SCNC: 101 MMOL/L (ref 97–108)
CO2 SERPL-SCNC: 32 MMOL/L (ref 22–30)
CREAT SERPL-MCNC: 0.69 MG/DL (ref 0.7–1.5)
GFR SERPL CREATININE-BSD FRML MDRD: 124 ML/MIN/1.73SQ M
GLUCOSE SERPL-MCNC: 130 MG/DL (ref 70–99)
GLUCOSE SERPL-MCNC: 132 MG/DL (ref 70–99)
GLUCOSE SERPL-MCNC: 134 MG/DL (ref 70–99)
GLUCOSE SERPL-MCNC: 233 MG/DL (ref 70–99)
GLUCOSE SERPL-MCNC: 312 MG/DL (ref 70–99)
LIPASE SERPL-CCNC: 1153 U/L (ref 23–300)
POTASSIUM SERPL-SCNC: 3.6 MMOL/L (ref 3.6–5)
SODIUM SERPL-SCNC: 140 MMOL/L (ref 137–147)

## 2018-08-14 PROCEDURE — 82948 REAGENT STRIP/BLOOD GLUCOSE: CPT

## 2018-08-14 PROCEDURE — 97530 THERAPEUTIC ACTIVITIES: CPT

## 2018-08-14 PROCEDURE — 80048 BASIC METABOLIC PNL TOTAL CA: CPT | Performed by: FAMILY MEDICINE

## 2018-08-14 PROCEDURE — 83690 ASSAY OF LIPASE: CPT | Performed by: FAMILY MEDICINE

## 2018-08-14 PROCEDURE — 97116 GAIT TRAINING THERAPY: CPT

## 2018-08-14 PROCEDURE — 99232 SBSQ HOSP IP/OBS MODERATE 35: CPT | Performed by: FAMILY MEDICINE

## 2018-08-14 PROCEDURE — 97110 THERAPEUTIC EXERCISES: CPT

## 2018-08-14 PROCEDURE — 97535 SELF CARE MNGMENT TRAINING: CPT

## 2018-08-14 RX ORDER — HYDROCHLOROTHIAZIDE 12.5 MG/1
12.5 TABLET ORAL DAILY
Status: DISCONTINUED | OUTPATIENT
Start: 2018-08-14 | End: 2018-08-16 | Stop reason: HOSPADM

## 2018-08-14 RX ORDER — INSULIN GLARGINE 100 [IU]/ML
10 INJECTION, SOLUTION SUBCUTANEOUS EVERY 12 HOURS SCHEDULED
Status: DISCONTINUED | OUTPATIENT
Start: 2018-08-14 | End: 2018-08-15

## 2018-08-14 RX ADMIN — NYSTATIN AND TRIAMCINOLONE ACETONIDE: 100000; 1 CREAM TOPICAL at 17:45

## 2018-08-14 RX ADMIN — PANTOPRAZOLE SODIUM 40 MG: 40 TABLET, DELAYED RELEASE ORAL at 06:00

## 2018-08-14 RX ADMIN — INSULIN GLARGINE 10 UNITS: 100 INJECTION, SOLUTION SUBCUTANEOUS at 21:15

## 2018-08-14 RX ADMIN — CITALOPRAM HYDROBROMIDE 20 MG: 20 TABLET ORAL at 08:56

## 2018-08-14 RX ADMIN — INSULIN LISPRO 2 UNITS: 100 INJECTION, SOLUTION INTRAVENOUS; SUBCUTANEOUS at 12:36

## 2018-08-14 RX ADMIN — MIRTAZAPINE 15 MG: 15 TABLET, FILM COATED ORAL at 21:15

## 2018-08-14 RX ADMIN — SODIUM CHLORIDE 50 ML/HR: 9 INJECTION, SOLUTION INTRAVENOUS at 05:53

## 2018-08-14 RX ADMIN — NYSTATIN AND TRIAMCINOLONE ACETONIDE: 100000; 1 CREAM TOPICAL at 08:57

## 2018-08-14 RX ADMIN — ENOXAPARIN SODIUM 40 MG: 40 INJECTION SUBCUTANEOUS at 08:56

## 2018-08-14 RX ADMIN — PANTOPRAZOLE SODIUM 40 MG: 40 TABLET, DELAYED RELEASE ORAL at 16:46

## 2018-08-14 RX ADMIN — METOPROLOL TARTRATE 50 MG: 50 TABLET ORAL at 21:15

## 2018-08-14 RX ADMIN — GABAPENTIN 100 MG: 100 CAPSULE ORAL at 17:46

## 2018-08-14 RX ADMIN — INSULIN GLARGINE 10 UNITS: 100 INJECTION, SOLUTION SUBCUTANEOUS at 08:57

## 2018-08-14 RX ADMIN — METOPROLOL TARTRATE 50 MG: 50 TABLET ORAL at 08:56

## 2018-08-14 RX ADMIN — AMLODIPINE BESYLATE 10 MG: 10 TABLET ORAL at 08:55

## 2018-08-14 RX ADMIN — HYDROCHLOROTHIAZIDE 12.5 MG: 12.5 TABLET ORAL at 16:46

## 2018-08-14 RX ADMIN — GABAPENTIN 100 MG: 100 CAPSULE ORAL at 08:56

## 2018-08-14 NOTE — ASSESSMENT & PLAN NOTE
Unclear etiology  Patient has pancreatitis maybe related to medication use,viral infection or idiopathic  Clinically improving  stop iv fluids and cont current diet and observe  Continue with pain medication if needed  Patient is having loose stools  will send stool culture and stool c diff

## 2018-08-14 NOTE — NURSING NOTE
Assessment unchanged  Denies any pain  PT resting comfortable no distress noted  Will continue to monitor call bell within reach

## 2018-08-14 NOTE — PHYSICAL THERAPY NOTE
Session 32' ( [id number])     08/14/18 1130   Pain Assessment   Pain Assessment No/denies pain   Pain Score (reports uncomfortable feeling L flank)   Restrictions/Precautions   Weight Bearing Precautions Per Order No   General   Chart Reviewed Yes   Family/Caregiver Present No   Cognition   Overall Cognitive Status WFL   Following Commands Follows all commands and directions without difficulty   Subjective   Subjective i just worry I will kep falling - I want to get my life on track   Transfers   Sit to Stand 7  Independent   Stand to Sit 7  Independent   Ambulation/Elevation   Gait pattern (occ waver but no LOB)   Gait Assistance 5  Supervision   Additional items (with cane and no AD)   Distance (over 300' and on/off elevator with no AD then 220' with cane)   Ramp Assistance (up and down x 2 with  no rail and S)   Balance   Static Standing Fair +   Dynamic Standing Fair -   Ambulatory Fair +   Activity Tolerance   Activity Tolerance Patient tolerated treatment well   Exercises   Balance training  pt performed head turns and nods, during amb , retro amb , 360* turns, high march gt, picking up object from floor- all with S to occ CG  Tandem position with head turns andUE elevations with occ min A of 1  Eyes closed with perturnbations  with + LOB backwards x 2    Assessment   Prognosis Fair   Problem List Impaired judgement; Impaired balance   Assessment Pt constantly talking with concerns regarding falling and getting back to a normal life- appears anxious  Trial with cane for increase stability and confidence with pt request of getting a cane for home - SS made aware  Pt amb with occ waver off straight path but no LOB - + LOB and unsteadiness with higher level balance challenges- rec outpatient PT for same /89 at rest then 196/96 after activity - nurse made aware  Pt did well on the  ramp with no unsteadiness noted   Pt initially  Reports a funny feeling in the head - not quite dizziness- no change with activity- rec  Home at [place] with cane and OP PT   Goals   Patient Goals get my balance and get back on track   LTG Expiration Date 08/23/18   Treatment Day 1   Plan   Treatment/Interventions (cont as per POC)   PT Frequency (3x/week in 3 PT sessions)   Recommendation   Recommendation Outpatient PT   Equipment Recommended Cane   PT - OK to Discharge Yes   Additional Comments when medically cleared

## 2018-08-14 NOTE — PROGRESS NOTES
Progress Note - Cody Harrell 1965, 45 y o  male MRN: [id number]    Unit/Bed#: 7T [facility] 704-02 Encounter: [id number]    Primary Care Provider: Geri Castillo DO   Date and time admitted to hospital: 8/10/2018  6:06 PM        Acute pancreatitis   Assessment & Plan    Unclear etiology  Patient has pancreatitis maybe related to medication use,viral infection or idiopathic  Clinically improving  stop iv fluids and cont current diet and observe  Continue with pain medication if needed  Patient is having loose stools  will send stool culture and stool c diff          Accelerated hypertension   Assessment & Plan    Patient still has episodes of elevated blood pressures into the 180s and 190s  Will continue the Norvasc and the metoprolol and add HCTZ 12 5 mg daily and observe for now  Other hyperlipidemia   Assessment & Plan    Not taking any statins at home  lipid panel acceptable  Ambulatory dysfunction   Assessment & Plan    With frequent falls (at least 2 falls over the past week) without head injury most likely secondary to diabetic polyneuropathy in the setting of poorly-controlled diabetes  Fall precaution  PT/OT evaluation   on board        Type 2 diabetes mellitus with hyperglycemia, without long-term current use of insulin ([facility] )   Assessment & Plan    Lab Results   Component Value Date    HGBA1C 12 8 (H) 08/11/2018   Hemoglobin A1c is 12 8  Patient on a full liquid diet  Patient was counseled about compliance  Continue with Accu-Cheks, insulin sliding scale for coverage  Continue with metformin  Increase Lantus to 10 U twice daily  will need it as unable to use a lot of oral dm medications due to acute pancreatitis bout  (P) [phone]        Hyponatremia   Assessment & Plan    Secondary to hyperglycemia  Now resolved  sodium is 136        * Generalized weakness   Assessment & Plan    Multifactorial secondary to poorly-controlled diabetes  Will treat multiple comorbidities and reassess  Patient lives alone  Dosenot take his medications when he feels tired  Concern of Complaince  Fall precautions  PT/OT evaluation   for discharge plan          VTE Pharmacologic Prophylaxis:   Pharmacologic: Enoxaparin (Lovenox)  Mechanical VTE Prophylaxis in Place: Yes    Patient Centered Rounds: I have performed bedside rounds with nursing staff today  Discussions with Specialists or Other Care Team Provider: none    Education and Discussions with Family / Patient:  Educated the patient on diabetes care    Time Spent for Care: 30 minutes  More than 50% of total time spent on counseling and coordination of care as described above  Current Length of Stay: 4 day(s)    Current Patient Status: Inpatient   Certification Statement: The patient will continue to require additional inpatient hospital stay due to Uncontrolled blood pressure and blood sugar    Discharge Plan:  Tomorrow    Code Status: Level 1 - Full Code      Subjective:   Patient states that he is now tolerating his oral diet  He has some intermittent abdominal pains but they are getting better  Denies any nausea or vomiting  No diarrhea reported today  Objective:     Vitals:   Temp (24hrs), Av 3 °F (36 3 °C), Min:96 7 °F (35 9 °C), Max:97 9 °F (36 6 °C)    HR:  [82-86] 86  Resp:  [18] 18  BP: (149-196)/(64-96) 196/96  SpO2:  [100 %] 100 %  Body mass index is 17 88 kg/m²  Input and Output Summary (last 24 hours): Intake/Output Summary (Last 24 hours) at 18 1534  Last data filed at 18 1501   Gross per 24 hour   Intake          2320 83 ml   Output                0 ml   Net          2320 83 ml       Physical Exam:     Physical Exam   Constitutional: He is oriented to person, place, and time  He appears well-developed and well-nourished  HENT:   Head: Normocephalic and atraumatic     Right Ear: External ear normal    Left Ear: External ear normal    Mouth/Throat: Oropharynx is clear and moist    Eyes: Conjunctivae and EOM are normal  Pupils are equal, round, and reactive to light  Neck: Normal range of motion  Neck supple  Cardiovascular: Normal rate, regular rhythm, normal heart sounds and intact distal pulses  Pulmonary/Chest: Effort normal and breath sounds normal    Abdominal: Soft  Bowel sounds are normal  He exhibits no mass  There is no tenderness  There is no rebound and no guarding  Genitourinary:   Genitourinary Comments: deferred   Musculoskeletal: Normal range of motion  Neurological: He is alert and oriented to person, place, and time  He has normal reflexes  Skin: Skin is warm and dry  No rash noted  Psychiatric: He has a normal mood and affect  Nursing note and vitals reviewed  Additional Data:     Labs:      Results from last 7 days  Lab Units 08/11/18  0514   WBC Thousand/uL 6 10   HEMOGLOBIN g/dL 10 5*   HEMATOCRIT % 30 5*   PLATELETS Thousands/uL 168   NEUTROS PCT % 74*   LYMPHS PCT % 18*   MONOS PCT % 7   EOS PCT % 1       Results from last 7 days  Lab Units 08/14/18  0519  08/11/18  0514   SODIUM mmol/L 140  < > 137   POTASSIUM mmol/L 3 6  < > 3 4*   CHLORIDE mmol/L 101  < > 106   CO2 mmol/L 32*  < > 29   BUN mg/dL 8  < > 10   CREATININE mg/dL 0 69*  < > 0 59*   CALCIUM mg/dL 8 6  < > 8 4   TOTAL PROTEIN g/dL  --   --  6 8   BILIRUBIN TOTAL mg/dL  --   --  0 60   ALK PHOS U/L  --   --  81   ALT U/L  --   --  42   AST U/L  --   --  39   GLUCOSE RANDOM mg/dL 134*  < > 104*   < > = values in this interval not displayed      Results from last 7 days  Lab Units 08/11/18  0016   INR  0 87*       Results from last 7 days  Lab Units 08/14/18  1132 08/14/18  0544 08/13/18  2327 08/13/18  1611 08/13/18  1111 08/13/18  0540 08/12/18  2349 08/12/18  2049 08/12/18  1643 08/12/18  1153 08/12/18  0525 08/11/18  2353   POC GLUCOSE mg/dl 233* 132* 460* 278* 211* 186* 205* 308* 139* 220* 130* 179*       Results from last 7 days  Lab Units 08/11/18  0016   HEMOGLOBIN A1C % 12 8*         * I Have Reviewed All Lab Data Listed Above  * Additional Pertinent Lab Tests Reviewed: Alcides [facility] Admission Reviewed    Imaging:    Imaging Reports Reviewed Today Include: none  Imaging Personally Reviewed by Myself Includes:  none    Recent Cultures (last 7 days):           Last 24 Hours Medication List:     Current Facility-Administered Medications:  acetaminophen 650 mg Oral Q6H PRN Harish Silverio MD    amLODIPine 10 mg Oral Daily Manuel Puri MD    citalopram 20 mg Oral Daily Harish Silverio MD    docusate sodium 100 mg Oral BID Harish Silverio MD    enoxaparin 40 mg Subcutaneous Daily Harish Silverio MD    gabapentin 100 mg Oral BID Harish Silverio MD    hydrochlorothiazide 12 5 mg Oral Daily Hermila Cardenas MD    insulin glargine 10 Units Subcutaneous Q12H [facility] Hermila Cardenas MD    insulin lispro 1-5 Units Subcutaneous Q6H [facility] Harish Silverio MD    metoprolol tartrate 50 mg Oral Q12H [facility] Harish Silverio MD    mirtazapine 15 mg Oral HS Harish Silverio MD    nystatin-triamcinolone  Topical BID Hermila Cardenas MD    ondansetron 4 mg Intravenous Once Armaan Diaz MD    ondansetron 4 mg Intravenous Q4H PRN Harish Silverio MD    pantoprazole 40 mg Oral BID AC Hermila Cardenas MD    polyethylene glycol 17 g Oral Daily PRN Harish Silverio MD    senna 1 tablet Oral Daily Harish Silverio MD    sodium chloride (PF) 3 mL Intravenous PRN Armaan Diaz MD    sodium chloride 50 mL/hr Intravenous Continuous Hermila Cardenas MD Last Rate: Stopped (08/14/18 1130)        Today, Patient Was Seen By: Hermila Cardenas MD    ** Please Note: Dictation voice to text software may have been used in the creation of this document   **

## 2018-08-14 NOTE — SOCIAL WORK
SW met with pt to discuss PT/OT's recommendation for homecare services and for a SPC  Pt agreeable to referral for SN/PT/OT and after reviewing provider list was agreeable to referral to [address] [place] [address]  Referral send to [address] [place] [address]  and also to [facility] to confirm OOP cost of SPC  SW also provided [employer] application as requested  Pt to take to PCP for signature regarding disability  When medically cleared for discharge pt requests assistance with transportation home  Anticipated discharge date at this time is TBD  SW will continue to follow for plan of care

## 2018-08-14 NOTE — PLAN OF CARE
Problem: OCCUPATIONAL THERAPY ADULT  Goal: Performs self-care activities at highest level of function for planned discharge setting  See evaluation for individualized goals  Treatment Interventions: ADL retraining, Functional transfer training, UE strengthening/ROM, Patient/family training, Equipment evaluation/education, Activityengagement          See flowsheet documentation for full assessment, interventions and recommendations  Outcome: Progressing  Limitation: Decreased ADL status, Decreased endurance, Decreased self-care trans, Decreased high-level ADLs  Prognosis: Good  Assessment: Patient participated in Skilled OT session this date with interventions consisting of ADL re training with the use of correct body mechnaics, safety awareness and fall prevention techniques,  therapeutic activities to: increase activity tolerance and increase OOB/ sitting tolerance   Patient agreeable to OT treatment session, upon arrival patient was found seated at edge of bed  In comparison to previous session, patient with improvements in performing ADL's, stand suzette  Patient requiring ocassional safety reminders  Patient continues to be functioning below baseline level, occupational performance remains limited secondary to factors listed above and increased risk for falls and injury  From OT standpoint, recommendation at time of d/c would be Home OT w/assist from Niece  Patient to benefit from continued Occupational Therapy treatment while in the hospital to address deficits as defined above and maximize level of functional independence with ADLs and functional mobility        OT Discharge Recommendation:  (Home OT and family support)  OT - OK to Discharge:  ( when medically cleared)      Comments: Winsome Michael, [address]

## 2018-08-14 NOTE — ASSESSMENT & PLAN NOTE
Patient still has episodes of elevated blood pressures into the 180s and 190s  Will continue the Norvasc and the metoprolol and add HCTZ 12 5 mg daily and observe for now

## 2018-08-14 NOTE — OCCUPATIONAL THERAPY NOTE
Occupational Therapy Treatment Note    Name:  Kristin Mackay   MRN:   [id number]  Age:     45 y o  Patient Active Problem List   Diagnosis    Hyponatremia    [facility] failure ([facility] )    Hypertension    Generalized weakness    Type 2 diabetes mellitus with hyperglycemia, without long-term current use of insulin ([facility] )    Ambulatory dysfunction    Acute pancreatitis    Accelerated hypertension    UTI (urinary tract infection)    Other hyperlipidemia     Dizziness [R42]  Pancreatitis [K85 90]  Acetonemia [R79 89]  UTI (urinary tract infection) [N39 0]  Anemia [D64 9]  Hyperglycemia [R73 9]  Generalized weakness [R53 1]  Unspecified multiple injuries, initial encounter [T07  XXXA]      Subjective/Goals: " I get fuzzy because of my vision"    Vitals: following ~30 min of tx 189/103 seated, standing 179/85  RAO2 100%     OT total treatment time: 930-1025    Additional goals & Comments: Pt pleasant and cooperative wanting requested BR for ADL,pt edu purpose of tx  Pt performed bathing and dressing in BR prior to 1st BP  Pt encouraged to take seated rest bk  Pt then performed grooming and changed socks  FRENCH switched out Dynamap /90 seated HR 76  Standing 149/68 HR 75  Pt performed bed making w/ MI and requested return to bed following tx  All needs in reach  08/14/18 1025   Restrictions/Precautions   Weight Bearing Precautions Per Order No   Other Precautions Fall Risk;Visual impairment; Impulsive   Lifestyle   Reciprocal Relationships Niece   Intrinsic Gratification (Sports)   Pain Assessment   Pain Assessment No/denies pain   ADL   Where Assessed Standing at sink   Grooming Assistance 6  Modified Independent   Grooming Deficit Shaving; Wash/dry face; Teeth care   UB Bathing Assistance 5  Supervision/Setup   LB Bathing Assistance 5  Supervision/Setup   LB Bathing Deficit (edu support self to increase safety)   UB Dressing Assistance 4  Minimal Assistance   UB Dressing Deficit (w/ IV gown)   LB Dressing Assistance 6  Modified independent   LB Dressing Comments (edu sit doff/don pants to increase safety, wear smaller sock)   Toileting Assistance  (MI to urinate in standing supported on safety handles)   Functional Standing Tolerance   Time (10 min)   Activity (ADL)   Bed Mobility   Sit to Supine 7  Independent   Additional items (pt donned compression boots w/ S and edu)   Transfers   Sit to Stand 7  Independent   Stand to Sit 7  Independent   Stand pivot 6  Modified independent   Additional items (pt managed IV pole)   Functional Mobility   Functional Mobility 5  Supervision   Additional Comments (w/ occassional assist w/ IV pole)   Activity Tolerance   Activity Tolerance Patient tolerated treatment well   Assessment   Assessment Patient participated in Skilled OT session this date with interventions consisting of ADL re training with the use of correct body mechnaics, safety awareness and fall prevention techniques,  therapeutic activities to: increase activity tolerance and increase OOB/ sitting tolerance   Patient agreeable to OT treatment session, upon arrival patient was found seated at edge of bed  In comparison to previous session, patient with improvements in performing ADL's, stand suzette  Patient requiring ocassional safety reminders  Patient continues to be functioning below baseline level, occupational performance remains limited secondary to factors listed above and increased risk for falls and injury  From OT standpoint, recommendation at time of d/c would be Home OT w/assist from Niece  Patient to benefit from continued Occupational Therapy treatment while in the hospital to address deficits as defined above and maximize level of functional independence with ADLs and functional mobility  Plan   Treatment Interventions ADL retraining;Functional transfer training; Activityengagement; Energy conservation   Goal Expiration Date 08/20/18   Treatment Day (2)   OT Frequency 1-2x/wk Recommendation   OT Discharge Recommendation (Home OT and family support)   OT - OK to Discharge ( when medically cleared)     Azra Seaman, [address]

## 2018-08-14 NOTE — PROGRESS NOTES
Patient Name: Unknown Ollie  Patient MRN: [id number]  Date: 08/14/18  Service: [facility]    Subjective   Feeling better overall  Inadvertantly received a lo fat diet yesterday and had no ill-effects  Denies exacerbation of pancreatitis with meals, nausea or vomiting  No diarrhea  Vitals  Blood pressure 161/79, pulse 86, temperature (!) 96 7 °F (35 9 °C), temperature source Temporal, resp  rate 18, height 5' 6 5" (1 689 m), weight 51 kg (112 lb 7 oz), SpO2 100 %  Physical Exam:     General Appearance:    Awake, alert, oriented x3, no distress, well developed, well    nourished   Head:    Normocephalic without obvious abnormality   Eyes:    PERRL, conjunctiva/corneas clear, EOM's intact        Neck:   Supple, no adenopathy   Throat:   Mucous membranes moist   Lungs:     Clear to auscultation bilaterally, no wheezing or rhonchi   Heart:    Regular rate and rhythm, S1 and S2 normal, no murmur   Abdomen:     Soft, non-tender, non-distended  bowel sounds active  No    masses, rebound or guarding  Extremities:   Extremities normal  No clubbing, cyanosis or edema   Psych  Derm:   Normal affect    No jaundice   Neurologic:   CNII-XII grossly intact   Speech intact         Laboratory Studies    Results from last 7 days  Lab Units 08/11/18  0514 08/11/18  0016 08/10/18  1913   WBC Thousand/uL 6 10  --  6 70   HEMOGLOBIN g/dL 10 5*  --  12 3*   HEMATOCRIT % 30 5*  --  36 5*   PLATELETS Thousands/uL 168 220 201   INR   --  0 87*  --        Results from last 7 days  Lab Units 08/14/18  0519 08/12/18  0508 08/11/18  0514 08/10/18  1913   SODIUM mmol/L 140 136* 137 135*   POTASSIUM mmol/L 3 6 3 4* 3 4* 3 9   CHLORIDE mmol/L 101 104 106 100   CO2 mmol/L 32* 27 29 28   BUN mg/dL 8 6 10 14   CREATININE mg/dL 0 69* 0 56* 0 59* 0 61*   CALCIUM mg/dL 8 6 8 3* 8 4 9 5   TOTAL PROTEIN g/dL  --   --  6 8 8 0   BILIRUBIN TOTAL mg/dL  --   --  0 60 1 00   ALK PHOS U/L  --   --  81 110   ALT U/L  --   --  42 51   AST U/L  --   --  39 46   GLUCOSE RANDOM mg/dL 134* 119* 104* 276*     Lab Results   Component Value Date    LIPASE 1,153 (H) 08/14/2018    LIPASE 4,811 (H) 08/11/2018    LIPASE 3,534 (H) 08/10/2018       Imaging and Other Studies      Inhouse Medications     Current Facility-Administered Medications:     acetaminophen (TYLENOL) tablet 650 mg, 650 mg, Oral, Q6H PRN, 650 mg at 08/11/18 0006    amLODIPine (NORVASC) tablet 10 mg, 10 mg, Oral, Daily, 10 mg at 08/13/18 0911    citalopram (CeleXA) tablet 20 mg, 20 mg, Oral, Daily, 20 mg at 08/13/18 0911    docusate sodium (COLACE) capsule 100 mg, 100 mg, Oral, BID, 100 mg at 08/12/18 1704    enoxaparin (LOVENOX) subcutaneous injection 40 mg, 40 mg, Subcutaneous, Daily, 40 mg at 08/13/18 0911    gabapentin (NEURONTIN) capsule 100 mg, 100 mg, Oral, BID, 100 mg at 08/13/18 1818    insulin glargine (LANTUS) subcutaneous injection 10 Units 0 1 mL, 10 Units, Subcutaneous, QAM, 10 Units at 08/13/18 1255    insulin lispro (HumaLOG) 100 units/mL subcutaneous injection 1-5 Units, 1-5 Units, Subcutaneous, Q6H [facility], 5 Units at 08/13/18 2332 **AND** Fingerstick Glucose (POCT), , , Q6H    metoprolol tartrate (LOPRESSOR) tablet 50 mg, 50 mg, Oral, Q12H SHERRY, 50 mg at 08/13/18 2104    mirtazapine (REMERON) tablet 15 mg, 15 mg, Oral, HS, 15 mg at 08/13/18 2104    nystatin-triamcinolone (MYCOLOG-II) cream, , Topical, BID    ondansetron (ZOFRAN) injection 4 mg, 4 mg, Intravenous, Once    ondansetron (ZOFRAN) injection 4 mg, 4 mg, Intravenous, Q4H PRN    pantoprazole (PROTONIX) EC tablet 40 mg, 40 mg, Oral, BID AC, 40 mg at 08/14/18 0600    polyethylene glycol (MIRALAX) packet 17 g, 17 g, Oral, Daily PRN    senna (SENOKOT) tablet 8 6 mg, 1 tablet, Oral, Daily, 8 6 mg at 08/12/18 0909    Insert peripheral IV, , , Once **AND** sodium chloride (PF) 0 9 % injection 3 mL, 3 mL, Intravenous, PRN    sodium chloride 0 9 % infusion, 50 mL/hr, Intravenous, Continuous, 50 mL/hr at 08/14/18 8021      Assessment/Plan:  1  Mild recurrent acute pancreatitis, unclear etiology, slowly improving  Suspect related to atorvastatin  DDx:Hereditary vs viral vs much less likely intermittent porphyria  Continue low fat diet  Okay for discharge from gi standpoint  No need to follow lipase levels  2  Acute normocytic anemia, normal on admission  Suspect dilutional    3  GERD  Last EGD 9/2016 showing severe ulcerative esophagitis, candidiasis, mild gastritis/duodenitis  Treated with Diflucan  Currently on Protonix  PO BID       Concepcion Marie PA-C

## 2018-08-14 NOTE — NURSING NOTE
AOX3 flat affect HR regular Lungs decreased clear + Bowel Sounds x4 + PP ABD soft  Denies any pain  R elbow with dry scab and  R knee with 2 dry scab area open to air  Will continue to monitor call bell within reach

## 2018-08-14 NOTE — ASSESSMENT & PLAN NOTE
Lab Results   Component Value Date    HGBA1C 12 8 (H) 08/11/2018   Hemoglobin A1c is 12 8  Patient on a full liquid diet  Patient was counseled about compliance  Continue with Accu-Cheks, insulin sliding scale for coverage  Continue with metformin  Increase Lantus to 10 U twice daily  will need it as unable to use a lot of oral dm medications due to acute pancreatitis bout        (P) [phone]

## 2018-08-14 NOTE — NURSING NOTE
Fingerstick glucose reported as 63 with re check value of 62 at 1615  Pt awake alert, skin dry, feels shaky  Given juice and peanutbutter toast  Re check at this time 130  Pt feeling better  No

## 2018-08-15 ENCOUNTER — APPOINTMENT (INPATIENT)
Dept: MRI IMAGING | Facility: HOSPITAL | Age: 53
DRG: 282 | End: 2018-08-15
Payer: COMMERCIAL

## 2018-08-15 PROBLEM — E11.40 NEUROPATHY DUE TO TYPE 2 DIABETES MELLITUS (HCC): Status: ACTIVE | Noted: 2018-08-10

## 2018-08-15 LAB
GLUCOSE SERPL-MCNC: 270 MG/DL (ref 70–99)
GLUCOSE SERPL-MCNC: 321 MG/DL (ref 70–99)
GLUCOSE SERPL-MCNC: 62 MG/DL (ref 70–99)
GLUCOSE SERPL-MCNC: 68 MG/DL (ref 70–99)
GLUCOSE SERPL-MCNC: 68 MG/DL (ref 70–99)
GLUCOSE SERPL-MCNC: 89 MG/DL (ref 70–99)
LIPASE SERPL-CCNC: 890 U/L (ref 23–300)
VIT B12 SERPL-MCNC: 1527 PG/ML (ref 100–900)

## 2018-08-15 PROCEDURE — 83690 ASSAY OF LIPASE: CPT | Performed by: FAMILY MEDICINE

## 2018-08-15 PROCEDURE — 82948 REAGENT STRIP/BLOOD GLUCOSE: CPT

## 2018-08-15 PROCEDURE — 97116 GAIT TRAINING THERAPY: CPT

## 2018-08-15 PROCEDURE — 97530 THERAPEUTIC ACTIVITIES: CPT

## 2018-08-15 PROCEDURE — 99232 SBSQ HOSP IP/OBS MODERATE 35: CPT | Performed by: FAMILY MEDICINE

## 2018-08-15 PROCEDURE — 70551 MRI BRAIN STEM W/O DYE: CPT

## 2018-08-15 PROCEDURE — 82607 VITAMIN B-12: CPT | Performed by: FAMILY MEDICINE

## 2018-08-15 PROCEDURE — 97110 THERAPEUTIC EXERCISES: CPT

## 2018-08-15 PROCEDURE — 97535 SELF CARE MNGMENT TRAINING: CPT

## 2018-08-15 RX ORDER — INSULIN GLARGINE 100 [IU]/ML
14 INJECTION, SOLUTION SUBCUTANEOUS EVERY MORNING
Status: DISCONTINUED | OUTPATIENT
Start: 2018-08-16 | End: 2018-08-15

## 2018-08-15 RX ORDER — INSULIN GLARGINE 100 [IU]/ML
8 INJECTION, SOLUTION SUBCUTANEOUS EVERY MORNING
Status: DISCONTINUED | OUTPATIENT
Start: 2018-08-16 | End: 2018-08-16 | Stop reason: HOSPADM

## 2018-08-15 RX ORDER — GABAPENTIN 100 MG/1
200 CAPSULE ORAL 2 TIMES DAILY
Status: DISCONTINUED | OUTPATIENT
Start: 2018-08-15 | End: 2018-08-16 | Stop reason: HOSPADM

## 2018-08-15 RX ADMIN — PANTOPRAZOLE SODIUM 40 MG: 40 TABLET, DELAYED RELEASE ORAL at 17:15

## 2018-08-15 RX ADMIN — CITALOPRAM HYDROBROMIDE 20 MG: 20 TABLET ORAL at 08:40

## 2018-08-15 RX ADMIN — PANTOPRAZOLE SODIUM 40 MG: 40 TABLET, DELAYED RELEASE ORAL at 06:26

## 2018-08-15 RX ADMIN — ENOXAPARIN SODIUM 40 MG: 40 INJECTION SUBCUTANEOUS at 08:41

## 2018-08-15 RX ADMIN — GABAPENTIN 100 MG: 100 CAPSULE ORAL at 08:39

## 2018-08-15 RX ADMIN — GABAPENTIN 200 MG: 100 CAPSULE ORAL at 17:15

## 2018-08-15 RX ADMIN — METOPROLOL TARTRATE 50 MG: 50 TABLET ORAL at 08:39

## 2018-08-15 RX ADMIN — NYSTATIN AND TRIAMCINOLONE ACETONIDE: 100000; 1 CREAM TOPICAL at 17:19

## 2018-08-15 RX ADMIN — NYSTATIN AND TRIAMCINOLONE ACETONIDE: 100000; 1 CREAM TOPICAL at 08:47

## 2018-08-15 RX ADMIN — MIRTAZAPINE 15 MG: 15 TABLET, FILM COATED ORAL at 21:12

## 2018-08-15 RX ADMIN — AMLODIPINE BESYLATE 10 MG: 10 TABLET ORAL at 08:39

## 2018-08-15 RX ADMIN — HYDROCHLOROTHIAZIDE 12.5 MG: 12.5 TABLET ORAL at 08:46

## 2018-08-15 RX ADMIN — METOPROLOL TARTRATE 50 MG: 50 TABLET ORAL at 21:13

## 2018-08-15 NOTE — NURSING NOTE
Pt lying in bed  No c/o pain or discomfort at this time  Bp 201/90 auto and 190/110 manually  Pt scheduled metoprolol given  BP to be rechecked  IV dry and intact  Other VSS  Call bell within reach, will continue to monitor

## 2018-08-15 NOTE — ASSESSMENT & PLAN NOTE
Multifactorial secondary to poorly-controlled diabetes for years  Increase Neurontin to 200 mg oral twice daily  Advised the patient to follow up outpatient with his family doctor for further diagnosis and treatment of his neuropathy  Will set up outpatient physical therapy  Check a vitamin B12 level and lyme  also do mri brain to r/o any demyelinating etiology for neuropathy as he complains of numbness of entire left side of body

## 2018-08-15 NOTE — OCCUPATIONAL THERAPY NOTE
Occupational Therapy Treatment Note    Name:  Lori Pretty   MRN:   [id number]  Age:     45 y o  Patient Active Problem List   Diagnosis    Hyponatremia    Acute renal failure ([facility] [address] )    Hypertension    Generalized weakness    Type 2 diabetes mellitus with hyperglycemia, without long-term current use of insulin ([facility] )    Ambulatory dysfunction    Acute pancreatitis    Accelerated hypertension    UTI (urinary tract infection)    Other hyperlipidemia     Dizziness [R42]  Pancreatitis [K85 90]  Acetonemia [R79 89]  UTI (urinary tract infection) [N39 0]  Anemia [D64 9]  Hyperglycemia [R73 9]  Generalized weakness [R53 1]  Unspecified multiple injuries, initial encounter [T07  XXXA]      Subjective/Goals: Pt c/o numbness and tingling L abdominal area and LLE    Vitals:181/90 HR 82    OT total treatment time:    Additional goals & Comments: Pt fatigued this day, c/o pulling and fatigue when attempted 2 additonal planes w/ 2# dumbbells ( chest press and shoulder circles) performed 8 ea only then discontinued  Pt re-edu avoid bending utilize tailor sit method to doff/ don shoes  Pt edu utilize call bell to increase safety  All needs in reach end of tx  Pt seated EOB        08/15/18 1040   Restrictions/Precautions   Weight Bearing Precautions Per Order No   Other Precautions Fall Risk;Visual impairment; Impulsive   Lifestyle   Reciprocal Relationships Niece   Intrinsic Gratification Sports   Pain Assessment   Pain Assessment 0-10   Pain Score No Pain   ADL   LB Dressing Comments (S to don and tie shoes, pt became lt headed, edu tailor sit )   Functional Standing Tolerance   Time 5min   Activity Func mob   Comments w/ SPC   Transfers   Sit to Stand 7  Independent   Stand to Sit 7  Independent   Stand pivot 4  Minimal assistance   Additional items [facility])   Additional Comments (pt c/o LLE "numbness" this day and knees buckling in BR)   Functional Mobility   Functional Mobility (Min A to increase safety 2nd c/o buckling earlier)   Additional Comments (short distance w/ SPC no buckling observed)   Therapeutic Exercise - ROM   UE-ROM (BUE AROM w/2# dumbells 15 x 2,  2 planes to increase act suzette)   Cognition   Overall Cognitive Status WFL   Arousal/Participation Cooperative   Attention Within functional limits   Orientation Level Oriented to person;Oriented to place;Oriented to situation   Score (stated it was July and did know date or day of week)   Activity Tolerance   Activity Tolerance Patient limited by fatigue   Assessment   Assessment Patient participated in Skilled OT session this date with interventions consisting of ADL re training with the use of correct body mechnaics, safety awareness and fall prevention techniques,  therapeutic activities to: increase activity tolerance, increase dynamic sit/ stand balance during functional activity  and increase OOB/ sitting tolerance   Patient agreeable to OT treatment session, upon arrival patient was found supine in bed  In comparison to previous session, patient more fatigued less alert this day   Patient requiring frequent rest periods and ocassional safety reminders  Patient continues to be functioning below baseline level, occupational performance remains limited secondary to factors listed above and increased risk for falls and injury  From OT standpoint, recommendation at time of d/c would be Home OT w/ family support pending progress  Patient to benefit from continued Occupational Therapy treatment while in the hospital to address deficits as defined above and maximize level of functional independence with ADLs and functional mobility      Plan   Goal Expiration Date 08/20/18   OT Frequency 1-2x/wk   Recommendation   OT Discharge Recommendation (Home OT and family supp pending progress)   OT - OK to Discharge (when medically cleared)   Souleymane Reyes, [address]

## 2018-08-15 NOTE — ASSESSMENT & PLAN NOTE
With frequent falls (at least 2 falls over the past week) without head injury most likely secondary to diabetic polyneuropathy in the setting of poorly-controlled diabetes  Fall precautions  Reviewed Physical therapy note    Patient is recommended for outpatient physical therapy

## 2018-08-15 NOTE — SOCIAL WORK
Per [address]  , [facility] covered with no OOP cost  Item delivered to pt and copy of delivery ticket sent to [facility]  via [facility] copy provided to pt  Pt requested SW contact his niece Saray Lerma at [phone] to update on plan for d/c home w/ [facility]  SW attempted to contact that number; however, it was not in service  SW to f/u with pt regarding and continue to follow for discharge needs

## 2018-08-15 NOTE — PLAN OF CARE
Problem: PHYSICAL THERAPY ADULT  Goal: Performs mobility at highest level of function for planned discharge setting  See evaluation for individualized goals  Treatment/Interventions: ADL retraining, Functional transfer training, LE strengthening/ROM, Elevations, Therapeutic exercise, Endurance training, Patient/family training, Cognitive reorientation, Equipment eval/education, Bed mobility, Gait training, Spoke to nursing, OT  Equipment Recommended: Other (Comment) (To be determined)       See flowsheet documentation for full assessment, interventions and recommendations  Outcome: Adequate for Discharge  Prognosis: Good  Problem List: Impaired judgement, Impaired balance  Assessment: Pt amb with cane and short distances with no AD with no LOB but report of feeling unsteady  Occ stops as if to regroup then cont again  /84 after activity - no report of dizziness  states that he feel tired and that he is " just not right" performed ramp with no problems- no LOB with balance challenges today  Barriers to Discharge: None     Recommendation: Outpatient PT     PT - OK to Discharge: Yes    See flowsheet documentation for full assessment

## 2018-08-15 NOTE — PHYSICAL THERAPY NOTE
Session 25' ( [id number])     08/15/18 1140   Pain Assessment   Pain Score (reports no pain, +-tingling in L thigh)   General   Chart Reviewed Yes   Family/Caregiver Present No   Cognition   Overall Cognitive Status WFL   Arousal/Participation Alert   Attention Within functional limits   Orientation Level Oriented X4   Memory Within functional limits   Following Commands Follows all commands and directions without difficulty   Comments pt very focused on negative reports and worries   Subjective   Subjective i can't live like this - I am just not right   Bed Mobility   Supine to Sit 7  Independent   Transfers   Sit to Stand 7  Independent   Stand to Sit 7  Independent   Stand pivot 7  Independent   Ambulation/Elevation   Gait pattern (occ upper body sway - equal step length- + heel strike)   Gait Assistance 6  Modified independent   Assistive Device (with cane and no AD ( in room))   Distance 220' and over 300' -on/off elevator   Ramp Assistance 6  Modified independent   Additional items (with cane)   Balance   Dynamic Standing Fair +   Ambulatory Good   Activity Tolerance   Activity Tolerance Patient tolerated treatment well   Exercises   Hip Flexion Standing  (marching 10 x 2 with no UE support and S )   Ankle Pumps Standing  (heel raises 10 x 2 , no UE support and  S)   Balance training  repeated sit <-  stand with no UE use 10 x 2 and S - head turns retro amb , 360* turns -all without LOB   Assessment   Prognosis Good   Assessment Pt amb with cane and short distances with no AD with no LOB but report of feeling unsteady  Occ stops as if to regroup then cont again  /84 after activity - no report of dizziness   states that he feel tired and that he is " just not right" performed ramp with no problems- no LOB with balance challenges today   Barriers to Discharge None   Goals   Patient Goals to do the same as yesterday   LTG Expiration Date 08/23/18   Treatment Day 2   Plan   Treatment/Interventions (cont as per POC)   Recommendation   Recommendation Outpatient PT   Equipment Recommended (cane was issued)   PT - OK to Discharge Yes   Additional Comments (when medically cleared)

## 2018-08-15 NOTE — ASSESSMENT & PLAN NOTE
Unclear etiology  Patient has pancreatitis maybe related to medication use,viral infection or idiopathic  Clinically improving  However still periodically complains of some left lower chest pain  Will repeat a lipase level to check as he is not a reliable historian

## 2018-08-15 NOTE — PROGRESS NOTES
Progress Note - Lexie Aleman 1965, 45 y o  male MRN: [id number]    Unit/Bed#: 7T [facility] 704-02 Encounter: [id number]    Primary Care Provider: Ran Fraire DO   Date and time admitted to hospital: 8/10/2018  6:06 PM        Acute pancreatitis   Assessment & Plan    Unclear etiology  Patient has pancreatitis maybe related to medication use,viral infection or idiopathic  Clinically improving  However still periodically complains of some left lower chest pain  Will repeat a lipase level to check as he is not a reliable historian  Accelerated hypertension   Assessment & Plan    Blood pressure better controlled today on Norvasc, HCTZ and metoprolol           Other hyperlipidemia   Assessment & Plan    Not taking any statins at home  lipid panel acceptable  Ambulatory dysfunction   Assessment & Plan    With frequent falls (at least 2 falls over the past week) without head injury most likely secondary to diabetic polyneuropathy in the setting of poorly-controlled diabetes  Fall precautions  Reviewed Physical therapy note  Patient is recommended for outpatient physical therapy        Type 2 diabetes mellitus with hyperglycemia, without long-term current use of insulin (HCC)   Assessment & Plan    Lab Results   Component Value Date    HGBA1C 12 8 (H) 08/11/2018   Hemoglobin A1c is 12 8  Patient on a diabetic diet  Patient was counseled about compliance  Continue with Accu-Cheks, insulin sliding scale for coverage  Continue with metformin  Patient had some episodes of low blood sugars secondary to insulin  Will change Lantus to 8units once daily in the morning  And observe      (P) [phone]        Hyponatremia   Assessment & Plan    Secondary to hyperglycemia  Now resolved  sodium is 140        * 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 2 diabetes mellitus [facility])   Assessment & Plan    Multifactorial secondary to poorly-controlled diabetes for years  Increase Neurontin to 200 mg oral twice daily  Advised the patient to follow up outpatient with his family doctor for further diagnosis and treatment of his neuropathy  Will set up outpatient physical therapy  Check a vitamin B12 level and lyme  also do mri brain to r/o any demyelinating etiology for neuropathy as he complains of numbness of entire left side of body          VTE Pharmacologic Prophylaxis:   Pharmacologic: Enoxaparin (Lovenox)  Mechanical VTE Prophylaxis in Place: Yes    Patient Centered Rounds: I have performed bedside rounds with nursing staff today  Discussions with Specialists or Other Care Team Provider: gi    Education and Discussions with Family / Patient: discussed with the patient about his hospital course in detail    Time Spent for Care: 30 minutes  More than 50% of total time spent on counseling and coordination of care as described above  Current Length of Stay: 5 day(s)    Current Patient Status: Inpatient   Certification Statement: The patient will continue to require additional inpatient hospital stay due to labile blood sugar    Discharge Plan: tomorrow    Code Status: Level 1 - Full Code      Subjective:   Complains of pain on entire left side of body with numbness and tingling  also complained of epigastric pain this morning after eating breakfast no nausea or vomiting  Objective:     Vitals:   Temp (24hrs), Av 8 °F (36 6 °C), Min:97 1 °F (36 2 °C), Max:98 6 °F (37 °C)    HR:  [74-93] 80  Resp:  [17-18] 18  BP: (130-202)/() 144/66  SpO2:  [98 %-100 %] 99 %  Body mass index is 17 88 kg/m²  Input and Output Summary (last 24 hours): Intake/Output Summary (Last 24 hours) at 08/15/18 1724  Last data filed at 08/15/18 1400   Gross per 24 hour   Intake              960 ml   Output                0 ml   Net              960 ml       Physical Exam:     Physical Exam   Constitutional: He is oriented to person, place, and time  He appears well-developed and well-nourished     HENT:   Head: Normocephalic and atraumatic  Right Ear: External ear normal    Left Ear: External ear normal    Mouth/Throat: Oropharynx is clear and moist    Eyes: Conjunctivae and EOM are normal  Pupils are equal, round, and reactive to light  Neck: Normal range of motion  Neck supple  Cardiovascular: Normal rate, regular rhythm, normal heart sounds and intact distal pulses  Pulmonary/Chest: Effort normal and breath sounds normal    Abdominal: Soft  Bowel sounds are normal  He exhibits no mass  There is no tenderness  There is no rebound and no guarding  Genitourinary:   Genitourinary Comments: deferred   Musculoskeletal:   Numbness and decreased sensation of left chest wall and left arm and left leg excluding left hand and foot  normal reflexes and normal propioception   Neurological: He is alert and oriented to person, place, and time  He has normal reflexes  Skin: Skin is warm and dry  No rash noted  Psychiatric: He has a normal mood and affect  Nursing note and vitals reviewed  Additional Data:     Labs:      Results from last 7 days  Lab Units 08/11/18  0514   WBC Thousand/uL 6 10   HEMOGLOBIN g/dL 10 5*   HEMATOCRIT % 30 5*   PLATELETS Thousands/uL 168   NEUTROS PCT % 74*   LYMPHS PCT % 18*   MONOS PCT % 7   EOS PCT % 1       Results from last 7 days  Lab Units 08/14/18  0519  08/11/18  0514   SODIUM mmol/L 140  < > 137   POTASSIUM mmol/L 3 6  < > 3 4*   CHLORIDE mmol/L 101  < > 106   CO2 mmol/L 32*  < > 29   BUN mg/dL 8  < > 10   CREATININE mg/dL 0 69*  < > 0 59*   CALCIUM mg/dL 8 6  < > 8 4   TOTAL PROTEIN g/dL  --   --  6 8   BILIRUBIN TOTAL mg/dL  --   --  0 60   ALK PHOS U/L  --   --  81   ALT U/L  --   --  42   AST U/L  --   --  39   GLUCOSE RANDOM mg/dL 134*  < > 104*   < > = values in this interval not displayed      Results from last 7 days  Lab Units 08/11/18  0016   INR  0 87*       Results from last 7 days  Lab Units 08/15/18  1644 08/15/18  1119 08/15/18  2570 08/15/18  5231 08/15/18  1268 08/14/18  2107 08/14/18  1640 08/14/18  1615 08/14/18  1132 08/14/18  0544 08/13/18  2327 08/13/18  1611   POC GLUCOSE mg/dl 321* 270* 89 68* 68* 312* 130* 62* 233* 132* 460* 278*       Results from last 7 days  Lab Units 08/11/18  0016   HEMOGLOBIN A1C % 12 8*         * I Have Reviewed All Lab Data Listed Above  * Additional Pertinent Lab Tests Reviewed: [address] Admission Reviewed    Imaging:    Imaging Reports Reviewed Today Include: none  Imaging Personally Reviewed by Myself Includes:  none    Recent Cultures (last 7 days):           Last 24 Hours Medication List:     Current Facility-Administered Medications:  acetaminophen 650 mg Oral Q6H PRN Chula Castillo MD   amLODIPine 10 mg Oral Daily [place] Pierce MD   citalopram 20 mg Oral Daily Chula Castillo MD   docusate sodium 100 mg Oral BID Chula Castillo MD   enoxaparin 40 mg Subcutaneous Daily Chula Castillo MD   gabapentin 200 mg Oral BID Missy Stevens MD   hydrochlorothiazide 12 5 mg Oral Daily Missy Stevens MD   [START ON 8/16/2018] insulin glargine 8 Units Subcutaneous QAM Missy Stevens MD   insulin lispro 1-5 Units Subcutaneous 4x Daily (AC & HS) Missy Stevens MD   metoprolol tartrate 50 mg Oral Q12H [address] Chula Castillo MD   mirtazapine 15 mg Oral HS Chula Castillo MD   nystatin-triamcinolone  Topical BID Missy tSevens MD   ondansetron 4 mg Intravenous Once Delta Lawson MD   ondansetron 4 mg Intravenous Q4H PRN Chula Castillo MD   pantoprazole 40 mg Oral BID AC Missy Stevens MD   polyethylene glycol 17 g Oral Daily PRN Chula Castillo MD   senna 1 tablet Oral Daily Chula Castillo MD   sodium chloride (PF) 3 mL Intravenous PRN Delta Lawson MD        Today, Patient Was Seen By: Missy Stevens MD    ** Please Note: Dictation voice to text software may have been used in the creation of this document   **

## 2018-08-15 NOTE — NURSING NOTE
Pt appears to be asleep  Easily aroused for VS  BP rechecked 165/77 HR 83  Pt has no c/o pain  Call bell within reach, will continue to monitor

## 2018-08-15 NOTE — ASSESSMENT & PLAN NOTE
Lab Results   Component Value Date    HGBA1C 12 8 (H) 08/11/2018   Hemoglobin A1c is 12 8  Patient on a diabetic diet  Patient was counseled about compliance  Continue with Accu-Cheks, insulin sliding scale for coverage  Continue with metformin  Patient had some episodes of low blood sugars secondary to insulin  Will change Lantus to 8units once daily in the morning    And observe      (P) [phone]

## 2018-08-15 NOTE — NURSING NOTE
Pt appears to be asleep  Easily aroused for VS  Pt offers no c/o at this time  Has no c/o abdominal pain or discomfort  Pt /64  VSS call bell within reach,will continue to monitor

## 2018-08-15 NOTE — PLAN OF CARE
Problem: OCCUPATIONAL THERAPY ADULT  Goal: Performs self-care activities at highest level of function for planned discharge setting  See evaluation for individualized goals  Treatment Interventions: ADL retraining, Functional transfer training, UE strengthening/ROM, Patient/family training, Equipment evaluation/education, Activityengagement          See flowsheet documentation for full assessment, interventions and recommendations  Outcome: Not Progressing  Limitation: Decreased ADL status, Decreased endurance, Decreased self-care trans, Decreased high-level ADLs  Prognosis: Good  Assessment: Patient participated in Skilled OT session this date with interventions consisting of ADL re training with the use of correct body mechnaics, safety awareness and fall prevention techniques,  therapeutic activities to: increase activity tolerance, increase dynamic sit/ stand balance during functional activity  and increase OOB/ sitting tolerance   Patient agreeable to OT treatment session, upon arrival patient was found supine in bed  In comparison to previous session, patient more fatigued less alert this day   Patient requiring frequent rest periods and ocassional safety reminders  Patient continues to be functioning below baseline level, occupational performance remains limited secondary to factors listed above and increased risk for falls and injury  From OT standpoint, recommendation at time of d/c would be Home OT w/ family support pending progress  Patient to benefit from continued Occupational Therapy treatment while in the hospital to address deficits as defined above and maximize level of functional independence with ADLs and functional mobility        OT Discharge Recommendation:  (Home OT and family supp pending progress)  OT - OK to Discharge:  (when medically cleared)      Comments: Daron Ross, [address]

## 2018-08-15 NOTE — PROGRESS NOTES
Patient Name: Kenji Garza  Patient MRN: [id number]  Date: 08/15/18  Service: [facility]    Subjective   Patient reported two loose stools yesterday late morning  None since  Enteric pathogen stool test ordered but no stool for sampling  Patient sleeping comfortably but still complains of 10/10 pain (?)  No nausea or vomiting  Appears to have eaten 100% breakfast      Vitals  Blood pressure 144/66, pulse 80, temperature 98 6 °F (37 °C), temperature source Temporal, resp  rate 18, height 5' 6 5" (1 689 m), weight 51 kg (112 lb 7 oz), SpO2 99 %  Physical Exam:     General Appearance:    Awake, alert, oriented x3, no distress, well developed, well    nourished   Head:    Normocephalic without obvious abnormality   Eyes:    PERRL, conjunctiva/corneas clear, EOM's intact        Neck:   Supple, no adenopathy   Throat:   Mucous membranes moist   Lungs:     Clear to auscultation bilaterally, no wheezing or rhonchi   Heart:    Regular rate and rhythm, S1 and S2 normal, no murmur   Abdomen:     Soft, non-tender with distraction, +ttp when aware of exam, non-distended  bowel sounds active  No    masses, rebound  +guarding   Extremities:   Extremities normal  No clubbing, cyanosis or edema   Psych  Derm:   Normal affect    No jaundice   Neurologic:   CNII-XII grossly intact   Speech intact         Laboratory Studies    Results from last 7 days  Lab Units 08/11/18  0514 08/11/18  0016 08/10/18  1913   WBC Thousand/uL 6 10  --  6 70   HEMOGLOBIN g/dL 10 5*  --  12 3*   HEMATOCRIT % 30 5*  --  36 5*   PLATELETS Thousands/uL 168 220 201   INR   --  0 87*  --        Results from last 7 days  Lab Units 08/14/18  0519 08/12/18  0508 08/11/18  0514 08/10/18  1913   SODIUM mmol/L 140 136* 137 135*   POTASSIUM mmol/L 3 6 3 4* 3 4* 3 9   CHLORIDE mmol/L 101 104 106 100   CO2 mmol/L 32* 27 29 28   BUN mg/dL 8 6 10 14   CREATININE mg/dL 0 69* 0 56* 0 59* 0 61*   CALCIUM mg/dL 8 6 8 3* 8 4 9 5   TOTAL PROTEIN g/dL  -- --  6 8 8 0   BILIRUBIN TOTAL mg/dL  --   --  0 60 1 00   ALK PHOS U/L  --   --  81 110   ALT U/L  --   --  42 51   AST U/L  --   --  39 46   GLUCOSE RANDOM mg/dL 134* 119* 104* 276*     Lab Results   Component Value Date    LIPASE 1,153 (H) 08/14/2018    LIPASE 4,811 (H) 08/11/2018    LIPASE 3,534 (H) 08/10/2018       Imaging and Other Studies      Inhouse Medications     Current Facility-Administered Medications:     acetaminophen (TYLENOL) tablet 650 mg, 650 mg, Oral, Q6H PRN, 650 mg at 08/11/18 0006    amLODIPine (NORVASC) tablet 10 mg, 10 mg, Oral, Daily, 10 mg at 08/15/18 0839    citalopram (CeleXA) tablet 20 mg, 20 mg, Oral, Daily, 20 mg at 08/15/18 0840    docusate sodium (COLACE) capsule 100 mg, 100 mg, Oral, BID, 100 mg at 08/12/18 1704    enoxaparin (LOVENOX) subcutaneous injection 40 mg, 40 mg, Subcutaneous, Daily, 40 mg at 08/15/18 0841    gabapentin (NEURONTIN) capsule 100 mg, 100 mg, Oral, BID, 100 mg at 08/15/18 0839    hydrochlorothiazide (HYDRODIURIL) tablet 12 5 mg, 12 5 mg, Oral, Daily, 12 5 mg at 08/15/18 0846    insulin glargine (LANTUS) subcutaneous injection 10 Units 0 1 mL, 10 Units, Subcutaneous, Q12H [address], 10 Units at 08/14/18 2115    insulin lispro (HumaLOG) 100 units/mL subcutaneous injection 1-5 Units, 1-5 Units, Subcutaneous, 4x Daily (AC & HS), 3 Units at 08/14/18 2115 **AND** Fingerstick Glucose (POCT), , , Q6H    metoprolol tartrate (LOPRESSOR) tablet 50 mg, 50 mg, Oral, Q12H SHERRY, 50 mg at 08/15/18 0839    mirtazapine (REMERON) tablet 15 mg, 15 mg, Oral, HS, 15 mg at 08/14/18 2115    nystatin-triamcinolone (MYCOLOG-II) cream, , Topical, BID    ondansetron (ZOFRAN) injection 4 mg, 4 mg, Intravenous, Once    ondansetron (ZOFRAN) injection 4 mg, 4 mg, Intravenous, Q4H PRN    pantoprazole (PROTONIX) EC tablet 40 mg, 40 mg, Oral, BID AC, 40 mg at 08/15/18 0626    polyethylene glycol (MIRALAX) packet 17 g, 17 g, Oral, Daily PRN    senna (SENOKOT) tablet 8 6 mg, 1 tablet, Oral, Daily, 8 6 mg at 08/12/18 0909    Insert peripheral IV, , , Once **AND** sodium chloride (PF) 0 9 % injection 3 mL, 3 mL, Intravenous, PRN      Assessment/Plan:  1  Mild recurrent acute pancreatitis, unclear etiology, slowly improving  Tolerating lo fat diet  Suspect related to atorvastatin  DDx:Hereditary vs viral  AP much less likely intermittent porphyria but suggestive by numbness/tingling  Continue low fat diet  Okay for discharge from gi standpoint  2  Acute normocytic anemia, normal on admission  Suspect dilutional  Recommend OP screening colonoscopy  3  GERD  Last EGD 9/2016 showing severe ulcerative esophagitis, candidiasis, mild gastritis/duodenitis  Treated with Diflucan  Currently on Protonix  PO BID  4  Limited diarrhea, resolved  Enteric pathogen stool test ord'd, but no stool for sampling  Doubt C difficile  No fevers       Deshaun Rowley PA-C

## 2018-08-16 VITALS
HEART RATE: 69 BPM | HEIGHT: 67 IN | RESPIRATION RATE: 16 BRPM | TEMPERATURE: 97.7 F | WEIGHT: 112.43 LBS | OXYGEN SATURATION: 96 % | BODY MASS INDEX: 17.65 KG/M2 | DIASTOLIC BLOOD PRESSURE: 55 MMHG | SYSTOLIC BLOOD PRESSURE: 102 MMHG

## 2018-08-16 LAB
GLUCOSE SERPL-MCNC: 230 MG/DL (ref 70–99)
GLUCOSE SERPL-MCNC: 343 MG/DL (ref 70–99)
GLUCOSE SERPL-MCNC: 348 MG/DL (ref 70–99)

## 2018-08-16 PROCEDURE — 82948 REAGENT STRIP/BLOOD GLUCOSE: CPT

## 2018-08-16 PROCEDURE — 86618 LYME DISEASE ANTIBODY: CPT | Performed by: FAMILY MEDICINE

## 2018-08-16 PROCEDURE — 99239 HOSP IP/OBS DSCHRG MGMT >30: CPT | Performed by: FAMILY MEDICINE

## 2018-08-16 RX ORDER — FAMOTIDINE 20 MG/1
20 TABLET, FILM COATED ORAL DAILY
Qty: 30 TABLET | Refills: 0 | Status: SHIPPED | OUTPATIENT
Start: 2018-08-16

## 2018-08-16 RX ORDER — CITALOPRAM 20 MG/1
20 TABLET ORAL DAILY
Qty: 30 TABLET | Refills: 0 | Status: ON HOLD | OUTPATIENT
Start: 2018-08-16 | End: 2020-01-27 | Stop reason: CLARIF

## 2018-08-16 RX ORDER — METOPROLOL TARTRATE 50 MG/1
50 TABLET, FILM COATED ORAL EVERY 12 HOURS SCHEDULED
Qty: 60 TABLET | Refills: 0 | Status: ON HOLD | OUTPATIENT
Start: 2018-08-16 | End: 2020-01-28 | Stop reason: CLARIF

## 2018-08-16 RX ORDER — INSULIN GLARGINE 100 [IU]/ML
4 INJECTION, SOLUTION SUBCUTANEOUS ONCE
Status: COMPLETED | OUTPATIENT
Start: 2018-08-16 | End: 2018-08-16

## 2018-08-16 RX ORDER — GABAPENTIN 100 MG/1
100 CAPSULE ORAL 2 TIMES DAILY
Qty: 60 CAPSULE | Refills: 0 | Status: SHIPPED | OUTPATIENT
Start: 2018-08-16

## 2018-08-16 RX ORDER — AMLODIPINE BESYLATE 10 MG/1
10 TABLET ORAL DAILY
Qty: 30 TABLET | Refills: 0 | Status: SHIPPED | OUTPATIENT
Start: 2018-08-17

## 2018-08-16 RX ORDER — HYDROCHLOROTHIAZIDE 12.5 MG/1
12.5 TABLET ORAL DAILY
Qty: 30 TABLET | Refills: 0 | Status: ON HOLD | OUTPATIENT
Start: 2018-08-17 | End: 2020-01-28 | Stop reason: CLARIF

## 2018-08-16 RX ORDER — MIRTAZAPINE 15 MG/1
15 TABLET, FILM COATED ORAL
Qty: 30 TABLET | Refills: 0 | Status: SHIPPED | OUTPATIENT
Start: 2018-08-16

## 2018-08-16 RX ADMIN — INSULIN GLARGINE 4 UNITS: 100 INJECTION, SOLUTION SUBCUTANEOUS at 11:43

## 2018-08-16 RX ADMIN — METFORMIN HYDROCHLORIDE 500 MG: 500 TABLET ORAL at 11:07

## 2018-08-16 RX ADMIN — INSULIN GLARGINE 8 UNITS: 100 INJECTION, SOLUTION SUBCUTANEOUS at 08:29

## 2018-08-16 RX ADMIN — NYSTATIN AND TRIAMCINOLONE ACETONIDE: 100000; 1 CREAM TOPICAL at 08:34

## 2018-08-16 RX ADMIN — PANTOPRAZOLE SODIUM 40 MG: 40 TABLET, DELAYED RELEASE ORAL at 06:27

## 2018-08-16 RX ADMIN — PANTOPRAZOLE SODIUM 40 MG: 40 TABLET, DELAYED RELEASE ORAL at 15:52

## 2018-08-16 RX ADMIN — ENOXAPARIN SODIUM 40 MG: 40 INJECTION SUBCUTANEOUS at 08:29

## 2018-08-16 RX ADMIN — CITALOPRAM HYDROBROMIDE 20 MG: 20 TABLET ORAL at 08:28

## 2018-08-16 RX ADMIN — GABAPENTIN 200 MG: 100 CAPSULE ORAL at 08:29

## 2018-08-16 NOTE — ASSESSMENT & PLAN NOTE
Multifactorial secondary to poorly-controlled diabetes for years  Increase Neurontin to 200 mg oral twice daily  Advised the patient to follow up outpatient with his family doctor for further diagnosis and treatment of his neuropathy  Will set up outpatient physical therapy  vitamin B12 level is normal and lyme is pending  MRI of the brain done and just show some cerebral atrophy

## 2018-08-16 NOTE — ASSESSMENT & PLAN NOTE
Lab Results   Component Value Date    HGBA1C 12 8 (H) 08/11/2018   Hemoglobin A1c is 12 8  Patient on a diabetic diet  Patient was counseled about compliance  Patient had some episodes of low blood sugars secondary to insulin  Will change Lantus to 8units once daily in the morning  Add metformin 500 mg 1 tablet daily    Cautious on use of oral hypoglycemic agents secondary to his acute pancreatitis and labile blood sugars      (P) [phone]

## 2018-08-16 NOTE — NURSING NOTE
AOX3 HR regular Lungs clear + Bowel Sounds x4 + PP ABD soft  Denies any pain  R knee and R elbow with old abrasion open to air dry intact  Will continue to monitor call bell within reach

## 2018-08-16 NOTE — PROGRESS NOTES
Patient Name: Kenisha Khanna  Patient MRN: [id number]  Date: 08/16/18  Service: [facility]    Subjective   Kenisha Khanna is a 45 y o  male who was admitted with 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 2 diabetes mellitus ([facility] [address] )  He continues to have abdominal pain  He states it is not as bad as yesterday  He appears comfortable in bed  His appetite is good  He does not experience any increased pain with eating  Objective     Vitals  Blood pressure 138/86, pulse 80, temperature (!) 96 9 °F (36 1 °C), temperature source Temporal, resp  rate 18, height 5' 6 5" (1 689 m), weight 51 kg (112 lb 7 oz), SpO2 100 %  General: Alert, no apparent distress  Eyes: No scleral icterus  ENT: MMM  Card: RRR no murmur  Lungs: Clear to ascultation b/l  No wheezes, rales, rhonchi  Abdomen: Soft  Mild upper abdominal tenderness  Nondistended  Bowel sounds present and normoactive    Skin: No jaundice  Neuro: Alert and oriented x3        Laboratory Studies  Lab Results   Component Value Date    CREATININE 0 69 (L) 08/14/2018    BUN 8 08/14/2018    K 3 6 08/14/2018     08/14/2018    CO2 32 (H) 08/14/2018    GLUCOSE 134 (H) 08/14/2018    CALCIUM 8 6 08/14/2018    PROT 6 8 08/11/2018    ALKPHOS 81 08/11/2018    BILITOT 0 60 08/11/2018    AST 39 08/11/2018    ALT 42 08/11/2018     Lab Results   Component Value Date    WBC 6 10 08/11/2018    HGB 10 5 (L) 08/11/2018    HCT 30 5 (L) 08/11/2018     08/11/2018    MCV 96 08/11/2018     Lab Results   Component Value Date    PROTIME 9 3 (L) 08/11/2018    INR 0 87 (L) 08/11/2018       Imaging and Other Studies    Inhouse Medications       Current Facility-Administered Medications:     acetaminophen (TYLENOL) tablet 650 mg, 650 mg, Oral, Q6H PRN, 650 mg at 08/11/18 0006    amLODIPine (NORVASC) tablet 10 mg, 10 mg, Oral, Daily, 10 mg at 08/15/18 0839    citalopram (CeleXA) tablet 20 mg, 20 mg, Oral, Daily, 20 mg at 08/15/18 0840    docusate sodium (COLACE) capsule 100 mg, 100 mg, Oral, BID, 100 mg at 08/12/18 1704    enoxaparin (LOVENOX) subcutaneous injection 40 mg, 40 mg, Subcutaneous, Daily, 40 mg at 08/15/18 0841    gabapentin (NEURONTIN) capsule 200 mg, 200 mg, Oral, BID, 200 mg at 08/15/18 1715    hydrochlorothiazide (HYDRODIURIL) tablet 12 5 mg, 12 5 mg, Oral, Daily, 12 5 mg at 08/15/18 0846    insulin glargine (LANTUS) subcutaneous injection 8 Units 0 08 mL, 8 Units, Subcutaneous, QAM    insulin lispro (HumaLOG) 100 units/mL subcutaneous injection 1-5 Units, 1-5 Units, Subcutaneous, 4x Daily (AC & HS), 3 Units at 08/16/18 0624 **AND** Fingerstick Glucose (POCT), , , Q6H    metoprolol tartrate (LOPRESSOR) tablet 50 mg, 50 mg, Oral, Q12H SHERRY, 50 mg at 08/15/18 2113    mirtazapine (REMERON) tablet 15 mg, 15 mg, Oral, HS, 15 mg at 08/15/18 2112    nystatin-triamcinolone (MYCOLOG-II) cream, , Topical, BID    ondansetron (ZOFRAN) injection 4 mg, 4 mg, Intravenous, Once    ondansetron (ZOFRAN) injection 4 mg, 4 mg, Intravenous, Q4H PRN    pantoprazole (PROTONIX) EC tablet 40 mg, 40 mg, Oral, BID AC, 40 mg at 08/16/18 0627    polyethylene glycol (MIRALAX) packet 17 g, 17 g, Oral, Daily PRN    senna (SENOKOT) tablet 8 6 mg, 1 tablet, Oral, Daily, 8 6 mg at 08/12/18 0909    Insert peripheral IV, , , Once **AND** sodium chloride (PF) 0 9 % injection 3 mL, 3 mL, Intravenous, PRN      Assessment/Plan:  1  Mild recurrent acute pancreatitis of unclear etiology  Patient is slowly improving and he is tolerating his diet without increased abdominal pain  Suspect may be secondary to his atorvastatin  Lipase remains elevated  Will recheck  2  Acute normocytic anemia  Suspect delusional recommend outpatient screening colonoscopy  3  GERD, last EGD 09/2016 with severe ulcerative esophagitis-candidiasis and mild gastritis duodenitis  Patient was treated with Diflucan    Continue Protonix 40 mg b i d     SHAKA Cain

## 2018-08-16 NOTE — DISCHARGE INSTR - APPOINTMENTS
Liliane Sandovalast application sent in by  at hospital- you will receive a phone call or letter stating if eligible for continued services      Take application for reduced transportation fare to the [facility] office    Take housing form to your apartment office

## 2018-08-16 NOTE — DISCHARGE SUMMARY
Discharge- Mercedes Powell 1965, 45 y o  male MRN: [id number]    Unit/Bed#: 7T [facility] 704-02 Encounter: [id number]    Primary Care Provider: Lexie Salazar DO   Date and time admitted to hospital: 8/10/2018  6:06 PM        Acute pancreatitis   Assessment & Plan    Unclear etiology  Patient has pancreatitis maybe related to medication use,viral infection or idiopathic  Clinically improving  Denies any epigastric pain today          Accelerated hypertension   Assessment & Plan    Blood pressure well controlled today on Norvasc, HCTZ and metoprolol           Other hyperlipidemia   Assessment & Plan    Not taking any statins at home  lipid panel acceptable  Ambulatory dysfunction   Assessment & Plan    With frequent falls (at least 2 falls over the past week) without head injury most likely secondary to diabetic polyneuropathy in the setting of poorly-controlled diabetes  Fall precautions  Reviewed Physical therapy note  Patient is recommended for outpatient physical therapy        Type 2 diabetes mellitus with hyperglycemia, without long-term current use of insulin (HCC)   Assessment & Plan    Lab Results   Component Value Date    HGBA1C 12 8 (H) 08/11/2018   Hemoglobin A1c is 12 8  Patient on a diabetic diet  Patient was counseled about compliance  Patient had some episodes of low blood sugars secondary to insulin  Will change Lantus to 8units once daily in the morning  Add metformin 500 mg 1 tablet daily  Cautious on use of oral hypoglycemic agents secondary to his acute pancreatitis and labile blood sugars      (P) [phone]        Hyponatremia   Assessment & Plan    Secondary to hyperglycemia  Now resolved  sodium last was 140        * 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 2 diabetes mellitus [facility])   Assessment & Plan    Multifactorial secondary to poorly-controlled diabetes for years  Increase Neurontin to 200 mg oral twice daily    Advised the patient to follow up outpatient with his family doctor for further diagnosis and treatment of his neuropathy  Will set up outpatient physical therapy  vitamin B12 level is normal and lyme is pending  MRI of the brain done and just show some cerebral atrophy  Discharging Physician / Practitioner: Gus Mccauley MD  PCP: An Casarez DO  Admission Date:   Admission Orders     Ordered        08/10/18 2053  Inpatient Admission (expected length of stay for this patient is greater than two midnights)  Once             Discharge Date: 08/16/18    Resolved Problems  Date Reviewed: 8/16/2018          Resolved    Diabetic ketoacidosis without coma ([facility] [address] ) 8/13/2018     Resolved by  Gus Mccauley MD          Consultations During Hospital Stay:  · Social service and dietary    Procedures Performed:     · None    Significant Findings / Test Results:     · Uncontrolled diabetes mellitus type 2    Incidental Findings:   · None     Test Results Pending at Discharge (will require follow up): · None     Outpatient Tests Requested:  · CMP and lipase in 2 weeks    Complications:  None    Reason for Admission:  Abdominal pain    Hospital Course:     Yazmin Humphries is a 45 y o  male patient who originally presented to the hospital on 8/10/2018 due to acute pancreatitis  Etiology of pancreatitis could not be determined as he has no gallbladder and also not an alcoholic and LFTs were normal   Was felt to be seen either viral or idiopathic  Patient's lipase is decreasing  He is tolerating an oral diet  He has very labile blood sugars and his hemoglobin A1c was 12 8  He was counseled on dietary modification and also placed on Lantus once daily  He does have very labile blood sugars hence only careful adjustments of medications are recommended  I also placed him on metformin 500 mg 1 tablet daily  I have asked him to follow up with his PCP in 2 weeks and have repeat CMP and lipase done in 2 weeks    The patient does not want to leave the hospital he feels very comfortable here and feels like he has no help on the outside  I did ask  to meet with him and also as dietitian to meet with him to given counseling on his diabetes and help him set up with outpatient services  He is being discharged in a stable condition  Please see above list of diagnoses and related plan for additional information  Condition at Discharge: good     Discharge Day Visit / Exam:     Subjective:  Patient complains being unprepared before going home  He states that he has not met any social workers or dietitians  I have explained to him that I have personally seen him meet them twice since he has been admitted  He states that he has no [place] a bus application completed nor does he have meals all the time or money to buy food all the time  Denies any abdominal pain today  Still complains of numbness on the left side of his body  Vitals: Blood Pressure: 102/55 (08/16/18 0827)  Pulse: 69 (08/16/18 0830)  Temperature: 97 7 °F (36 5 °C) (08/16/18 0800)  Temp Source: Temporal (08/16/18 0800)  Respirations: 16 (08/16/18 0800)  Height: 5' 6 5" (168 9 cm) (08/10/18 2228)  Weight - Scale: 51 kg (112 lb 7 oz) (08/10/18 2228)  SpO2: 96 % (08/16/18 0800)  Exam:   Physical Exam   Constitutional: He is oriented to person, place, and time  He appears well-developed and well-nourished  HENT:   Head: Normocephalic and atraumatic  Right Ear: External ear normal    Left Ear: External ear normal    Mouth/Throat: Oropharynx is clear and moist    Eyes: Conjunctivae and EOM are normal  Pupils are equal, round, and reactive to light  Neck: Normal range of motion  Neck supple  Cardiovascular: Normal rate, regular rhythm, normal heart sounds and intact distal pulses  Pulmonary/Chest: Effort normal and breath sounds normal    Abdominal: Soft  Bowel sounds are normal  He exhibits no mass  There is no tenderness  There is no rebound and no guarding     Genitourinary:   Genitourinary Comments: deferred Musculoskeletal: Normal range of motion  Decreased sensation noted in left arm and left leg   Neurological: He is alert and oriented to person, place, and time  He has normal reflexes  Skin: Skin is warm and dry  No rash noted  Psychiatric: He has a normal mood and affect  Nursing note and vitals reviewed  Discussion with Family: none    Discharge instructions/Information to patient and family:   See after visit summary for information provided to patient and family  Provisions for Follow-Up Care:  See after visit summary for information related to follow-up care and any pertinent home health orders  Disposition:     Home with VNA Services (Reminder: Complete face to face encounter)    For Discharges to [facility] SNF:   · Not Applicable to this Patient - Not Applicable to this Patient    Planned Readmission: none     Discharge Statement:  I spent more than 35 minutes discharging the patient  This time was spent on the day of discharge  I had direct contact with the patient on the day of discharge  Greater than 50% of the total time was spent examining patient, answering all patient questions, arranging and discussing plan of care with patient as well as directly providing post-discharge instructions  Additional time then spent on discharge activities  Discharge Medications:  See after visit summary for reconciled discharge medications provided to patient and family        ** Please Note: This note has been constructed using a voice recognition system **

## 2018-08-16 NOTE — NURSING NOTE
Discharged from unit ambulatory  Escorted to lobby by unit staff  Pt donell is taking bus to home and will have his prescriptions delivered to his apartment

## 2018-08-16 NOTE — SOCIAL WORK
Call from DAYA Ochoa from [facility] [phone] re: pt   Pt is new to team and requested this CM assist pt with paperwork needing completed  CM met with pt completing [address] [facility] [address] application (Cm will send certified mail) , Reduced Transportation Fare Renewal application (pt instructed to take to [facility] office) and housing form (pt instructed to take to apartment office)  All instructions placed on AVS   Pt contacted [facility]  with appointment made for Oct 15th at 1pm (on AVS)  Day bus pass provided for pt's d/c transportation  Attending plans to d/c this afternoon after 1600 (monitoring blood sugar as sugar in 300's at 1130)  No further d/c needs identified

## 2018-08-16 NOTE — ASSESSMENT & PLAN NOTE
Unclear etiology  Patient has pancreatitis maybe related to medication use,viral infection or idiopathic  Clinically improving    Denies any epigastric pain today

## 2018-08-17 LAB
B BURGDOR IGG SER IA-ACNC: 0.08
B BURGDOR IGM SER IA-ACNC: 0.38
GLUCOSE SERPL-MCNC: 388 MG/DL (ref 70–99)

## 2018-08-17 NOTE — CASE MANAGEMENT
Notification of Discharge  This is a Notification of Discharge from our facility [address]  Please be advised that this patient has been discharge from our facility  Below you will find the admission and discharge date and time including the patients disposition  PRESENTATION DATE: 8/10/2018  6:06 PM  IP ADMISSION DATE: 8/10/18 2053  DISCHARGE DATE: 8/16/2018  5:37 PM  DISPOSITION: Home with [address] [place] [address] in the [facility] Utilization Review Department  Phone: [phone]; Fax [phone]  ATTENTION: The Network Utilization Review Department is now centralized for our 9 Facilities  Make a note that we have a new phone and fax numbers for our Department  Please call with any questions or concerns to [phone] and carefully follow the prompts so that you are directed to the right person  All voicemails are confidential  Fax any determinations, approvals, denials, and requests for initial or continue stay review clinical to [phone]  Due to HIGH CALL volume, it would be easier if you could please send faxed requests to expedite your requests and in part, help us provide discharge notifications faster

## 2018-12-12 ENCOUNTER — APPOINTMENT (EMERGENCY)
Dept: RADIOLOGY | Facility: HOSPITAL | Age: 53
End: 2018-12-12
Payer: COMMERCIAL

## 2018-12-12 ENCOUNTER — HOSPITAL ENCOUNTER (EMERGENCY)
Facility: HOSPITAL | Age: 53
Discharge: HOME | End: 2018-12-12
Attending: EMERGENCY MEDICINE | Admitting: EMERGENCY MEDICINE
Payer: COMMERCIAL

## 2018-12-12 VITALS
WEIGHT: 111.99 LBS | DIASTOLIC BLOOD PRESSURE: 83 MMHG | BODY MASS INDEX: 18.08 KG/M2 | TEMPERATURE: 97.9 F | RESPIRATION RATE: 18 BRPM | OXYGEN SATURATION: 99 % | SYSTOLIC BLOOD PRESSURE: 184 MMHG | HEART RATE: 86 BPM

## 2018-12-12 DIAGNOSIS — M25.561 BILATERAL KNEE PAIN: Primary | ICD-10-CM

## 2018-12-12 DIAGNOSIS — M25.562 BILATERAL KNEE PAIN: Primary | ICD-10-CM

## 2018-12-12 LAB
ALBUMIN SERPL BCP-MCNC: 3.2 G/DL (ref 3.5–5)
ALP SERPL-CCNC: 148 U/L (ref 46–116)
ALT SERPL W P-5'-P-CCNC: 57 U/L (ref 12–78)
ANION GAP SERPL CALCULATED.3IONS-SCNC: 10 MMOL/L (ref 4–13)
ANION GAP SERPL CALCULATED.3IONS-SCNC: 11 MMOL/L (ref 4–13)
AST SERPL W P-5'-P-CCNC: 80 U/L (ref 5–45)
BACTERIA UR QL AUTO: ABNORMAL /HPF
BASOPHILS # BLD AUTO: 0.05 THOUSANDS/ΜL (ref 0–0.1)
BASOPHILS NFR BLD AUTO: 1 % (ref 0–1)
BILIRUB DIRECT SERPL-MCNC: 0.05 MG/DL (ref 0–0.2)
BILIRUB SERPL-MCNC: 0.74 MG/DL (ref 0.2–1)
BILIRUB UR QL STRIP: NEGATIVE
BUN SERPL-MCNC: 20 MG/DL (ref 5–25)
BUN SERPL-MCNC: 24 MG/DL (ref 5–25)
CALCIUM SERPL-MCNC: 8.9 MG/DL (ref 8.3–10.1)
CALCIUM SERPL-MCNC: 9 MG/DL (ref 8.3–10.1)
CHLORIDE SERPL-SCNC: 100 MMOL/L (ref 100–108)
CHLORIDE SERPL-SCNC: 95 MMOL/L (ref 100–108)
CLARITY UR: CLEAR
CO2 SERPL-SCNC: 23 MMOL/L (ref 21–32)
CO2 SERPL-SCNC: 24 MMOL/L (ref 21–32)
COLOR UR: YELLOW
CREAT SERPL-MCNC: 0.35 MG/DL (ref 0.6–1.3)
CREAT SERPL-MCNC: 0.73 MG/DL (ref 0.6–1.3)
EOSINOPHIL # BLD AUTO: 0.02 THOUSAND/ΜL (ref 0–0.61)
EOSINOPHIL NFR BLD AUTO: 0 % (ref 0–6)
ERYTHROCYTE [DISTWIDTH] IN BLOOD BY AUTOMATED COUNT: 13.5 % (ref 11.6–15.1)
GFR SERPL CREATININE-BSD FRML MDRD: 122 ML/MIN/1.73SQ M
GFR SERPL CREATININE-BSD FRML MDRD: 164 ML/MIN/1.73SQ M
GLUCOSE SERPL-MCNC: 263 MG/DL (ref 65–140)
GLUCOSE SERPL-MCNC: 352 MG/DL (ref 65–140)
GLUCOSE UR STRIP-MCNC: ABNORMAL MG/DL
HCT VFR BLD AUTO: 31.3 % (ref 36.5–49.3)
HGB BLD-MCNC: 10.5 G/DL (ref 12–17)
HGB UR QL STRIP.AUTO: ABNORMAL
IMM GRANULOCYTES # BLD AUTO: 0.03 THOUSAND/UL (ref 0–0.2)
IMM GRANULOCYTES NFR BLD AUTO: 0 % (ref 0–2)
KETONES UR STRIP-MCNC: ABNORMAL MG/DL
LEUKOCYTE ESTERASE UR QL STRIP: NEGATIVE
LYMPHOCYTES # BLD AUTO: 0.89 THOUSANDS/ΜL (ref 0.6–4.47)
LYMPHOCYTES NFR BLD AUTO: 12 % (ref 14–44)
MCH RBC QN AUTO: 32.5 PG (ref 26.8–34.3)
MCHC RBC AUTO-ENTMCNC: 33.5 G/DL (ref 31.4–37.4)
MCV RBC AUTO: 97 FL (ref 82–98)
MONOCYTES # BLD AUTO: 0.6 THOUSAND/ΜL (ref 0.17–1.22)
MONOCYTES NFR BLD AUTO: 8 % (ref 4–12)
NEUTROPHILS # BLD AUTO: 5.63 THOUSANDS/ΜL (ref 1.85–7.62)
NEUTS SEG NFR BLD AUTO: 79 % (ref 43–75)
NITRITE UR QL STRIP: NEGATIVE
NON-SQ EPI CELLS URNS QL MICRO: ABNORMAL /HPF
NRBC BLD AUTO-RTO: 0 /100 WBCS
PH UR STRIP.AUTO: 5 [PH] (ref 4.5–8)
PLATELET # BLD AUTO: 196 THOUSANDS/UL (ref 149–390)
PMV BLD AUTO: 12.4 FL (ref 8.9–12.7)
POTASSIUM SERPL-SCNC: 3.6 MMOL/L (ref 3.5–5.3)
POTASSIUM SERPL-SCNC: 7 MMOL/L (ref 3.5–5.3)
PROT SERPL-MCNC: 7.8 G/DL (ref 6.4–8.2)
PROT UR STRIP-MCNC: >=300 MG/DL
RBC # BLD AUTO: 3.23 MILLION/UL (ref 3.88–5.62)
RBC #/AREA URNS AUTO: ABNORMAL /HPF
SODIUM SERPL-SCNC: 129 MMOL/L (ref 136–145)
SODIUM SERPL-SCNC: 134 MMOL/L (ref 136–145)
SP GR UR STRIP.AUTO: 1.01 (ref 1–1.03)
TSH SERPL DL<=0.05 MIU/L-ACNC: 2.72 UIU/ML (ref 0.36–3.74)
UROBILINOGEN UR QL STRIP.AUTO: 0.2 E.U./DL
WBC # BLD AUTO: 7.22 THOUSAND/UL (ref 4.31–10.16)
WBC #/AREA URNS AUTO: ABNORMAL /HPF

## 2018-12-12 PROCEDURE — 36415 COLL VENOUS BLD VENIPUNCTURE: CPT | Performed by: EMERGENCY MEDICINE

## 2018-12-12 PROCEDURE — 84443 ASSAY THYROID STIM HORMONE: CPT | Performed by: EMERGENCY MEDICINE

## 2018-12-12 PROCEDURE — 85025 COMPLETE CBC W/AUTO DIFF WBC: CPT | Performed by: EMERGENCY MEDICINE

## 2018-12-12 PROCEDURE — 96361 HYDRATE IV INFUSION ADD-ON: CPT

## 2018-12-12 PROCEDURE — 81001 URINALYSIS AUTO W/SCOPE: CPT

## 2018-12-12 PROCEDURE — 96374 THER/PROPH/DIAG INJ IV PUSH: CPT

## 2018-12-12 PROCEDURE — 99285 EMERGENCY DEPT VISIT HI MDM: CPT

## 2018-12-12 PROCEDURE — 72100 X-RAY EXAM L-S SPINE 2/3 VWS: CPT

## 2018-12-12 PROCEDURE — 80076 HEPATIC FUNCTION PANEL: CPT | Performed by: EMERGENCY MEDICINE

## 2018-12-12 PROCEDURE — 80048 BASIC METABOLIC PNL TOTAL CA: CPT | Performed by: EMERGENCY MEDICINE

## 2018-12-12 PROCEDURE — 93005 ELECTROCARDIOGRAM TRACING: CPT

## 2018-12-12 RX ORDER — PANTOPRAZOLE SODIUM 40 MG/1
40 TABLET, DELAYED RELEASE ORAL DAILY
Status: ON HOLD | COMMUNITY
End: 2020-01-28 | Stop reason: CLARIF

## 2018-12-12 RX ORDER — ATORVASTATIN CALCIUM 40 MG/1
40 TABLET, FILM COATED ORAL DAILY
COMMUNITY
End: 2020-07-05 | Stop reason: HOSPADM

## 2018-12-12 RX ORDER — PANTOPRAZOLE SODIUM 40 MG/1
40 TABLET, DELAYED RELEASE ORAL DAILY
Status: ON HOLD | COMMUNITY
End: 2020-01-27 | Stop reason: CLARIF

## 2018-12-12 RX ORDER — NAPROXEN 500 MG/1
500 TABLET ORAL 2 TIMES DAILY WITH MEALS
Qty: 30 TABLET | Refills: 0 | Status: ON HOLD | OUTPATIENT
Start: 2018-12-12 | End: 2020-01-28 | Stop reason: CLARIF

## 2018-12-12 RX ORDER — LOSARTAN POTASSIUM 50 MG/1
25 TABLET ORAL DAILY
COMMUNITY
End: 2020-02-01 | Stop reason: HOSPADM

## 2018-12-12 RX ORDER — KETOROLAC TROMETHAMINE 30 MG/ML
15 INJECTION, SOLUTION INTRAMUSCULAR; INTRAVENOUS ONCE
Status: COMPLETED | OUTPATIENT
Start: 2018-12-12 | End: 2018-12-12

## 2018-12-12 RX ORDER — CITALOPRAM 40 MG/1
40 TABLET ORAL DAILY
COMMUNITY

## 2018-12-12 RX ORDER — FUROSEMIDE 10 MG/ML
20 INJECTION INTRAMUSCULAR; INTRAVENOUS ONCE
Status: DISCONTINUED | OUTPATIENT
Start: 2018-12-12 | End: 2018-12-13 | Stop reason: HOSPADM

## 2018-12-12 RX ADMIN — SODIUM CHLORIDE 1000 ML: 0.9 INJECTION, SOLUTION INTRAVENOUS at 19:17

## 2018-12-12 RX ADMIN — SODIUM CHLORIDE 1000 ML: 0.9 INJECTION, SOLUTION INTRAVENOUS at 21:57

## 2018-12-12 RX ADMIN — KETOROLAC TROMETHAMINE 15 MG: 30 INJECTION, SOLUTION INTRAMUSCULAR at 19:17

## 2018-12-12 NOTE — ED PROVIDER NOTES
History  Chief Complaint   Patient presents with    Weakness - Generalized     Pt  brought in via EMS  Pt  reports feeling weak on left side and not being able to scratch his back  Pt  states he has been feeling like this for about a month  Jovi any physical pain  46 YO male, poor historian, presents stating he needs a doctor  States he has neuropathic pain in the B/L knees, notes this has been present for some time, constant, unable to explain how long exactly  Additionally pt has pain in the Left lower back, also chronic issue, states this has not changed  Pt notes he needs a doctor to figure out why he has had this pain  Additionally complains of numbness in the Left forearm for greater than 1 month  States he does have feeling in the extremity but again admits that it feels numb  Pt denies any new symptoms or complaints  States he has difficulty caring for himself at home but is not able to give any specific reasons why  Pt denies CP/SOB/F/C/N/V/D/C, no dysuria, burning on urination or blood in urine  History provided by:  Patient and EMS personnel   used: No    Knee Pain   Location:  Knee  Injury: no    Knee location:  L knee and R knee  Pain details:     Quality:  Aching    Radiates to:  Does not radiate    Severity:  Moderate    Onset quality:  Unable to specify    Timing:  Constant    Progression:  Waxing and waning  Chronicity:  Chronic  Prior injury to area:  No  Relieved by:  Nothing  Worsened by:  Nothing  Ineffective treatments:  None tried  Associated symptoms: back pain and numbness    Associated symptoms: no decreased ROM, no fever, no muscle weakness, no neck pain, no stiffness, no swelling and no tingling        Prior to Admission Medications   Prescriptions Last Dose Informant Patient Reported? Taking?    amLODIPine (NORVASC) 10 mg tablet   No Yes   Sig: Take 1 tablet (10 mg total) by mouth daily   atorvastatin (LIPITOR) 40 mg tablet   Yes Yes   Sig: Take 40 mg by mouth daily   citalopram (CELEXA) 20 mg tablet   No Yes   Sig: Take 1 tablet (20 mg total) by mouth daily   citalopram (CeleXA) 40 mg tablet   Yes Yes   Sig: Take 40 mg by mouth daily   famotidine (PEPCID) 20 mg tablet   No Yes   Sig: Take 1 tablet (20 mg total) by mouth daily   gabapentin (NEURONTIN) 100 mg capsule   No No   Sig: Take 1 capsule (100 mg total) by mouth 2 (two) times a day   hydrochlorothiazide (HYDRODIURIL) 12 5 mg tablet   No Yes   Sig: Take 1 tablet (12 5 mg total) by mouth daily   insulin glargine (BASAGLAR KWIKPEN) 100 units/mL injection pen   No No   Sig: Inject 14 Units under the skin daily before breakfast   losartan (COZAAR) 50 mg tablet   Yes Yes   Sig: Take 25 mg by mouth daily   metFORMIN (GLUCOPHAGE) 1000 MG tablet   No Yes   Sig: Take 1 tablet (1,000 mg total) by mouth daily with breakfast   metoprolol tartrate (LOPRESSOR) 50 mg tablet   No No   Sig: Take 1 tablet (50 mg total) by mouth every 12 (twelve) hours   mirtazapine (REMERON) 15 mg tablet   No Yes   Sig: Take 1 tablet (15 mg total) by mouth daily at bedtime   nystatin-triamcinolone (MYCOLOG-II) cream   No No   Sig: Apply topically 2 (two) times a day   pantoprazole (PROTONIX) 40 mg tablet   Yes Yes   Sig: Take 40 mg by mouth daily   pantoprazole (PROTONIX) 40 mg tablet   Yes Yes   Sig: Take 40 mg by mouth daily      Facility-Administered Medications: None       Past Medical History:   Diagnosis Date    Diabetes mellitus ([facility] [address] )     GERD (gastroesophageal reflux disease)     Hyperlipidemia     Hypertension     Migraine     Psychiatric disorder        Past Surgical History:   Procedure Laterality Date    ESOPHAGOGASTRODUODENOSCOPY N/A 9/7/2016    Procedure: ESOPHAGOGASTRODUODENOSCOPY (EGD); Surgeon: Marda Dubin, MD;  Location: [facility]; Service:        History reviewed  No pertinent family history  I have reviewed and agree with the history as documented      Social History   Substance Use Topics    Smoking status: Former Smoker    Smokeless tobacco: Never Used    Alcohol use No        Review of Systems   Constitutional: Negative for fever  HENT: Negative for dental problem  Eyes: Negative for visual disturbance  Respiratory: Negative for shortness of breath  Cardiovascular: Negative for chest pain  Gastrointestinal: Negative for abdominal pain, nausea and vomiting  Genitourinary: Negative for dysuria and frequency  Musculoskeletal: Positive for back pain  Negative for neck pain, neck stiffness and stiffness  Skin: Negative for rash  Neurological: Negative for dizziness, weakness and light-headedness  Psychiatric/Behavioral: Negative for agitation, behavioral problems and confusion  All other systems reviewed and are negative  Physical Exam  Physical Exam   Constitutional: He is oriented to person, place, and time  He appears well-developed and well-nourished  HENT:   Head: Normocephalic and atraumatic  Eyes: EOM are normal    Neck: Normal range of motion  Cardiovascular: Normal rate, regular rhythm and normal heart sounds  Pulmonary/Chest: Effort normal and breath sounds normal    Abdominal: Soft  Musculoskeletal: Normal range of motion  No deformity to B/L knees  Stable ligamentous exam    Neurological: He is alert and oriented to person, place, and time  He displays normal reflexes  No cranial nerve deficit or sensory deficit  He exhibits normal muscle tone  Coordination normal    Skin: Skin is warm and dry  Psychiatric: He has a normal mood and affect  His behavior is normal    Nursing note and vitals reviewed        Vital Signs  ED Triage Vitals [12/12/18 1754]   Temperature Pulse Respirations Blood Pressure SpO2   97 9 °F (36 6 °C) 94 18 (!) 184/86 100 %      Temp src Heart Rate Source Patient Position - Orthostatic VS BP Location FiO2 (%)   -- Monitor Lying Right arm --      Pain Score       No Pain           Vitals:    12/12/18 1754 12/12/18 1932 12/12/18 2203 12/12/18 2308   BP: (!) 184/86 (!) 175/88 (!) 202/93 (!) 184/83   Pulse: 94 90 87 86   Patient Position - Orthostatic VS: Lying Lying Lying Lying       Visual Acuity      ED Medications  Medications   sodium chloride 0 9 % bolus 1,000 mL (0 mL Intravenous Stopped 12/12/18 2154)   ketorolac (TORADOL) injection 15 mg (15 mg Intravenous Given 12/12/18 1917)   sodium chloride 0 9 % bolus 1,000 mL (0 mL Intravenous Stopped 12/12/18 2308)       Diagnostic Studies  Results Reviewed     Procedure Component Value Units Date/Time    Urine Microscopic [[id number]]  (Abnormal) Collected:  12/12/18 2109    Lab Status:  Final result Specimen:  Urine from Urine, Clean Catch Updated:  12/12/18 2224     RBC, UA 2-4 (A) /hpf      WBC, UA 0-1 (A) /hpf      Epithelial Cells Moderate (A) /hpf      Bacteria, UA None Seen /hpf     Basic metabolic panel [[id number]]  (Abnormal) Collected:  12/12/18 2153    Lab Status:  Final result Specimen:  Blood from Arm, Left Updated:  12/12/18 2217     Sodium 134 (L) mmol/L      Potassium 3 6 mmol/L      Chloride 100 mmol/L      CO2 24 mmol/L      ANION GAP 10 mmol/L      BUN 20 mg/dL      Creatinine 0 73 mg/dL      Glucose 263 (H) mg/dL      Calcium 8 9 mg/dL      eGFR 122 ml/min/1 73sq m     Narrative:         National Kidney Disease Education Program recommendations are as follows:  GFR calculation is accurate only with a steady state creatinine  Chronic Kidney disease less than 60 ml/min/1 73 sq  meters  Kidney failure less than 15 ml/min/1 73 sq  meters      Basic metabolic panel [[id number]]  (Abnormal) Collected:  12/12/18 2048    Lab Status:  Final result Specimen:  Blood from Hand, Right Updated:  12/12/18 2127     Sodium 129 (L) mmol/L      Potassium 7 0 (HH) mmol/L      Chloride 95 (L) mmol/L      CO2 23 mmol/L      ANION GAP 11 mmol/L      BUN 24 mg/dL      Creatinine 0 35 (L) mg/dL      Glucose 352 (H) mg/dL      Calcium 9 0 mg/dL      eGFR 164 ml/min/1 73sq m     Narrative:       Specimen Hemolyzed Results may be effected  National Kidney Disease Education Program recommendations are as follows:  GFR calculation is accurate only with a steady state creatinine  Chronic Kidney disease less than 60 ml/min/1 73 sq  meters  Kidney failure less than 15 ml/min/1 73 sq  meters  POCT urinalysis dipstick [[id number]]  (Abnormal) Resulted:  12/12/18 2055    Lab Status:  Final result Specimen:  Urine Updated:  12/12/18 2055    ED Urine Macroscopic [[id number]]  (Abnormal) Collected:  12/12/18 2109    Lab Status:  Final result Specimen:  Urine Updated:  12/12/18 2054     Color, UA Yellow     Clarity, UA Clear     pH, UA 5 0     Leukocytes, UA Negative     Nitrite, UA Negative     Protein, UA >=300 (A) mg/dl      Glucose,  (1/2%) (A) mg/dl      Ketones, UA 15 (1+) (A) mg/dl      Urobilinogen, UA 0 2 E U /dl      Bilirubin, UA Negative     Blood, UA Moderate (A)     Specific [place], UA 1 015    Narrative:       CLINITEK RESULT    Hepatic function panel [[id number]]  (Abnormal) Collected:  12/12/18 1915    Lab Status:  Final result Specimen:  Blood from Hand, Right Updated:  12/12/18 2039     Total Bilirubin 0 74 mg/dL      Bilirubin, Direct 0 05 mg/dL      Alkaline Phosphatase 148 (H) U/L      AST 80 (H) U/L      ALT 57 U/L      Total Protein 7 8 g/dL      Albumin 3 2 (L) g/dL     TSH [[id number]]  (Normal) Collected:  12/12/18 1915    Lab Status:  Final result Specimen:  Blood from Hand, Right Updated:  12/12/18 2039     TSH 3RD GENERATON 2 723 uIU/mL     Narrative:         Patients undergoing fluorescein dye angiography may retain small amounts of fluorescein in the body for 48-72 hours post procedure  Samples containing fluorescein can produce falsely depressed TSH values  If the patient had this procedure,a specimen should be resubmitted post fluorescein clearance      CBC and differential [[id number]]  (Abnormal) Collected:  12/12/18 1915    Lab Status:  Final result Specimen:  Blood from Hand, Right Updated:  12/12/18 1925     WBC 7 22 Thousand/uL      RBC 3 23 (L) Million/uL      Hemoglobin 10 5 (L) g/dL      Hematocrit 31 3 (L) %      MCV 97 fL      MCH 32 5 pg      MCHC 33 5 g/dL      RDW 13 5 %      MPV 12 4 fL      Platelets 332 Thousands/uL      nRBC 0 /100 WBCs      Neutrophils Relative 79 (H) %      Immat GRANS % 0 %      Lymphocytes Relative 12 (L) %      Monocytes Relative 8 %      Eosinophils Relative 0 %      Basophils Relative 1 %      Neutrophils Absolute 5 63 Thousands/µL      Immature Grans Absolute 0 03 Thousand/uL      Lymphocytes Absolute 0 89 Thousands/µL      Monocytes Absolute 0 60 Thousand/µL      Eosinophils Absolute 0 02 Thousand/µL      Basophils Absolute 0 05 Thousands/µL                  XR lumbar spine 2 or 3 views   Final Result by Caryle Fine, MD (12/13 0834)      No acute osseous abnormality  Workstation performed: [id number]                    Procedures  Procedures       Phone Contacts  ED Phone Contact    ED Course                               MDM  Number of Diagnoses or Management Options  Bilateral knee pain: new and requires workup  Diagnosis management comments: 1  Knee pain - Pt with multiple complaints regarding chronic conditions  States he has known neuropathic pain in the LE's B/L  He notes numbness in the Left upper extremity that has been present for greater than 1 month, he has no objective findings on physical exam, having normal sensation over the extremity  Will check electrolytes for abnormalities, CBC, urine for infection  Pt has complained of being unable to walk but has been witnessed to walk without difficulty in the ED  Likely discharge with numbers for follow up (PCP, orthopedics, PT)         Amount and/or Complexity of Data Reviewed  Clinical lab tests: ordered and reviewed  Obtain history from someone other than the patient: yes    Patient Progress  Patient progress: stable    CritCare Time    Disposition  Final diagnoses:   Bilateral knee pain     Time reflects when diagnosis was documented in both MDM as applicable and the Disposition within this note     Time User Action Codes Description Comment    12/12/2018 10:47 PM Yong Eng Add [K10 739,  M21 776] Bilateral knee pain       ED Disposition     ED Disposition Condition Comment    Discharge  Clarisa Ramos discharge to home/self care      Condition at discharge: Stable        Follow-up Information     Follow up With Specialties Details Why Contact Info Additional [address], DO Family Medicine Schedule an appointment as soon as possible for a visit  [facility] [phone]  [address] [facility],  Orthopedic Surgery Schedule an appointment as soon as possible for a visit  [address]  [address] [facility] Physical Therapy Schedule an appointment as soon as possible for a visit  [address] [facility] [id number]  [address] [place] [address], [phone]          Discharge Medication List as of 12/12/2018 10:53 PM      CONTINUE these medications which have NOT CHANGED    Details   amLODIPine (NORVASC) 10 mg tablet Take 1 tablet (10 mg total) by mouth daily, Starting Fri 8/17/2018, Normal      atorvastatin (LIPITOR) 40 mg tablet Take 40 mg by mouth daily, Historical Med      !! citalopram (CELEXA) 20 mg tablet Take 1 tablet (20 mg total) by mouth daily, Starting Thu 8/16/2018, Normal      !! citalopram (CeleXA) 40 mg tablet Take 40 mg by mouth daily, Historical Med      famotidine (PEPCID) 20 mg tablet Take 1 tablet (20 mg total) by mouth daily, Starting Thu 8/16/2018, Normal      hydrochlorothiazide (HYDRODIURIL) 12 5 mg tablet Take 1 tablet (12 5 mg total) by mouth daily, Starting Fri 8/17/2018, Normal      losartan (COZAAR) 50 mg tablet Take 25 mg by mouth daily, Historical Med      metFORMIN (GLUCOPHAGE) 1000 MG tablet Take 1 tablet (1,000 mg total) by mouth daily with breakfast, Starting Fri 8/17/2018, Normal      mirtazapine (REMERON) 15 mg tablet Take 1 tablet (15 mg total) by mouth daily at bedtime, Starting Thu 8/16/2018, Normal      !! pantoprazole (PROTONIX) 40 mg tablet Take 40 mg by mouth daily, Historical Med      !! pantoprazole (PROTONIX) 40 mg tablet Take 40 mg by mouth daily, Historical Med      gabapentin (NEURONTIN) 100 mg capsule Take 1 capsule (100 mg total) by mouth 2 (two) times a day, Starting Thu 8/16/2018, Normal      insulin glargine (BASAGLAR KWIKPEN) 100 units/mL injection pen Inject 14 Units under the skin daily before breakfast, Starting Thu 8/16/2018, Normal      metoprolol tartrate (LOPRESSOR) 50 mg tablet Take 1 tablet (50 mg total) by mouth every 12 (twelve) hours, Starting Thu 8/16/2018, Normal      nystatin-triamcinolone (MYCOLOG-II) cream Apply topically 2 (two) times a day, Starting Thu 8/16/2018, Normal       !! - Potential duplicate medications found  Please discuss with provider  No discharge procedures on file      ED Provider  Electronically Signed by           Trent Oconnell MD  12/15/18 6426

## 2018-12-13 NOTE — ED NOTES
While walking to [place] patient threatened staff multiple times that he has fallen leaving the ED in the past and may fall again today  Pt noted to be walking without cane and with a steady gait during discharge  Pt given numbers for nearby cab services and shown to a telephone       Arabella Rosales RN  12/12/18 6338

## 2018-12-13 NOTE — DISCHARGE INSTRUCTIONS
Take the Naprosyn twice daily for discomfort  Call your family doctor, you should be seen in the office for further evaluation  The number for the orthopedic surgeon is also included in these discharge instructions, call for evaluation  The number for the comprehensive spine program is also included, call to be set up with physical therapy  Knee Pain   WHAT YOU NEED TO KNOW:   Knee pain may start suddenly, or it may be a long-term problem  You may have pain on the side, front, or back of your knee  You may have knee stiffness and swelling  You may hear popping sounds or feel like your knee is giving way or locking up as you walk  You may feel pain when you sit, stand, walk, or climb up and down stairs  Knee pain can be caused by conditions such as obesity, inflammation, or strains or tears in ligaments or tendons  DISCHARGE INSTRUCTIONS:   Follow up with your healthcare provider within 24 hours or as directed: You may need follow-up treatments, such as steroid injections to decrease pain  Write down your questions so you remember to ask them during your visits  Self-care:   · Rest  your knee so it can heal  Limit activities that increase your pain  · Ice  can help reduce swelling  Wrap ice in a towel and put it on your knee for as long and as often as directed  · Compression  with a brace or bandage can help reduce swelling  Use a brace or bandage only as directed  · Elevation  helps decrease pain and swelling  Elevate your knee while you are sitting or lying down  Prop your leg on pillows to keep your knee above the level of your heart  Medicines:   · NSAIDs  help decrease swelling and pain or fever  This medicine is available with or without a doctor's order  NSAIDs can cause stomach bleeding or kidney problems in certain people  If you take blood thinner medicine, always ask your healthcare provider if NSAIDs are safe for you   Always read the medicine label and follow directions  · Acetaminophen  decreases pain and fever  It is available without a doctor's order  Ask how much to take and when to take it  Follow directions  Acetaminophen can cause liver damage if not taken correctly  · Take your medicine as directed  Contact your healthcare provider if you think your medicine is not helping or if you have side effects  Tell him or her if you are allergic to any medicine  Keep a list of the medicines, vitamins, and herbs you take  Include the amounts, and when and why you take them  Bring the list or the pill bottles to follow-up visits  Carry your medicine list with you in case of an emergency  Exercise as directed: You may need to see a physical therapist or do recommended exercises to improve movement and decrease your pain  You may be directed to walk, swim, or ride a bike  Follow your exercise plan exactly as directed to avoid further injury  Contact your healthcare provider if:   · You have questions or concerns about your condition or care  Return to the emergency department if:   · Your pain is worse, even after treatment  · You cannot bend or straighten your leg completely  · The swelling around your knee does not go down even with treatment  · Your knee is painful and hot to the touch  © 2017 [employer]  Information is for End User's use only and may not be sold, redistributed or otherwise used for commercial purposes  All illustrations and images included in CareNotes® are the copyrighted property of [employer]  or [employer]  The above information is an  only  It is not intended as medical advice for individual conditions or treatments  Talk to your doctor, nurse or pharmacist before following any medical regimen to see if it is safe and effective for you

## 2018-12-13 NOTE — ED NOTES
While reviewing discharge instructions with patient, patient began to get frustrated that he was being discharged  Pt begins stating he doesn't like his cane and wants a new one, that he doesn't know how to take his medications and needs a ride home because he cannot walk  Explained to pt I can ask the provider about a cane but he would need to sign a paper stating he is responsible for paying for the cane  Also reviewed medication list with patient that was provided with discharge instructions  Explained to pt a ride may not be provided and needs to be approved the hospital supervisor  Pt then jumped up and began walking around room without assistance while yelling at nursing staff       Alexis Diaz RN  12/12/18 4853

## 2018-12-13 NOTE — ED NOTES
Per Dr Nieves Ibrahim, BMP to be redrawn prior to starting calcium gluconate        Alysha Dougherty RN  12/12/18 9513

## 2018-12-13 NOTE — ED NOTES
Per Dr Avril Louise, calcium gluconate and lasix not needed to be given at this time due to normal Potassium     Rodo Santiago RN  12/12/18 8332

## 2018-12-15 LAB
ATRIAL RATE: 84 BPM
P AXIS: 83 DEGREES
PR INTERVAL: 182 MS
QRS AXIS: 52 DEGREES
QRSD INTERVAL: 90 MS
QT INTERVAL: 350 MS
QTC INTERVAL: 413 MS
T WAVE AXIS: 79 DEGREES
VENTRICULAR RATE: 84 BPM

## 2018-12-15 PROCEDURE — 93010 ELECTROCARDIOGRAM REPORT: CPT | Performed by: INTERNAL MEDICINE

## 2020-01-27 ENCOUNTER — APPOINTMENT (INPATIENT)
Dept: CT IMAGING | Facility: HOSPITAL | Age: 55
DRG: 469 | End: 2020-01-27
Payer: COMMERCIAL

## 2020-01-27 ENCOUNTER — APPOINTMENT (EMERGENCY)
Dept: CT IMAGING | Facility: HOSPITAL | Age: 55
DRG: 469 | End: 2020-01-27
Payer: COMMERCIAL

## 2020-01-27 ENCOUNTER — HOSPITAL ENCOUNTER (INPATIENT)
Facility: HOSPITAL | Age: 55
LOS: 5 days | Discharge: HOME HEALTH | DRG: 469 | End: 2020-02-01
Attending: EMERGENCY MEDICINE | Admitting: INTERNAL MEDICINE
Payer: COMMERCIAL

## 2020-01-27 DIAGNOSIS — R19.7 DIARRHEA: ICD-10-CM

## 2020-01-27 DIAGNOSIS — R39.89 PNEUMATURIA: ICD-10-CM

## 2020-01-27 DIAGNOSIS — E46 MALNUTRITION (HCC): ICD-10-CM

## 2020-01-27 DIAGNOSIS — R93.41 ABNORMAL CT SCAN, BLADDER: ICD-10-CM

## 2020-01-27 DIAGNOSIS — R26.2 AMBULATORY DYSFUNCTION: Chronic | ICD-10-CM

## 2020-01-27 DIAGNOSIS — E11.65 TYPE 2 DIABETES MELLITUS WITH HYPERGLYCEMIA, WITHOUT LONG-TERM CURRENT USE OF INSULIN (HCC): ICD-10-CM

## 2020-01-27 DIAGNOSIS — K21.9 GERD (GASTROESOPHAGEAL REFLUX DISEASE): ICD-10-CM

## 2020-01-27 DIAGNOSIS — R11.2 NAUSEA AND VOMITING: Primary | ICD-10-CM

## 2020-01-27 DIAGNOSIS — D64.9 ANEMIA: ICD-10-CM

## 2020-01-27 DIAGNOSIS — R42 DIZZINESS: ICD-10-CM

## 2020-01-27 DIAGNOSIS — N30.80 EMPHYSEMATOUS CYSTITIS: ICD-10-CM

## 2020-01-27 DIAGNOSIS — N17.9 AKI (ACUTE KIDNEY INJURY) (HCC): ICD-10-CM

## 2020-01-27 LAB
ALBUMIN SERPL BCP-MCNC: 4.2 G/DL (ref 3.5–5)
ALP SERPL-CCNC: 112 U/L (ref 46–116)
ALT SERPL W P-5'-P-CCNC: 76 U/L (ref 12–78)
ANION GAP SERPL CALCULATED.3IONS-SCNC: 18 MMOL/L (ref 4–13)
APTT PPP: 23 SECONDS (ref 23–37)
AST SERPL W P-5'-P-CCNC: 53 U/L (ref 5–45)
BASOPHILS # BLD AUTO: 0.06 THOUSANDS/ΜL (ref 0–0.1)
BASOPHILS NFR BLD AUTO: 1 % (ref 0–1)
BILIRUB SERPL-MCNC: 0.74 MG/DL (ref 0.2–1)
BUN SERPL-MCNC: 40 MG/DL (ref 5–25)
CALCIUM SERPL-MCNC: 9.7 MG/DL (ref 8.3–10.1)
CHLORIDE SERPL-SCNC: 95 MMOL/L (ref 100–108)
CO2 SERPL-SCNC: 28 MMOL/L (ref 21–32)
CREAT SERPL-MCNC: 3.2 MG/DL (ref 0.6–1.3)
EOSINOPHIL # BLD AUTO: 0.01 THOUSAND/ΜL (ref 0–0.61)
EOSINOPHIL NFR BLD AUTO: 0 % (ref 0–6)
ERYTHROCYTE [DISTWIDTH] IN BLOOD BY AUTOMATED COUNT: 13.9 % (ref 11.6–15.1)
GFR SERPL CREATININE-BSD FRML MDRD: 24 ML/MIN/1.73SQ M
GLUCOSE SERPL-MCNC: 151 MG/DL (ref 65–140)
GLUCOSE SERPL-MCNC: 162 MG/DL (ref 65–140)
GLUCOSE SERPL-MCNC: 165 MG/DL (ref 65–140)
HCT VFR BLD AUTO: 36.9 % (ref 36.5–49.3)
HGB BLD-MCNC: 11.9 G/DL (ref 12–17)
IMM GRANULOCYTES # BLD AUTO: 0.02 THOUSAND/UL (ref 0–0.2)
IMM GRANULOCYTES NFR BLD AUTO: 0 % (ref 0–2)
INR PPP: 0.97 (ref 0.84–1.19)
LIPASE SERPL-CCNC: 283 U/L (ref 73–393)
LYMPHOCYTES # BLD AUTO: 1.14 THOUSANDS/ΜL (ref 0.6–4.47)
LYMPHOCYTES NFR BLD AUTO: 18 % (ref 14–44)
MCH RBC QN AUTO: 32.2 PG (ref 26.8–34.3)
MCHC RBC AUTO-ENTMCNC: 32.2 G/DL (ref 31.4–37.4)
MCV RBC AUTO: 100 FL (ref 82–98)
MONOCYTES # BLD AUTO: 0.39 THOUSAND/ΜL (ref 0.17–1.22)
MONOCYTES NFR BLD AUTO: 6 % (ref 4–12)
NEUTROPHILS # BLD AUTO: 4.87 THOUSANDS/ΜL (ref 1.85–7.62)
NEUTS SEG NFR BLD AUTO: 75 % (ref 43–75)
NRBC BLD AUTO-RTO: 0 /100 WBCS
PLATELET # BLD AUTO: 263 THOUSANDS/UL (ref 149–390)
PMV BLD AUTO: 11.9 FL (ref 8.9–12.7)
POTASSIUM SERPL-SCNC: 3.1 MMOL/L (ref 3.5–5.3)
PROT SERPL-MCNC: 8.8 G/DL (ref 6.4–8.2)
PROTHROMBIN TIME: 13 SECONDS (ref 11.6–14.5)
RBC # BLD AUTO: 3.7 MILLION/UL (ref 3.88–5.62)
SODIUM SERPL-SCNC: 141 MMOL/L (ref 136–145)
WBC # BLD AUTO: 6.49 THOUSAND/UL (ref 4.31–10.16)

## 2020-01-27 PROCEDURE — 96360 HYDRATION IV INFUSION INIT: CPT

## 2020-01-27 PROCEDURE — 85610 PROTHROMBIN TIME: CPT | Performed by: PHYSICIAN ASSISTANT

## 2020-01-27 PROCEDURE — 85520 HEPARIN ASSAY: CPT | Performed by: PHYSICIAN ASSISTANT

## 2020-01-27 PROCEDURE — 99285 EMERGENCY DEPT VISIT HI MDM: CPT | Performed by: EMERGENCY MEDICINE

## 2020-01-27 PROCEDURE — 85730 THROMBOPLASTIN TIME PARTIAL: CPT | Performed by: PHYSICIAN ASSISTANT

## 2020-01-27 PROCEDURE — C9113 INJ PANTOPRAZOLE SODIUM, VIA: HCPCS | Performed by: PHYSICIAN ASSISTANT

## 2020-01-27 PROCEDURE — 99223 1ST HOSP IP/OBS HIGH 75: CPT | Performed by: INTERNAL MEDICINE

## 2020-01-27 PROCEDURE — 99285 EMERGENCY DEPT VISIT HI MDM: CPT

## 2020-01-27 PROCEDURE — 96361 HYDRATE IV INFUSION ADD-ON: CPT

## 2020-01-27 PROCEDURE — 74176 CT ABD & PELVIS W/O CONTRAST: CPT

## 2020-01-27 PROCEDURE — 70450 CT HEAD/BRAIN W/O DYE: CPT

## 2020-01-27 PROCEDURE — 82948 REAGENT STRIP/BLOOD GLUCOSE: CPT

## 2020-01-27 PROCEDURE — 36415 COLL VENOUS BLD VENIPUNCTURE: CPT | Performed by: EMERGENCY MEDICINE

## 2020-01-27 PROCEDURE — 85025 COMPLETE CBC W/AUTO DIFF WBC: CPT | Performed by: EMERGENCY MEDICINE

## 2020-01-27 PROCEDURE — 83690 ASSAY OF LIPASE: CPT | Performed by: EMERGENCY MEDICINE

## 2020-01-27 PROCEDURE — 80053 COMPREHEN METABOLIC PANEL: CPT | Performed by: EMERGENCY MEDICINE

## 2020-01-27 RX ORDER — ONDANSETRON 2 MG/ML
4 INJECTION INTRAMUSCULAR; INTRAVENOUS EVERY 6 HOURS PRN
Status: DISCONTINUED | OUTPATIENT
Start: 2020-01-27 | End: 2020-02-01 | Stop reason: HOSPADM

## 2020-01-27 RX ORDER — ACETAMINOPHEN 325 MG/1
650 TABLET ORAL EVERY 6 HOURS PRN
Status: DISCONTINUED | OUTPATIENT
Start: 2020-01-27 | End: 2020-02-01 | Stop reason: HOSPADM

## 2020-01-27 RX ORDER — PANTOPRAZOLE SODIUM 40 MG/1
40 INJECTION, POWDER, FOR SOLUTION INTRAVENOUS EVERY 12 HOURS SCHEDULED
Status: DISCONTINUED | OUTPATIENT
Start: 2020-01-27 | End: 2020-01-29

## 2020-01-27 RX ORDER — BUSPIRONE HYDROCHLORIDE 5 MG/1
5 TABLET ORAL 2 TIMES DAILY
COMMUNITY
Start: 2019-12-03

## 2020-01-27 RX ORDER — ONDANSETRON 4 MG/1
4 TABLET, ORALLY DISINTEGRATING ORAL ONCE
Status: COMPLETED | OUTPATIENT
Start: 2020-01-27 | End: 2020-01-27

## 2020-01-27 RX ORDER — SODIUM CHLORIDE 9 MG/ML
100 INJECTION, SOLUTION INTRAVENOUS CONTINUOUS
Status: DISCONTINUED | OUTPATIENT
Start: 2020-01-27 | End: 2020-01-30

## 2020-01-27 RX ADMIN — ONDANSETRON 4 MG: 4 TABLET, ORALLY DISINTEGRATING ORAL at 17:55

## 2020-01-27 RX ADMIN — PANTOPRAZOLE SODIUM 40 MG: 40 INJECTION, POWDER, FOR SOLUTION INTRAVENOUS at 23:30

## 2020-01-27 RX ADMIN — SODIUM CHLORIDE 1000 ML: 0.9 INJECTION, SOLUTION INTRAVENOUS at 18:58

## 2020-01-27 RX ADMIN — SODIUM CHLORIDE 100 ML/HR: 0.9 INJECTION, SOLUTION INTRAVENOUS at 23:00

## 2020-01-27 NOTE — ED PROVIDER NOTES
History  Chief Complaint   Patient presents with    Vomiting     pt arriving via ems  pt reports N/V/D, body aches, and weakness for a few weeks  Patient is a 78-year-old male presenting for vomiting and diarrheal episodes ongoing for several weeks  Patient is a poor historian  Patient states he has been vomiting for last several weeks, he states that he cannot hold any food or drink down  Patient states he will intermittently have what appears to be blood in his vomitus  Patient states he is also having watery diarrheal bowel movements several times a day  Patient states there are times when he has the urge to have a bowel movement however cannot make it to the bathroom in time and will defecate himself  Patient denies fevers states that he is always cold  He does not identify sick contacts  Per chart review patient has not been hospitalized recently  He did have a colonoscopy performed in December of 2019 where a sigmoid polyp was found and sent for pathology, otherwise it was a poor prep exam   Patient states he has not been able to take his medications due to these feelings, it is uncertain how long he has not been taking his medicines  Patient does have a history of diabetes, hypertension, hyperlipidemia, renal mass  Patient also states that he feels too weak to walk although denies falls or passing out events  Prior to Admission Medications   Prescriptions Last Dose Informant Patient Reported? Taking?    amLODIPine (NORVASC) 10 mg tablet   No No   Sig: Take 1 tablet (10 mg total) by mouth daily   atorvastatin (LIPITOR) 40 mg tablet   Yes No   Sig: Take 40 mg by mouth daily   busPIRone (BUSPAR) 5 mg tablet   Yes Yes   Sig: Take 5 mg by mouth 2 (two) times a day   citalopram (CeleXA) 40 mg tablet   Yes No   Sig: Take 40 mg by mouth daily   famotidine (PEPCID) 20 mg tablet   No No   Sig: Take 1 tablet (20 mg total) by mouth daily   gabapentin (NEURONTIN) 100 mg capsule   No No   Sig: Take 1 capsule (100 mg total) by mouth 2 (two) times a day   hydrochlorothiazide (HYDRODIURIL) 12 5 mg tablet   No No   Sig: Take 1 tablet (12 5 mg total) by mouth daily   insulin glargine (BASAGLAR KWIKPEN) 100 units/mL injection pen   No No   Sig: Inject 14 Units under the skin daily before breakfast   losartan (COZAAR) 50 mg tablet   Yes No   Sig: Take 25 mg by mouth daily   metFORMIN (GLUCOPHAGE) 1000 MG tablet   No No   Sig: Take 1 tablet (1,000 mg total) by mouth daily with breakfast   metoprolol tartrate (LOPRESSOR) 50 mg tablet   No No   Sig: Take 1 tablet (50 mg total) by mouth every 12 (twelve) hours   mirtazapine (REMERON) 15 mg tablet   No No   Sig: Take 1 tablet (15 mg total) by mouth daily at bedtime   naproxen (NAPROSYN) 500 mg tablet   No No   Sig: Take 1 tablet (500 mg total) by mouth 2 (two) times a day with meals   nystatin-triamcinolone (MYCOLOG-II) cream   No No   Sig: Apply topically 2 (two) times a day   pantoprazole (PROTONIX) 40 mg tablet   Yes No   Sig: Take 40 mg by mouth daily   rivaroxaban (XARELTO) 20 mg tablet   Yes Yes   Sig: Take 20 mg by mouth      Facility-Administered Medications: None       Past Medical History:   Diagnosis Date    Diabetes mellitus ([address] )     GERD (gastroesophageal reflux disease)     Hyperlipidemia     Hypertension     Migraine     Psychiatric disorder        Past Surgical History:   Procedure Laterality Date    ESOPHAGOGASTRODUODENOSCOPY N/A 9/7/2016    Procedure: ESOPHAGOGASTRODUODENOSCOPY (EGD); Surgeon: Nola Pagan MD;  Location: [facility]; Service:        History reviewed  No pertinent family history  I have reviewed and agree with the history as documented  Social History     Tobacco Use    Smoking status: Former Smoker    Smokeless tobacco: Never Used   Substance Use Topics    Alcohol use: No    Drug use: Not Currently     Types: Marijuana        Review of Systems   Constitutional: Negative for chills and fever     Gastrointestinal: Positive for abdominal pain, diarrhea, nausea and vomiting  Negative for blood in stool and constipation  Genitourinary: Negative for dysuria  Neurological: Positive for weakness  All other systems reviewed and are negative  Physical Exam  ED Triage Vitals [01/27/20 1659]   Temperature Pulse Respirations Blood Pressure SpO2   97 9 °F (36 6 °C) 73 17 130/71 100 %      Temp Source Heart Rate Source Patient Position - Orthostatic VS BP Location FiO2 (%)   Oral Monitor Lying Left arm --      Pain Score       --             Orthostatic Vital Signs  Vitals:    01/27/20 1659 01/27/20 1930 01/27/20 2148   BP: 130/71 100/55 156/82   Pulse: 73 96 104   Patient Position - Orthostatic VS: Lying Lying Lying       Physical Exam   Constitutional: He appears well-developed  Cachectic   HENT:   Head: Normocephalic and atraumatic  Nose: Nose normal    Dry mucus membranes   Eyes: Conjunctivae and EOM are normal  Right eye exhibits no discharge  Left eye exhibits no discharge  Pupils pinpoint   Cardiovascular: Normal rate, regular rhythm and normal heart sounds  Pulmonary/Chest: Effort normal and breath sounds normal  No stridor  No respiratory distress  He has no wheezes  He has no rales  Abdominal: Soft  He exhibits no distension  There is tenderness (generalized, L > R)  There is no rebound and no guarding  Musculoskeletal: He exhibits no edema or deformity  Neurological: He is alert  A sensory deficit (bilateral LE neuropathy, L foremarm neuropathy) is present  No cranial nerve deficit  He exhibits normal muscle tone  Skin: Skin is warm  No rash noted  He is not diaphoretic  Vitals reviewed        ED Medications  Medications   pantoprazole (PROTONIX) injection 40 mg (40 mg Intravenous Given 1/27/20 2330)   ondansetron (ZOFRAN) injection 4 mg (has no administration in time range)   sodium chloride 0 9 % infusion (100 mL/hr Intravenous New Bag 1/27/20 2300)   acetaminophen (TYLENOL) tablet 650 mg (has no administration in time range)   sodium chloride 0 9 % bolus 1,000 mL (0 mL Intravenous Stopped 1/27/20 2107)   ondansetron (ZOFRAN-ODT) dispersible tablet 4 mg (4 mg Oral Given 1/27/20 1755)       Diagnostic Studies  Results Reviewed     Procedure Component Value Units Date/Time    Protime-INR [[id number]]  (Normal) Collected:  01/27/20 2300    Lab Status:  Final result Specimen:  Blood from Arm, Left Updated:  01/27/20 2325     Protime 13 0 seconds      INR 0 97    APTT [[id number]]  (Normal) Collected:  01/27/20 2300    Lab Status:  Final result Specimen:  Blood from Arm, Left Updated:  01/27/20 2325     PTT 23 seconds     Heparin level [[id number]] Collected:  01/27/20 2300    Lab Status: In process Specimen:  Blood from Arm, Left Updated:  01/27/20 2305    Occult blood gastric / duodenum [[id number]]     Lab Status:  No result Specimen:   Body Fluid     POCT urinalysis dipstick [[id number]]     Lab Status:  No result Specimen:  Urine     Comprehensive metabolic panel [[id number]]  (Abnormal) Collected:  01/27/20 1854    Lab Status:  Final result Specimen:  Blood from Arm, Left Updated:  01/27/20 1921     Sodium 141 mmol/L      Potassium 3 1 mmol/L      Chloride 95 mmol/L      CO2 28 mmol/L      ANION GAP 18 mmol/L      BUN 40 mg/dL      Creatinine 3 20 mg/dL      Glucose 162 mg/dL      Calcium 9 7 mg/dL      AST 53 U/L      ALT 76 U/L      Alkaline Phosphatase 112 U/L      Total Protein 8 8 g/dL      Albumin 4 2 g/dL      Total Bilirubin 0 74 mg/dL      eGFR 24 ml/min/1 73sq m     Narrative:       National Kidney Disease Foundation guidelines for Chronic Kidney Disease (CKD):     Stage 1 with normal or high GFR (GFR > 90 mL/min/1 73 square meters)    Stage 2 Mild CKD (GFR = 60-89 mL/min/1 73 square meters)    Stage 3A Moderate CKD (GFR = 45-59 mL/min/1 73 square meters)    Stage 3B Moderate CKD (GFR = 30-44 mL/min/1 73 square meters)    Stage 4 Severe CKD (GFR = 15-29 mL/min/1 73 square meters)    Stage 5 End Stage CKD (GFR <15 mL/min/1 73 square meters)  Note: GFR calculation is accurate only with a steady state creatinine    Lipase [[id number]]  (Normal) Collected:  01/27/20 1854    Lab Status:  Final result Specimen:  Blood from Arm, Left Updated:  01/27/20 1921     Lipase 283 u/L     CBC and differential [[id number]]  (Abnormal) Collected:  01/27/20 1854    Lab Status:  Final result Specimen:  Blood from Arm, Left Updated:  01/27/20 1907     WBC 6 49 Thousand/uL      RBC 3 70 Million/uL      Hemoglobin 11 9 g/dL      Hematocrit 36 9 %       fL      MCH 32 2 pg      MCHC 32 2 g/dL      RDW 13 9 %      MPV 11 9 fL      Platelets 216 Thousands/uL      nRBC 0 /100 WBCs      Neutrophils Relative 75 %      Immat GRANS % 0 %      Lymphocytes Relative 18 %      Monocytes Relative 6 %      Eosinophils Relative 0 %      Basophils Relative 1 %      Neutrophils Absolute 4 87 Thousands/µL      Immature Grans Absolute 0 02 Thousand/uL      Lymphocytes Absolute 1 14 Thousands/µL      Monocytes Absolute 0 39 Thousand/µL      Eosinophils Absolute 0 01 Thousand/µL      Basophils Absolute 0 06 Thousands/µL     Fingerstick Glucose (POCT) [[id number]]  (Abnormal) Collected:  01/27/20 1739    Lab Status:  Final result Updated:  01/27/20 1740     POC Glucose 151 mg/dl     Clostridium difficile toxin by PCR with EIA [[id number]]     Lab Status:  No result Specimen:  Stool     Stool Enteric Bacterial Panel by PCR [[id number]]     Lab Status:  No result Specimen:  Stool                  CT head wo contrast   Final Result by Navya Rust MD (01/27 2303)      No acute intracranial abnormality  Workstation performed: [id number]         CT abdomen pelvis wo contrast   Final Result by Bev Blake MD (01/27 2006)       Fluid-filled stomach and proximal small bowel with mild gastric and proximal small bowel wall thickening in keeping with a nonspecific gastritis/enteritis  No bowel obstruction or bowel pneumatosis        Mild circumferential bladder wall thickening possibly indicating a cystitis  Moderate quantity of air within the bladder may be secondary to recent catheterization  The study was marked in [facility] for immediate notification  Workstation performed: [id number]               Procedures  Procedures      ED Course  ED Course as of Jan 28 0049   Mon Jan 27, 2020 1926 Stable   Hemoglobin(!): 11 9 1927 122, year ago; 80 in 11/19   eGFR: 24 1927 Potassium(!): 3 1   1927 0 73, year ago; 1 00 in 11/19   Creatinine(!): 3 20 1946 Spoke to daughter regarding patient's evaluation, per patient request  Daughter states pt has left kidney tumor and was seen in November for evaluation  She is uncertain when pt started with n/v/d  MDM  Number of Diagnoses or Management Options  Abnormal CT scan, bladder:   [place] (acute kidney injury) ([facility] ):   Diarrhea:   Nausea and vomiting:   Diagnosis management comments: Patient admitted to [facility] service for [place], pneumobladder         Amount and/or Complexity of Data Reviewed  Decide to obtain previous medical records or to obtain history from someone other than the patient: yes          Disposition  Final diagnoses:   Nausea and vomiting   Diarrhea   [place] (acute kidney injury) ([facility] )   Abnormal CT scan, bladder     Time reflects when diagnosis was documented in both MDM as applicable and the Disposition within this note     Time User Action Codes Description Comment    1/27/2020  8:36 PM Kyle Antonio Add [R11 2] Nausea and vomiting     1/27/2020  8:36 PM Raenelle Mabelvale [R19 7] Diarrhea     1/27/2020  8:36 PM Raenelle Mabelvale [N17 9] [place] (acute kidney injury) ([facility] )     1/27/2020  8:37 PM Kyle Antonio Add [R93 41] Abnormal CT scan, bladder     1/27/2020  8:51 PM PigerCamilo Modify [N17 9] [place] (acute kidney injury) ([facility] )     1/27/2020  8:51 PM Tracy Turner Add [R39 89] Pneumaturia     1/27/2020  8:52 PM PigerCamilo Modify [N17 9] [place] (acute kidney injury) ([facility] [address] )     1/27/2020  9:38 PM Piger Yeceniagarcía Brock Add [E46] Malnutrition [facility])       ED Disposition     ED Disposition Condition Date/Time Comment    Admit Stable Mon Jan 27, 2020  8:36 PM Case was discussed with OLIVIER and the patient's admission status was agreed to be Admission Status: inpatient status to the service of Dr Pedro Dakins           Follow-up Information    None         Current Discharge Medication List      CONTINUE these medications which have NOT CHANGED    Details   busPIRone (BUSPAR) 5 mg tablet Take 5 mg by mouth 2 (two) times a day      rivaroxaban (XARELTO) 20 mg tablet Take 20 mg by mouth      amLODIPine (NORVASC) 10 mg tablet Take 1 tablet (10 mg total) by mouth daily  Qty: 30 tablet, Refills: 0    Associated Diagnoses: Accelerated hypertension      atorvastatin (LIPITOR) 40 mg tablet Take 40 mg by mouth daily      citalopram (CeleXA) 40 mg tablet Take 40 mg by mouth daily      famotidine (PEPCID) 20 mg tablet Take 1 tablet (20 mg total) by mouth daily  Qty: 30 tablet, Refills: 0    Associated Diagnoses: Elevated lipase      gabapentin (NEURONTIN) 100 mg capsule Take 1 capsule (100 mg total) by mouth 2 (two) times a day  Qty: 60 capsule, Refills: 0    Associated Diagnoses: 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 2 diabetes mellitus (HCC)      hydrochlorothiazide (HYDRODIURIL) 12 5 mg tablet Take 1 tablet (12 5 mg total) by mouth daily  Qty: 30 tablet, Refills: 0    Associated Diagnoses: Accelerated hypertension      insulin glargine (BASAGLAR KWIKPEN) 100 units/mL injection pen Inject 14 Units under the skin daily before breakfast  Qty: 5 pen, Refills: 0    Comments: Please also dispense a box of pen needles  Associated Diagnoses: Ambulatory dysfunction      losartan (COZAAR) 50 mg tablet Take 25 mg by mouth daily      metFORMIN (GLUCOPHAGE) 1000 MG tablet Take 1 tablet (1,000 mg total) by mouth daily with breakfast  Qty: 30 tablet, Refills: 1    Associated Diagnoses: Type 2 diabetes mellitus with hyperglycemia, without long-term current use of insulin (HCC)      metoprolol tartrate (LOPRESSOR) 50 mg tablet Take 1 tablet (50 mg total) by mouth every 12 (twelve) hours  Qty: 60 tablet, Refills: 0    Associated Diagnoses: Accelerated hypertension      mirtazapine (REMERON) 15 mg tablet Take 1 tablet (15 mg total) by mouth daily at bedtime  Qty: 30 tablet, Refills: 0    Associated Diagnoses: Generalized weakness      naproxen (NAPROSYN) 500 mg tablet Take 1 tablet (500 mg total) by mouth 2 (two) times a day with meals  Qty: 30 tablet, Refills: 0    Associated Diagnoses: Bilateral knee pain      nystatin-triamcinolone (MYCOLOG-II) cream Apply topically 2 (two) times a day  Qty: 30 g, Refills: 0    Associated Diagnoses: Fungal infection of skin      pantoprazole (PROTONIX) 40 mg tablet Take 40 mg by mouth daily           No discharge procedures on file  ED Provider  Attending physically available and evaluated Leslie Razo I managed the patient along with the ED Attending      Electronically Signed by         Dickson Royal DO  01/28/20 0058

## 2020-01-27 NOTE — ED ATTENDING ATTESTATION
1/27/2020  I, Patience Padron MD, saw and evaluated the patient  I have discussed the patient with the resident/non-physician practitioner and agree with the resident's/non-physician practitioner's findings, Plan of Care, and MDM as documented in the resident's/non-physician practitioner's note, except where noted  All available labs and Radiology studies were reviewed  I was present for key portions of any procedure(s) performed by the resident/non-physician practitioner and I was immediately available to provide assistance  At this point I agree with the current assessment done in the Emergency Department  I have conducted an independent evaluation of this patient a history and physical is as follows:  Patient with nausea, vomiting and diarrhea, going on for couple of weeks, denies fever, no blood in vomiting or diarrhea  On exam, patient is nauseas, vomiting, asking for water, Abd soft, mild abdominal tenderness diffusely  Differential diagnosis: Gastroenteritis, viral GI illness, pancreatitis, rule out acute abdomen, dehydration  We will check labs, CT A/P, give IVF and Zofran  ED Course     ER workup pending  Case discussed in sign out with Dr Maria M Centeno; patient would be re-evaluated by ER Resident after workup results and dispo accordingly      Critical Care Time  Procedures

## 2020-01-27 NOTE — ED NOTES
Attempted to place IV twice without success  Will have another RN try       Marke Kaba, MARION  01/27/20 9118

## 2020-01-28 ENCOUNTER — APPOINTMENT (INPATIENT)
Dept: CT IMAGING | Facility: HOSPITAL | Age: 55
DRG: 469 | End: 2020-01-28
Payer: COMMERCIAL

## 2020-01-28 PROBLEM — Z86.711 HISTORY OF PULMONARY EMBOLUS (PE): Status: ACTIVE | Noted: 2020-01-28

## 2020-01-28 PROBLEM — D64.9 ANEMIA: Status: ACTIVE | Noted: 2020-01-28

## 2020-01-28 LAB
ALBUMIN SERPL BCP-MCNC: 3.7 G/DL (ref 3.5–5)
ALP SERPL-CCNC: 93 U/L (ref 46–116)
ALT SERPL W P-5'-P-CCNC: 65 U/L (ref 12–78)
ANION GAP SERPL CALCULATED.3IONS-SCNC: 17 MMOL/L (ref 4–13)
AST SERPL W P-5'-P-CCNC: 44 U/L (ref 5–45)
BILIRUB SERPL-MCNC: 0.65 MG/DL (ref 0.2–1)
BUN SERPL-MCNC: 41 MG/DL (ref 5–25)
CALCIUM SERPL-MCNC: 8.7 MG/DL (ref 8.3–10.1)
CHLORIDE SERPL-SCNC: 96 MMOL/L (ref 100–108)
CO2 SERPL-SCNC: 29 MMOL/L (ref 21–32)
CREAT SERPL-MCNC: 2.87 MG/DL (ref 0.6–1.3)
ERYTHROCYTE [DISTWIDTH] IN BLOOD BY AUTOMATED COUNT: 13.9 % (ref 11.6–15.1)
GFR SERPL CREATININE-BSD FRML MDRD: 27 ML/MIN/1.73SQ M
GLUCOSE SERPL-MCNC: 103 MG/DL (ref 65–140)
GLUCOSE SERPL-MCNC: 104 MG/DL (ref 65–140)
GLUCOSE SERPL-MCNC: 106 MG/DL (ref 65–140)
GLUCOSE SERPL-MCNC: 147 MG/DL (ref 65–140)
GLUCOSE SERPL-MCNC: 269 MG/DL (ref 65–140)
HCT VFR BLD AUTO: 32.7 % (ref 36.5–49.3)
HEPARIN CF II AG PPP IA-MCNC: <0.1 IU/ML
HGB BLD-MCNC: 10.4 G/DL (ref 12–17)
MAGNESIUM SERPL-MCNC: 2.2 MG/DL (ref 1.6–2.6)
MCH RBC QN AUTO: 31.8 PG (ref 26.8–34.3)
MCHC RBC AUTO-ENTMCNC: 31.8 G/DL (ref 31.4–37.4)
MCV RBC AUTO: 100 FL (ref 82–98)
PHOSPHATE SERPL-MCNC: 5.6 MG/DL (ref 2.7–4.5)
PLATELET # BLD AUTO: 215 THOUSANDS/UL (ref 149–390)
PMV BLD AUTO: 12.8 FL (ref 8.9–12.7)
POTASSIUM SERPL-SCNC: 3.2 MMOL/L (ref 3.5–5.3)
PROT SERPL-MCNC: 7.5 G/DL (ref 6.4–8.2)
RBC # BLD AUTO: 3.27 MILLION/UL (ref 3.88–5.62)
SODIUM SERPL-SCNC: 142 MMOL/L (ref 136–145)
TSH SERPL DL<=0.05 MIU/L-ACNC: 1.29 UIU/ML (ref 0.36–3.74)
WBC # BLD AUTO: 6.41 THOUSAND/UL (ref 4.31–10.16)

## 2020-01-28 PROCEDURE — 84100 ASSAY OF PHOSPHORUS: CPT | Performed by: PHYSICIAN ASSISTANT

## 2020-01-28 PROCEDURE — 97116 GAIT TRAINING THERAPY: CPT

## 2020-01-28 PROCEDURE — 97167 OT EVAL HIGH COMPLEX 60 MIN: CPT

## 2020-01-28 PROCEDURE — 85027 COMPLETE CBC AUTOMATED: CPT | Performed by: PHYSICIAN ASSISTANT

## 2020-01-28 PROCEDURE — 80053 COMPREHEN METABOLIC PANEL: CPT | Performed by: PHYSICIAN ASSISTANT

## 2020-01-28 PROCEDURE — 84443 ASSAY THYROID STIM HORMONE: CPT | Performed by: PHYSICIAN ASSISTANT

## 2020-01-28 PROCEDURE — 87493 C DIFF AMPLIFIED PROBE: CPT | Performed by: PHYSICIAN ASSISTANT

## 2020-01-28 PROCEDURE — 99223 1ST HOSP IP/OBS HIGH 75: CPT | Performed by: UROLOGY

## 2020-01-28 PROCEDURE — C9113 INJ PANTOPRAZOLE SODIUM, VIA: HCPCS | Performed by: PHYSICIAN ASSISTANT

## 2020-01-28 PROCEDURE — 72194 CT PELVIS W/O & W/DYE: CPT

## 2020-01-28 PROCEDURE — 82948 REAGENT STRIP/BLOOD GLUCOSE: CPT

## 2020-01-28 PROCEDURE — 97163 PT EVAL HIGH COMPLEX 45 MIN: CPT

## 2020-01-28 PROCEDURE — 87505 NFCT AGENT DETECTION GI: CPT | Performed by: PHYSICIAN ASSISTANT

## 2020-01-28 PROCEDURE — 99232 SBSQ HOSP IP/OBS MODERATE 35: CPT | Performed by: PHYSICIAN ASSISTANT

## 2020-01-28 PROCEDURE — 83735 ASSAY OF MAGNESIUM: CPT | Performed by: PHYSICIAN ASSISTANT

## 2020-01-28 RX ORDER — AMLODIPINE BESYLATE 10 MG/1
10 TABLET ORAL DAILY
Status: DISCONTINUED | OUTPATIENT
Start: 2020-01-28 | End: 2020-02-01 | Stop reason: HOSPADM

## 2020-01-28 RX ORDER — FAMOTIDINE 20 MG/1
20 TABLET, FILM COATED ORAL DAILY
Status: DISCONTINUED | OUTPATIENT
Start: 2020-01-28 | End: 2020-02-01 | Stop reason: HOSPADM

## 2020-01-28 RX ORDER — CITALOPRAM 20 MG/1
40 TABLET ORAL DAILY
Status: DISCONTINUED | OUTPATIENT
Start: 2020-01-28 | End: 2020-02-01 | Stop reason: HOSPADM

## 2020-01-28 RX ORDER — POTASSIUM CHLORIDE 20 MEQ/1
40 TABLET, EXTENDED RELEASE ORAL 2 TIMES DAILY
Status: COMPLETED | OUTPATIENT
Start: 2020-01-28 | End: 2020-01-29

## 2020-01-28 RX ORDER — ATORVASTATIN CALCIUM 40 MG/1
40 TABLET, FILM COATED ORAL
Status: DISCONTINUED | OUTPATIENT
Start: 2020-01-28 | End: 2020-02-01 | Stop reason: HOSPADM

## 2020-01-28 RX ADMIN — CITALOPRAM HYDROBROMIDE 40 MG: 20 TABLET ORAL at 16:05

## 2020-01-28 RX ADMIN — ATORVASTATIN CALCIUM 40 MG: 40 TABLET, FILM COATED ORAL at 16:05

## 2020-01-28 RX ADMIN — SODIUM CHLORIDE 100 ML/HR: 0.9 INJECTION, SOLUTION INTRAVENOUS at 09:43

## 2020-01-28 RX ADMIN — POTASSIUM CHLORIDE 40 MEQ: 1500 TABLET, EXTENDED RELEASE ORAL at 09:43

## 2020-01-28 RX ADMIN — INSULIN LISPRO 2 UNITS: 100 INJECTION, SOLUTION INTRAVENOUS; SUBCUTANEOUS at 22:17

## 2020-01-28 RX ADMIN — FAMOTIDINE 20 MG: 20 TABLET, FILM COATED ORAL at 16:06

## 2020-01-28 RX ADMIN — PANTOPRAZOLE SODIUM 40 MG: 40 INJECTION, POWDER, FOR SOLUTION INTRAVENOUS at 09:43

## 2020-01-28 RX ADMIN — AMLODIPINE BESYLATE 10 MG: 10 TABLET ORAL at 16:06

## 2020-01-28 RX ADMIN — PANTOPRAZOLE SODIUM 40 MG: 40 INJECTION, POWDER, FOR SOLUTION INTRAVENOUS at 21:59

## 2020-01-28 RX ADMIN — IOHEXOL 15 ML: 350 INJECTION, SOLUTION INTRAVENOUS at 20:26

## 2020-01-28 RX ADMIN — POTASSIUM CHLORIDE 40 MEQ: 1500 TABLET, EXTENDED RELEASE ORAL at 17:34

## 2020-01-28 NOTE — ASSESSMENT & PLAN NOTE
Lab Results   Component Value Date    HGBA1C 12 8 (H) 08/11/2018     Recent Labs     01/27/20  1739 01/27/20  2257 01/28/20  0555 01/28/20  1131   POCGLU 151* 165* 106 104     Recheck A1c here  Reports taking lantus 20 units in the morning  NPO given possible GI bleed with vomiting  Blood sugar checks q6 while NPO    Change to q meal when diet is advanced

## 2020-01-28 NOTE — CONSULTS
Consult - Urology   Narkakylie Mendosa 1965, 62 y o  male MRN: [id number]    Unit/Bed#: E2 -01 Encounter: [id number]    [place] (acute kidney injury) ([facility] [address] )  Assessment & Plan  Acute kidney injury with an elevated creatinine up to 3 2 on admission, down to 2 87 with hydration  Continue hydration  Continue retention protocol  Does not appear to be retaining on my exam or bladder scan today p o  * Pneumaturia  Assessment & Plan  With concern for colovesicular fistula secondary to a air in his bladder without recent instrumentation  Will order CT cystogram to evaluate for that  Continue to follow retention protocol  Bedside rounds performed with Adelina Peace RN  Discussed with Dr Megha Romeo and Fariba Thacker PA-C with [facility]  Subjective/Objective     Subjective:   CC: "I just keep peeing air "  HPI:  Mr Marina Goldstein reports that approximately 1 month ago he underwent elective screening Colonoscopy at an outside facility  Days after that he began to experience pneumaturia  He denies any history of diverticulitis, chronic abdominal pain, infection or colonic malignancy  Denies any previous urologic history with surgery on his prostate or his bladder  He does note that he had significant pneumaturia without noting feculent material in his urine  He denies any dysuria, hematuria  No abdominal pain he did have nausea and vomiting with diarrhea which was quite liquidy  He is unsure if this started before after the Colonoscopy  He seems to be unsure about a lot of his history in the timing of events  History is supplemented with information from his daughter, present at the bedside per my history and exam     He denies any bloody stool  Noted is a polypectomy performed during the Colonoscopy  ROS:  Review of Systems   Constitutional: Negative for activity change and appetite change  HENT: Negative for congestion and ear pain  Eyes: Negative for pain     Respiratory: Negative for cough and shortness of breath  Cardiovascular: Negative for chest pain and palpitations  Gastrointestinal: Negative for abdominal distention, abdominal pain, blood in stool, constipation, diarrhea and nausea  Genitourinary: Negative for difficulty urinating and dysuria  Musculoskeletal: Negative for arthralgias and myalgias  Skin: Negative for rash  Allergic/Immunologic: Negative for immunocompromised state  Neurological: Negative for dizziness and headaches  Hematological: Negative for adenopathy  Does not bruise/bleed easily  Psychiatric/Behavioral: Negative for agitation  The patient is not nervous/anxious  Objective:  Vitals: Blood pressure 141/70, pulse 86, temperature (!) 96 °F (35 6 °C), temperature source Tympanic, resp  rate 16, height 5' 6" (1 676 m), weight 49 6 kg (109 lb 5 6 oz), SpO2 100 %  ,Body mass index is 17 65 kg/m²  Intake/Output Summary (Last 24 hours) at 1/28/2020 1624  Last data filed at 1/28/2020 0654  Gross per 24 hour   Intake 1000 ml   Output 150 ml   Net 850 ml       Invasive Devices     Peripheral Intravenous Line            Peripheral IV 01/27/20 Left Hand less than 1 day                Physical Exam   Constitutional: He is oriented to person, place, and time  He appears well-developed and well-nourished  He is cooperative  He does not appear ill  No distress  Pleasant well-appearing 78-year-old man, appears intermittently confused and is definitely a poor historian  Sitting upright in bed  Chatty   HENT:   Head: Normocephalic and atraumatic  Moist mucous membranes  Eyes: Conjunctivae and EOM are normal    Neck: Normal range of motion  Neck supple  No tracheal deviation present  Cardiovascular: Normal rate, regular rhythm and normal heart sounds  No murmur heard  Pulmonary/Chest: Effort normal and breath sounds normal  No respiratory distress  He has no wheezes  Good airflow bilaterally on deep inspiration  Abdominal: Soft   Bowel sounds are normal  He exhibits no distension and no mass  There is no tenderness  Abdomen is thin soft and benign  Bladder scan performed for 63 mL  No suprapubic tenderness, no rigidity rebound or guarding  No peritoneal signs  Musculoskeletal: Normal range of motion  He exhibits no edema  Neurological: He is alert and oriented to person, place, and time  Skin: Skin is warm and dry  No rash noted  He is not diaphoretic  No erythema  No pallor  Psychiatric: He has a normal mood and affect  His behavior is normal  Judgment and thought content normal    Nursing note and vitals reviewed  History:    Past Medical History:   Diagnosis Date    Diabetes mellitus ([address] )     GERD (gastroesophageal reflux disease)     Hyperlipidemia     Hypertension     Migraine     Psychiatric disorder      Past Surgical History:   Procedure Laterality Date    ESOPHAGOGASTRODUODENOSCOPY N/A 9/7/2016    Procedure: ESOPHAGOGASTRODUODENOSCOPY (EGD); Surgeon: Kristen Walter MD;  Location: [facility]; Service:      History reviewed  No pertinent family history    Social History     Socioeconomic History    Marital status: Single     Spouse name: None    Number of children: None    Years of education: None    Highest education level: None   Occupational History    None   Social Needs    Financial resource strain: None    Food insecurity:     Worry: None     Inability: None    Transportation needs:     Medical: None     Non-medical: None   Tobacco Use    Smoking status: Former Smoker    Smokeless tobacco: Never Used   Substance and Sexual Activity    Alcohol use: No    Drug use: Not Currently     Types: Marijuana    Sexual activity: None   Lifestyle    Physical activity:     Days per week: None     Minutes per session: None    Stress: None   Relationships    Social connections:     Talks on phone: None     Gets together: None     Attends [employer] service: None     Active member of club or organization: None     Attends meetings of clubs or organizations: None     Relationship status: None    Intimate partner violence:     Fear of current or ex partner: None     Emotionally abused: None     Physically abused: None     Forced sexual activity: None   Other Topics Concern    None   Social History Narrative    None       Imaging:  CT with air in the bladder  Wall thickening consistent with cystitis  Imaging reviewed - both report and images personally reviewed  Lab Results:  I have personally reviewed pertinent labs    Results from last 7 days   Lab Units 01/28/20  0440 01/27/20  1854   WBC Thousand/uL 6 41 6 49   HEMOGLOBIN g/dL 10 4* 11 9*   PLATELETS Thousands/uL 215 263     Results from last 7 days   Lab Units 01/28/20  0440 01/27/20  1854   SODIUM mmol/L 142 141   POTASSIUM mmol/L 3 2* 3 1*   CHLORIDE mmol/L 96* 95*   CO2 mmol/L 29 28   BUN mg/dL 41* 40*   CREATININE mg/dL 2 87* 3 20*   EGFR ml/min/1 73sq m 27 24   CALCIUM mg/dL 8 7 9 7   AST U/L 44 53*   ALT U/L 65 76   ALK PHOS U/L 93 112               Ronak Castano PA-C  Date: 1/28/2020 Time: 4:24 PM

## 2020-01-28 NOTE — ASSESSMENT & PLAN NOTE
CT abdomen pelvis with mild circumferential bladder wall thickening possibly indicating cystitis  Moderate quantity of air within the bladder  However no recent instrumentation or Lemus insertion has been performed  Patient reports passing air when attempting to urinate  This will be followed by few drips of urine  Recent colonoscopy performed on 12/5/19 for malignancy screening at [facility]  Report with a sigmoid polyp removed and poor prep  Patient reports symptoms starting after that but difficult to obtain specific time frame  Question if there is a ongoing fistula  Will have Urology evaluate

## 2020-01-28 NOTE — ASSESSMENT & PLAN NOTE
Lab Results   Component Value Date    HGBA1C 12 8 (H) 08/11/2018     Recent Labs     01/27/20  1739 01/27/20  2257 01/28/20  0555 01/28/20  1131   POCGLU 151* 165* 106 104       Blood Sugar Average: Last 72 hrs:  (P) 131 5

## 2020-01-28 NOTE — ASSESSMENT & PLAN NOTE
Patient unable to provide any information regarding  According to records at [facility] patient's anticoagulated on Xarelto  Review of records reveal acute PE on CT chest 12/23/18  Xarelto held given concern for possible blood in vomit   Unclear if pt is even taking blood thinner at home as he does have hx of non compliance with meds

## 2020-01-28 NOTE — PLAN OF CARE
Problem: Potential for Falls  Goal: Patient will remain free of falls  Description  INTERVENTIONS:  - Assess patient frequently for physical needs  -  Identify cognitive and physical deficits and behaviors that affect risk of falls  -  [place] fall precautions as indicated by assessment   - Educate patient/family on patient safety including physical limitations  - Instruct patient to call for assistance with activity based on assessment  - Modify environment to reduce risk of injury  - Consider OT/PT consult to assist with strengthening/mobility  Outcome: Progressing     Problem: Nutrition/Hydration-ADULT  Goal: Nutrient/Hydration intake appropriate for improving, restoring or maintaining nutritional needs  Description  Monitor and assess patient's nutrition/hydration status for malnutrition  Collaborate with interdisciplinary team and initiate plan and interventions as ordered  Monitor patient's weight and dietary intake as ordered or per policy  Utilize nutrition screening tool and intervene as necessary  Determine patient's food preferences and provide high-protein, high-caloric foods as appropriate       INTERVENTIONS:  - Monitor oral intake, urinary output, labs, and treatment plans  - Assess nutrition and hydration status and recommend course of action  - Evaluate amount of meals eaten  - Assist patient with eating if necessary   - Allow adequate time for meals  - Recommend/ encourage appropriate diets, oral nutritional supplements, and vitamin/mineral supplements  - Order, calculate, and assess calorie counts as needed  - Recommend, monitor, and adjust tube feedings and TPN/PPN based on assessed needs  - Assess need for intravenous fluids  - Provide specific nutrition/hydration education as appropriate  - Include patient/family/caregiver in decisions related to nutrition  Outcome: Progressing     Problem: GASTROINTESTINAL - ADULT  Goal: Minimal or absence of nausea and/or vomiting  Description  INTERVENTIONS:  - Administer IV fluids if ordered to ensure adequate hydration  - Maintain NPO status until nausea and vomiting are resolved  - Nasogastric tube if ordered  - Administer ordered antiemetic medications as needed  - Provide nonpharmacologic comfort measures as appropriate  - Advance diet as tolerated, if ordered  - Consider nutrition services referral to assist patient with adequate nutrition and appropriate food choices  Outcome: Progressing  Goal: Maintains or returns to baseline bowel function  Description  INTERVENTIONS:  - Assess bowel function  - Encourage oral fluids to ensure adequate hydration  - Administer IV fluids if ordered to ensure adequate hydration  - Administer ordered medications as needed  - Encourage mobilization and activity  - Consider nutritional services referral to assist patient with adequate nutrition and appropriate food choices  Outcome: Progressing  Goal: Maintains adequate nutritional intake  Description  INTERVENTIONS:  - Monitor percentage of each meal consumed  - Identify factors contributing to decreased intake, treat as appropriate  - Assist with meals as needed  - Monitor I&O, weight, and lab values if indicated  - Obtain nutrition services referral as needed  Outcome: Progressing  Goal: Establish and maintain optimal ostomy function  Description  INTERVENTIONS:  - Assess bowel function  - Encourage oral fluids to ensure adequate hydration  - Administer IV fluids if ordered to ensure adequate hydration   - Administer ordered medications as needed  - Encourage mobilization and activity  - Nutrition services referral to assist patient with appropriate food choices  - Assess stoma site  - Consider wound care consult   Outcome: Progressing     Problem: METABOLIC, FLUID AND ELECTROLYTES - ADULT  Goal: Electrolytes maintained within normal limits  Description  INTERVENTIONS:  - Monitor labs and assess patient for signs and symptoms of electrolyte imbalances  - Administer electrolyte replacement as ordered  - Monitor response to electrolyte replacements, including repeat lab results as appropriate  - Instruct patient on fluid and nutrition as appropriate  Outcome: Progressing  Goal: Fluid balance maintained  Description  INTERVENTIONS:  - Monitor labs   - Monitor I/O and WT  - Instruct patient on fluid and nutrition as appropriate  - Assess for signs & symptoms of volume excess or deficit  Outcome: Progressing  Goal: Glucose maintained within target range  Description  INTERVENTIONS:  - Monitor Blood Glucose as ordered  - Assess for signs and symptoms of hyperglycemia and hypoglycemia  - Administer ordered medications to maintain glucose within target range  - Assess nutritional intake and initiate nutrition service referral as needed  Outcome: Progressing     Problem: MUSCULOSKELETAL - ADULT  Goal: Maintain or return mobility to safest level of function  Description  INTERVENTIONS:  - Assess patient's ability to carry out ADLs; assess patient's baseline for ADL function and identify physical deficits which impact ability to perform ADLs (bathing, care of mouth/teeth, toileting, grooming, dressing, etc )  - Assess/evaluate cause of self-care deficits   - Assess range of motion  - Assess patient's mobility  - Assess patient's need for assistive devices and provide as appropriate  - Encourage maximum independence but intervene and supervise when necessary  - Involve family in performance of ADLs  - Assess for home care needs following discharge   - Consider OT consult to assist with ADL evaluation and planning for discharge  - Provide patient education as appropriate  Outcome: Progressing  Goal: Maintain proper alignment of affected body part  Description  INTERVENTIONS:  - Support, maintain and protect limb and body alignment  - Provide patient/ family with appropriate education  Outcome: Progressing

## 2020-01-28 NOTE — PLAN OF CARE
Problem: PHYSICAL THERAPY ADULT  Goal: Performs mobility at highest level of function for planned discharge setting  See evaluation for individualized goals  Description  Treatment/Interventions: Functional transfer training, LE strengthening/ROM, Therapeutic exercise, Endurance training, Patient/family training, Bed mobility, Gait training, Spoke to nursing, OT  Equipment Recommended: Walker(RW)       See flowsheet documentation for full assessment, interventions and recommendations  Outcome: Progressing  Note:   Prognosis: Good  Problem List: Decreased strength, Decreased endurance, Impaired balance, Decreased mobility, Impaired vision, Impaired sensation  Assessment: Pt  54 y o male w/ recently diagnosed w/ renal cell CA, presented w/ c/o nausea, vomiting, diarrhea for the past month  Pt also reports of falls,  dizziness & passing air through his urethra  Pt admitted for AFRICA (acute kidney injury) [facility]) w/ gastritis/ enteritis, pneumaturia & s/p falls at home  Pt referred to PT for mobility assessment & D/C planning w/ orders of up & OOB as tolerated  PTA, pt reports being I w/ overall ADLs & functional mobility w/ use of SPC PRN  On eval, pt demonstrate dec mobility, balance, endurance & amb  Pt require S for bed mobility & transfers however require minAx1 for amb w/o AD + cues for techniques  Gait deviations as above, slow w/ dec foot clearance & strides but no gross LOB noted  No dizziness reported t/o session  Nsg staff most recent vital signs as follows: /63 (BP Location: Right arm)   Pulse 84   Temp (!) 97 4 °F (36 3 °C) (Tympanic)   Resp 16   Ht 5' 6" (1 676 m)   Wt 49 6 kg (109 lb 5 6 oz)   SpO2 100%   BMI 17 65 kg/m²   Of note, pt tolerated further amb to trial RW, please see progress note below for details  At end of session, pt tolerated OOB in chair w/o issues, call bell & phone in reach  Fall precautions reinforced w/ good understanding   Pt functioning below baseline hence will continue skilled PT to improve function & safety  At this time, will anticipate good progress in PT for safe D/C to home  Will recommend HHPT, RW & family support at D/C  CM to follow  Nsg staff to continue to mobilized pt (OOB in chair for all meals & ambulate in room/unit) as tolerated to prevent further decline in function  Nsg notified  Barriers to Discharge: Decreased caregiver support  Barriers to Discharge Comments: pt home alone  Recommendation: Home PT, Home with family support          See flowsheet documentation for full assessment

## 2020-01-28 NOTE — ASSESSMENT & PLAN NOTE
Presenting with a Cr of 3 20  Baseline appears to be around 0 5-0 7  Likely secondary to dehydration give recent N/V/D  Received 1 L fluid bolus in ED     Continue with IV fluid hydration at 100 ml/hr  Recheck Cr today now 3 20--> 2 87  If Cr does not continue to improve will consult nephrology

## 2020-01-28 NOTE — ASSESSMENT & PLAN NOTE
Acute kidney injury with an elevated creatinine up to 3 2 on admission, down to normal with hydration and bladder decompression  There was no evidence of retention on bladder scans or CT scan prior to Lemus placement  No hydronephrosis  Fully draining clear yellow urine  Was placed for charted retention but I do not see a volume for which was placed  Consider void trial tomorrow or as an outpatient early next week

## 2020-01-28 NOTE — ASSESSMENT & PLAN NOTE
Presenting with a Cr of 3 20  Baseline appears to be around 0 5-0 7  Likely secondary to dehydration give recent N/V/D  Received 1 L fluid bolus in ED     Continue with IV fluid hydration at 100 ml/hr

## 2020-01-28 NOTE — PLAN OF CARE
Problem: OCCUPATIONAL THERAPY ADULT  Goal: Performs self-care activities at highest level of function for planned discharge setting  See evaluation for individualized goals  Description  Treatment Interventions: ADL retraining, Functional transfer training, UE strengthening/ROM, Endurance training, Patient/family training, Equipment evaluation/education, Compensatory technique education          See flowsheet documentation for full assessment, interventions and recommendations  Note:   Limitation: Decreased ADL status, Decreased UE strength, Decreased cognition, Decreased endurance, Decreased high-level ADLs  Prognosis: Good  Assessment: Pt is a 55y/o female admitted to the hospital 2* symptoms of nausea, vomiting, diarrhea, and decreased PO intake  Pt noted with acute kidney injury, questionable colovesical fistula  PTA pt states independence with all aspects of his ADLs, transfers, ambulation--ocassional use of SPC; +falls=3, currently not driving  During initial eval, pt demonstrated slight deficits with his functional endurance(currently fair=15-20mins), transfer safety, ADL status, functional mobility, functional balance, and b/l UE strength  Pt would benefit from continued OT tx for the above deficits  2-3xwk/1-2wks        OT Discharge Recommendation: (home P T  )

## 2020-01-28 NOTE — UTILIZATION REVIEW
Initial Clinical Review    Admission: Date/Time/Statement: Inpatient Admission Orders (From admission, onward)     Ordered        01/27/20 2039  Inpatient Admission (expected length of stay for this patient Order details is greater than two midnights)  Once                   Orders Placed This Encounter   Procedures    Inpatient Admission (expected length of stay for this patient Order details is greater than two midnights)     Standing Status:   Standing     Number of Occurrences:   1     Order Specific Question:   Admitting Physician     Answer:   Richard Villarreal     Order Specific Question:   Level of Care     Answer:   Med Surg [16]     Order Specific Question:   Estimated length of stay     Answer:   Not Applicable     ED Arrival Information     Expected Arrival [address] Tressa Wise of Arrival Escorted By Service Admission Type    - 1/27/2020 16:54 Urgent Ambulance [facility] ([address]) General Medicine Urgent    Arrival Complaint    -        Chief Complaint   Patient presents with    Vomiting     pt arriving via ems  pt reports N/V/D, body aches, and weakness for a few weeks  Assessment/Plan:  62  Y O male  Presents to ED  Via  EMS  From home with nausea, vomiting and diarrhea for past  Month, unable to keep food down  Has been  having  Frequent  Liquid brown diarrhea, frequency and urgency with  Episodes, unable to  Get  to bathroom in time  Has  Lightheadedness and dizziness, recent falls with hitting  His  Head, most recent were 2 days  Ago and  1 the day prior to admission  PMH  Is  DM2, hypertension, alcoholism, peripheral neuropathy, PE  On Xarelto, anemia, depression/anxiety and renal cell carcinoma found 11/19 and patient  Opted to  Watch and repeat  Imaging  In 1 year  Patient is poor  Historian,  Unable to provide   Details,  Information  Obtained  Through care everywhere  Ct abdomen/pelvis  Shows   mild circumferential bladder wall thickening possibly indicating cystitis  Moderate quantity of air within the bladder  However no recent instrumentation or Lemus insertion has been performed  Labs  Reveal acute kidney injury with a creatinine of  3 20, baseline appears to be  0 5-0 7, likely  Secondary to dehydration  Admit  Ip with  Nausea/Vomiting/diarrhea, acute kidney injury  and  Pneumomaturia and plan is  Monitor labs,  Stool  C/diff, IVF, GI consult, NPO for now,  IVF and stool for occult blood  Per  Gi Consult:     Unclear  Etiology of nausea, vomiting and nonbloody greasy/frothy diarrhea  Plan stool studies and continue  PPI  Pneumaturia with abnormal CT abdomen pelvis revealing mild circumferential bladder wall thickening/possible cystitis  Moderate quantity of air within the bladder without recent instrumentation or Lemus insertion  Question of colovesicular fistula   Wait  Urology  Input        ED Triage Vitals   Temperature Pulse Respirations Blood Pressure SpO2   01/27/20 1659 01/27/20 1659 01/27/20 1659 01/27/20 1659 01/27/20 1659   97 9 °F (36 6 °C) 73 17 130/71 100 %      Temp Source Heart Rate Source Patient Position - Orthostatic VS BP Location FiO2 (%)   01/27/20 1659 01/27/20 1659 01/27/20 1659 01/27/20 1659 --   Oral Monitor Lying Left arm       Pain Score       01/27/20 2351       No Pain        Wt Readings from Last 1 Encounters:   01/27/20 49 6 kg (109 lb 5 6 oz)     Additional Vital Signs:   01/28/20 0700  97 4 °F (36 3 °C)Abnormal   84  16  134/63  100 %  None (Room air)  Lying   01/27/20 2148  97 3 °F (36 3 °C)Abnormal   104  20  156/82  97 %  None (Room air)  Lying   01/27/20 2107  98 °F (36 7 °C)  --  --  --  --  --  --   01/27/20 1930  --  96  17  100/55  98 %  --  Lying   01/27/20 1659  97 9 °F (36 6 °C)  73  17  130/71  100 %  --  Lying         Pertinent Labs/Diagnostic Test Results:   Ct  Head  ( 1/27)    No acute intracranial abnormality  Ct  Abd/pelvis  ( 1/27)      Fluid-filled stomach and proximal small bowel with mild gastric and proximal small bowel wall thickening in keeping with a nonspecific gastritis/enteritis  No bowel obstruction or bowel pneumatosis  Mild circumferential bladder wall thickening possibly indicating a cystitis  Moderate quantity of air within the bladder may be secondary to recent catheterization    Results from last 7 days   Lab Units 01/28/20  0440 01/27/20  1854   WBC Thousand/uL 6 41 6 49   HEMOGLOBIN g/dL 10 4* 11 9*   HEMATOCRIT % 32 7* 36 9   PLATELETS Thousands/uL 215 263   NEUTROS ABS Thousands/µL  --  4 87         Results from last 7 days   Lab Units 01/28/20  0440 01/27/20  1854   SODIUM mmol/L 142 141   POTASSIUM mmol/L 3 2* 3 1*   CHLORIDE mmol/L 96* 95*   CO2 mmol/L 29 28   ANION GAP mmol/L 17* 18*   BUN mg/dL 41* 40*   CREATININE mg/dL 2 87* 3 20*   EGFR ml/min/1 73sq m 27 24   CALCIUM mg/dL 8 7 9 7   MAGNESIUM mg/dL 2 2  --    PHOSPHORUS mg/dL 5 6*  --      Results from last 7 days   Lab Units 01/28/20  0440 01/27/20  1854   AST U/L 44 53*   ALT U/L 65 76   ALK PHOS U/L 93 112   TOTAL PROTEIN g/dL 7 5 8 8*   ALBUMIN g/dL 3 7 4 2   TOTAL BILIRUBIN mg/dL 0 65 0 74     Results from last 7 days   Lab Units 01/28/20  1131 01/28/20  0555 01/27/20  2257 01/27/20  1739   POC GLUCOSE mg/dl 104 106 165* 151*     Results from last 7 days   Lab Units 01/28/20  0440 01/27/20  1854   GLUCOSE RANDOM mg/dL 147* 162*               Results from last 7 days   Lab Units 01/27/20  2300   PROTIME seconds 13 0   INR  0 97   PTT seconds 23     Results from last 7 days   Lab Units 01/28/20  0440   TSH 3RD GENERATON uIU/mL 1 294           Results from last 7 days   Lab Units 01/27/20  1854   LIPASE u/L 283         ED Treatment:   Medication Administration from 01/27/2020 1654 to 01/27/2020 2148       Date/Time Order Dose Route Action Comments     01/27/2020 2107 sodium chloride 0 9 % bolus 1,000 mL 0 mL Intravenous Stopped      01/27/2020 1858 sodium chloride 0 9 % bolus 1,000 mL 1,000 mL Intravenous New Bag no IV access 01/27/2020 1755 ondansetron (ZOFRAN-ODT) dispersible tablet 4 mg 4 mg Oral Given         Present on Admission:   Diabetes mellitus ([facility] )   GERD (gastroesophageal reflux disease)   Hyperlipidemia   Hypertension   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 2 diabetes mellitus ([facility])   AFRICA (acute kidney injury) ([facility])   Nausea vomiting and diarrhea   Pneumaturia      Admitting Diagnosis: Diarrhea [R19 7]  Vomiting [R11 10]  Malnutrition (HCC) [E46]  Nausea and vomiting [R11 2]  Pneumaturia [R39 89]  [place]ICA (acute kidney injury) ([facility] ) [N17 9]  Abnormal CT scan, bladder [R93 41]  Age/Sex: 62 y o  male  Admission Orders:  Scheduled Medications:    Medications:  pantoprazole 40 mg Intravenous Q12H [address]   potassium chloride 40 mEq Oral BID     Continuous IV Infusions:    sodium chloride 100 mL/hr Intravenous Continuous     PRN Meds:    acetaminophen 650 mg Oral Q6H PRN   ondansetron 4 mg Intravenous Q6H PRN       IP CONSULT TO UROLOGY  IP CONSULT TO NUTRITION SERVICES  IP CONSULT TO GASTROENTEROLOGY  IP CONSULT TO CASE MANAGEMENT  NPO  Contact precautions    Network Utilization Review Department  [email or web address]  ATTENTION: Please call with any questions or concerns to [phone] and carefully listen to the prompts so that you are directed to the right person  All voicemails are confidential   Zak Garcia all requests for admission clinical reviews, approved or denied determinations and any other requests to dedicated fax number below belonging to the campus where the patient is receiving treatment   List of dedicated fax numbers for the Facilities:  FACILITY NAME UR FAX NUMBER   ADMISSION DENIALS (Administrative/Medical Necessity) [phone]   [address] [place] [address] (Maternity/NICU/Pediatrics) [phone]   Oneyda Brush [phone]   [facility] [phone]   [address] [phone] [facility] [phone]   [facility]  [phone]   [address] [facility]  [phone]   [address] [place] [address] [facility] [phone]

## 2020-01-28 NOTE — OCCUPATIONAL THERAPY NOTE
OccupationalTherapy Evaluation(mlrq=[id number])     Patient Name: Natalie Hernandez  AGMSV'J Date: 1/28/2020  Problem List  Principal Problem:    [place] (acute kidney injury) ([facility] )  Active Problems:    Hypertension    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 2 diabetes mellitus ([facility] )    Diabetes mellitus ([place] [address] )    GERD (gastroesophageal reflux disease)    Hyperlipidemia    Nausea vomiting and diarrhea    Pneumaturia    Past Medical History  Past Medical History:   Diagnosis Date    Diabetes mellitus ([place] [address] )     GERD (gastroesophageal reflux disease)     Hyperlipidemia     Hypertension     Migraine     Psychiatric disorder      Past Surgical History  Past Surgical History:   Procedure Laterality Date    ESOPHAGOGASTRODUODENOSCOPY N/A 9/7/2016    Procedure: ESOPHAGOGASTRODUODENOSCOPY (EGD); Surgeon: Sona Pryor MD;  Location: [facility]; Service:              01/28/20 6106   Note Type   Note type Eval only   Restrictions/Precautions   Weight Bearing Precautions Per Order No   Other Precautions Contact/isolation;Multiple lines; Fall Risk  (r/o C-diff)   Pain Assessment   Pain Assessment No/denies pain   Home Living   Type of Home Apartment  (7th flr, elevator access)   Home Layout One level   [address] [facility] [address] [facility]   Prior Function   Lives With Alone   ADL Assistance Independent   Falls in the last 6 months 1 to 4  (3)   Lifestyle   Autonomy PTA pt states independence with all aspects of his ADLs, transfers, ambulation--ocassional use of SPC; +falls=3, currently not driving   Reciprocal Relationships supportive dtr   Service to Others on disability   Intrinsic Gratification sports   Psychosocial   Psychosocial (WDL) WDL   Subjective   Subjective "What do you want me to do?"   ADL   Where Assessed Edge of bed   Eating Assistance 5  Supervision/Setup   Grooming Assistance 5  Supervision/Setup   UB Bathing Assistance 5  Supervision/Setup   LB Bathing Assistance 5  Supervision/Setup   UB Dressing Assistance 5  Supervision/Setup   LB Dressing Assistance 5  Supervision/Setup   Bed Mobility   Supine to Sit 5  Supervision   Additional items HOB elevated; Increased time required   Transfers   Sit to Stand 5  Supervision   Additional items Increased time required;Verbal cues   Stand to Sit 5  Supervision   Additional items Increased time required;Armrests   Stand pivot 4  Minimal assistance   Additional items Assist x 1; Increased time required;Verbal cues   Functional Mobility   Functional Mobility 4  Minimal assistance   Additional Comments x1   Additional items Rolling walker   Balance   Static Sitting Good   Dynamic Sitting Fair +   Static Standing Fair   Dynamic Standing Fair -   Activity Tolerance   Activity Tolerance Patient limited by fatigue   Medical Staff Made Aware nsg, P T     RUE Assessment   RUE Assessment WFL   RUE Strength   RUE Overall Strength Within Functional Limits - able to perform ADL tasks with strength  (4+/5 throughout)   LUE Assessment   LUE Assessment WFL   LUE Strength   LUE Overall Strength Within Functional Limits - able to perform ADL tasks with strength  (4+/5 throughout)   Hand Function   Gross Motor Coordination Functional   Fine Motor Coordination Functional   Sensation   Light Touch No apparent deficits   Proprioception   Proprioception No apparent deficits   Vision-Basic Assessment   Current Vision Does not wear glasses   Vision - Complex Assessment   Acuity Able to read clock/calendar on wall without difficulty   Perception   Inattention/Neglect Appears intact   Cognition   Overall Cognitive Status [facility]   Arousal/Participation Alert   Attention Attends with cues to redirect   Orientation Level Oriented X4   Memory Decreased recall of precautions   Following Commands Follows one step commands without difficulty   Assessment   Limitation Decreased ADL status; Decreased UE strength;Decreased cognition;Decreased endurance;Decreased high-level ADLs   Prognosis Good   Assessment Pt is a 55y/o female admitted to the hospital 2* symptoms of nausea, vomiting, diarrhea, and decreased PO intake  Pt noted with acute kidney injury, questionable colovesical fistula  PTA pt states independence with all aspects of his ADLs, transfers, ambulation--ocassional use of SPC; +falls=3, currently not driving  During initial eval, pt demonstrated slight deficits with his functional endurance(currently fair=15-20mins), transfer safety, ADL status, functional mobility, functional balance, and b/l UE strength  Pt would benefit from continued OT tx for the above deficits  2-3xwk/1-2wks  Goals   Patient Goals "to be able to get stronger"   STG Time Frame 3-5   Short Term Goal #1 Pt will demonstrate improved activity tolerance to good(20-30mins) and standing tolerance to 3-5mins to assist with ADLs  Short Term Goal #2 Pt will demonstrate mod I with their sit-stand transfers to assist with completion of their LE dressing  Short Term Goal  Pt will demonstrate proper walker/transfer safety 100% of the time  LTG Time Frame   (5-10 days)   Long Term Goal #1 Pt will demonstrate g/g- balance with all functional activities  Long Term Goal #2 Pt will demonstrate mod I with their UE and LE bathing/dresssing  Long Term Goal Pt will independently verbalize 2-3 potential fall risks/transfer safety hazards and their appropriate compensation techniques  Plan   Treatment Interventions ADL retraining;Functional transfer training;UE strengthening/ROM; Endurance training;Patient/family training;Equipment evaluation/education; Compensatory technique education   Goal Expiration Date 02/08/20   OT Treatment Day 0   OT Frequency 3-5x/wk   Recommendation   OT Discharge Recommendation   (home P T  )   Barthel Index   Feeding 10   Bathing 0   Grooming Score 5   Dressing Score 5   Bladder Score 10   Bowels Score 10   Toilet Use Score 5   Transfers (Bed/Chair) Score 10   Mobility (Level Surface) Score 0   Stairs Score 0   Barthel Index Score 55   Monique Cradle, OT

## 2020-01-28 NOTE — PROGRESS NOTES
Progress Note - Malena Aguilera 1965, 62 y o  male MRN: [id number]    Unit/Bed#: E2 -01 Encounter: [id number]    Primary Care Provider: Brissa Mckinley DO   Date and time admitted to hospital: 1/27/2020  4:54 PM        * Pneumaturia  Assessment & Plan  CT abdomen pelvis with mild circumferential bladder wall thickening possibly indicating cystitis  Moderate quantity of air within the bladder  However no recent instrumentation or Lemus insertion has been performed  Patient reports passing air when attempting to urinate  This will be followed by few drips of urine  Recent colonoscopy performed on 12/5/19 for malignancy screening at [place]  Report with a sigmoid polyp removed and poor prep  Patient reports symptoms starting after that but difficult to obtain specific time frame  Question if there is a ongoing fistula  Will have Urology evaluate  Nausea vomiting and diarrhea  Assessment & Plan  N / V / D x 1 month  Difficult time tolerating oral diet and will vomit frequently  Vomiting is dark brown/red in color  Diarrhea is purely liquid and brown  CT abdomen with fluid-filled stomach and proximal small bowel with mild gastric and proximal small bowel wall thickening in keeping with nonspecific gastritis/enteritis  No bowel obstruction or bowel pneumatosis  NPO upon admission, advance to clears to see how pt tolerates  Continue PPI b i d  Check C diff, enteric stool panel- pending  Obtain occult blood for gastric contents if pt vomits  Mag and phos ok  On IV fluids 100 ml/hr  GI following    [place] (acute kidney injury) ([facility] )  Assessment & Plan  Presenting with a Cr of 3 20  Baseline appears to be around 0 5-0 7  Likely secondary to dehydration give recent N/V/D  Received 1 L fluid bolus in ED     Continue with IV fluid hydration at 100 ml/hr  Recheck Cr today now 3 20--> 2 87  If Cr does not continue to improve will consult nephrology    Anemia  Assessment & Plan  Admitting hemoglobin of 11 9  Hemoglobin today 10 4  Possibly related to dilution as patient was placed on IV fluids  Consider GI as other sources patient was vomiting darker colored material yesterday  Will continue to trend  History of pulmonary embolus (PE)  Assessment & Plan  Patient unable to provide any information regarding  According to records at [facility] patient's anticoagulated on Xarelto  Review of records reveal acute PE on CT chest 12/23/18  Xarelto held given concern for possible blood in vomit  Unclear if pt is even taking blood thinner at home as he does have hx of non compliance with meds    Hyperlipidemia  Assessment & Plan  Continue statin    GERD (gastroesophageal reflux disease)  Assessment & Plan  Continue pepcid   PPI added     Diabetes mellitus [facility])  Assessment & Plan  Lab Results   Component Value Date    HGBA1C 12 8 (H) 08/11/2018     Recent Labs     01/27/20  1739 01/27/20  2257 01/28/20  0555 01/28/20  1131   POCGLU 151* 165* 106 104     Recheck A1c here  Reports taking lantus 20 units in the morning  NPO given possible GI bleed with vomiting  Blood sugar checks q6 while NPO  Change to q meal when diet is advanced    Hypertension  Assessment & Plan  Occasion regimen was verified with patient's pharmacy-[facility]  He is supposed to be taking losartan 25 mg daily, amlodipine 10 mg daily  BP here 134/63      VTE Pharmacologic Prophylaxis:   Pharmacologic: Pharmacologic VTE Prophylaxis contraindicated due to possible gi bleeding  Mechanical VTE Prophylaxis in Place: Yes    Discharge Plan: With need for continue inpatient stay for urology workup with air in bladder and [place] with need for IV fluids    Discussions with Specialists or Other Care Team Provider: Dr Pedrito Moore, nursing at bedside    Education and Discussions with Family / Patient: patient and daughter Elise Spearing via telephone    Time Spent for Care: 30 minutes    More than 50% of total time spent on counseling and coordination of care as described above  Current Length of Stay: 1 day(s)  Current Patient Status: Inpatient   Code Status: Level 1 - Full Code    Subjective:   Patient resting in chair at bedside  Reports his diarrhea has slowed  He has had no further vomiting episodes  Symptoms of passing year prior to urinating seem to come and go at this point  Await urology evaluation  Stool studies pending  Denies chest pain, chest pressure, shortness of breath  Objective:     Vitals:   Temp (24hrs), Av 7 °F (36 5 °C), Min:97 3 °F (36 3 °C), Max:98 °F (36 7 °C)    Temp:  [97 3 °F (36 3 °C)-98 °F (36 7 °C)] 97 4 °F (36 3 °C)  HR:  [] 84  Resp:  [16-20] 16  BP: (100-156)/(55-82) 134/63  SpO2:  [97 %-100 %] 100 %  Body mass index is 17 65 kg/m²  Input and Output Summary (last 24 hours): Intake/Output Summary (Last 24 hours) at 2020 1443  Last data filed at 2020 0654  Gross per 24 hour   Intake 1000 ml   Output 150 ml   Net 850 ml       Physical Exam:     Physical Exam   Constitutional: He is oriented to person, place, and time  No distress  Frail cachectic-appearing   HENT:   Head: Normocephalic and atraumatic  Cardiovascular: Normal rate, regular rhythm and normal heart sounds  Pulmonary/Chest: Effort normal and breath sounds normal  No stridor  No respiratory distress  He has no wheezes  He has no rales  He exhibits no tenderness  Abdominal: Soft  Bowel sounds are normal  He exhibits no distension and no mass  There is tenderness  There is no rebound and no guarding  No hernia  Musculoskeletal: Normal range of motion  He exhibits no edema  Neurological: He is alert and oriented to person, place, and time  Skin: Skin is warm and dry  He is not diaphoretic  Nursing note and vitals reviewed        Additional Data:     Labs:    Results from last 7 days   Lab Units 20  0440 20  1854   WBC Thousand/uL 6 41 6 49   HEMOGLOBIN g/dL 10 4* 11 9*   HEMATOCRIT % 32 7* 36 9 PLATELETS Thousands/uL 215 263   NEUTROS PCT %  --  75   LYMPHS PCT %  --  18   MONOS PCT %  --  6   EOS PCT %  --  0     Results from last 7 days   Lab Units 01/28/20  0440   POTASSIUM mmol/L 3 2*   CHLORIDE mmol/L 96*   CO2 mmol/L 29   BUN mg/dL 41*   CREATININE mg/dL 2 87*   CALCIUM mg/dL 8 7   ALK PHOS U/L 93   ALT U/L 65   AST U/L 44     Results from last 7 days   Lab Units 01/27/20  2300   INR  0 97       * I Have Reviewed All Lab Data Listed Above  * Additional Pertinent Lab Tests Reviewed: [facility] Admission Reviewed    Imaging:    Imaging Reports Reviewed Today Include:   Imaging Personally Reviewed by Myself Includes:      Recent Cultures (last 7 days):           Last 24 Hours Medication List:     Current Facility-Administered Medications:  acetaminophen 650 mg Oral Q6H PRN Vivi Piger, PA-C    ondansetron 4 mg Intravenous Q6H PRN Vivi Piger, PA-C    pantoprazole 40 mg Intravenous Q12H [address] Vivi Piger, PA-C    potassium chloride 40 mEq Oral BID Vivi Piger, PA-C    sodium chloride 100 mL/hr Intravenous Continuous Vivi Piger, PA-C Last Rate: 100 mL/hr (01/28/20 0943)        Today, Patient Was Seen By: Guerrero Huffman PA-C    ** Please Note: This note has been constructed using a voice recognition system   **

## 2020-01-28 NOTE — MALNUTRITION/BMI
This medical record reflects one or more clinical indicators suggestive of malnutrition and/or morbid obesity  Malnutrition Findings:   Malnutrition type: Acute illness(acute severe pro, stefanie malnutrition d/t reported n/v/d as evidence by <50% energy intake >5 days, 14% wt  loss x 1 month, BMI 17 65)  Degree of Malnutrition: Other severe protein calorie malnutrition(currently NPO  Will monitor diet advancement/tolerance  If able to tolerate po recommend supplements/snacks in between meals to maximize pro, stefanie intake  If unable to tolerate PO can consider initiating TPN  )  Malnutrition Characteristics: Inadequate energy, Weight loss    BMI Findings:  BMI Classifications: Underweight < 18 5     Body mass index is 17 65 kg/m²  See Nutrition note dated 1/28/20 for additional details  Completed nutrition assessment is viewable in the nutrition documentation

## 2020-01-28 NOTE — CONSULTS
Patient MRN: [id number]  Date of Service: 1/28/2020  Referring Provider:  [employer] DANETTE Miguel  Provider Creating Note: Laura Mata PA-C  PCP: Dariel Jones    Reason for Consult:  Nausea vomiting diarrhea    HISTORY OF PRESENT ILLNESS:  Ephraim Melton is a 62 y o  male with a history of type 2 DM, HTN, HLD, alcohol history, PE on Xarelto, recent pancreatitis 2/2 ozempic, chronic anemia, renal cell adenomaCa dx'd 11/2019 not undergoing treatment who was admitted yesterday evening complaining of nausea vomiting and diarrhea for the past month  He has a very poor historian  He tells me he has had GI symptoms since the summer but later states for 2 months  He has had poor PO intake with dry heaves and vomiting reported whenever he eats  He reports liquid brown greasy, frothy diarrhea several times daily related to meals  He has had urgency with incontinence of stool  He had a recent poorly prepped colonoscopy at [facility] 12/05/2019 for screening - sigmoid tubular adenomatous polyp was removed  Last EGD 2016 which showed severe ulcerative esophagitis, candidiasis, mild gastritis and duodenitis  He was treated with Diflucan  He denies any melena, hematochezia, or bright red blood per rectum  He states he has had some vomiting which appeared dark in color  He denies any heartburn or dysphagia  CT abdomen pelvis without contrast performed yesterday showed nonspecific gastritis/enteritis, mild circumferential bladder wall thickening (?cystitis) and a moderate amount of air within the bladder  He has reported passing air and white foam through his urethra  There are concerns for colovesical fistula  Unfortunately his study was limited by the absence of contrast because of his kidney disease  Urology has been consulted for possible cystoscopy to evaluate cause of pneumaturia  Stool studies have been requested but no bowel movements since admission    Review of a CT with IV contrast 11/2019 also showed possible enteritis vs developing SBO  Review of Systems:    General:   No fever or chills; +significant weight loss   EENT:   No ear pain, facial swelling; No sneezing, sore throat  Skin:   No rashes, color changes  Respiratory:     No shortness of breath, cough, wheezing, stridor  Cardiovascular:     No chest pain, palpitations  Gastrointestinal:    As per HPI  Musculoskeletal:     No arthralgias, myalgias, swelling  Neurologic:   No dizziness, numbness, weakness  No speech difficulties  Psych:   No agitation, suicidal ideations    Otherwise, All other twelve-point review of systems normal      Past Medical History:   Diagnosis Date    Diabetes mellitus ([facility] [address] )     GERD (gastroesophageal reflux disease)     Hyperlipidemia     Hypertension     Migraine     Psychiatric disorder      Past Surgical History:   Procedure Laterality Date    ESOPHAGOGASTRODUODENOSCOPY N/A 9/7/2016    Procedure: ESOPHAGOGASTRODUODENOSCOPY (EGD); Surgeon: Renzo Denton MD;  Location: [facility]; Service:      Allergies   Allergen Reactions    Lisinopril Swelling       Medications:  Home Medications  Prior to Admission medications    Medication Sig Start Date End Date Taking?  Authorizing Provider   busPIRone (BUSPAR) 5 mg tablet Take 5 mg by mouth 2 (two) times a day 12/3/19  Yes Historical Provider, MD   rivaroxaban (XARELTO) 20 mg tablet Take 20 mg by mouth 1/10/20  Yes Historical Provider, MD   amLODIPine (NORVASC) 10 mg tablet Take 1 tablet (10 mg total) by mouth daily 8/17/18   Zina Silverio MD   atorvastatin (LIPITOR) 40 mg tablet Take 40 mg by mouth daily    Historical Provider, MD   citalopram (CeleXA) 40 mg tablet Take 40 mg by mouth daily    Historical Provider, MD   famotidine (PEPCID) 20 mg tablet Take 1 tablet (20 mg total) by mouth daily 8/16/18   Zina Silverio MD   gabapentin (NEURONTIN) 100 mg capsule Take 1 capsule (100 mg total) by mouth 2 (two) times a day 8/16/18   Zina Silverio MD   hydrochlorothiazide (HYDRODIURIL) 12 5 mg tablet Take 1 tablet (12 5 mg total) by mouth daily 8/17/18   Araceli Reza MD   insulin glargine [facility]) 100 units/mL injection pen Inject 14 Units under the skin daily before breakfast 8/16/18   Araceli Reza MD   losartan (COZAAR) 50 mg tablet Take 25 mg by mouth daily    Historical Provider, MD   metFORMIN (GLUCOPHAGE) 1000 MG tablet Take 1 tablet (1,000 mg total) by mouth daily with breakfast 8/17/18   Araceli Reza MD   metoprolol tartrate (LOPRESSOR) 50 mg tablet Take 1 tablet (50 mg total) by mouth every 12 (twelve) hours 8/16/18   Araceli Reza MD   mirtazapine (REMERON) 15 mg tablet Take 1 tablet (15 mg total) by mouth daily at bedtime 8/16/18   Araceli Reza MD   naproxen (NAPROSYN) 500 mg tablet Take 1 tablet (500 mg total) by mouth 2 (two) times a day with meals 12/12/18   Iraj Burrell MD   nystatin-triamcinolone (MYCOLOG-II) cream Apply topically 2 (two) times a day 8/16/18   Araceli Reza MD   pantoprazole (PROTONIX) 40 mg tablet Take 40 mg by mouth daily    Historical Provider, MD       Inhouse Medications    Current Facility-Administered Medications:     acetaminophen (TYLENOL) tablet 650 mg, 650 mg, Oral, Q6H PRN    ondansetron (ZOFRAN) injection 4 mg, 4 mg, Intravenous, Q6H PRN    pantoprazole (PROTONIX) injection 40 mg, 40 mg, Intravenous, Q12H SHERRY, 40 mg at 01/27/20 2330    sodium chloride 0 9 % infusion, 100 mL/hr, Intravenous, Continuous, 100 mL/hr at 01/27/20 2300      Social History   reports that he has quit smoking  He has never used smokeless tobacco  He reports that he has current or past drug history  Drug: Marijuana  He reports that he does not drink alcohol  Family History  History reviewed  No pertinent family history        OBJECTIVE:    /63 (BP Location: Right arm)   Pulse 84   Temp (!) 97 4 °F (36 3 °C) (Tympanic)   Resp 16   Ht 5' 6" (1 676 m)   Wt 49 6 kg (109 lb 5 6 oz)   SpO2 100%   BMI 17 65 kg/m²   Physical Exam:     General Appearance:    Awake, alert, oriented x3, no distress, well developed, well    Nourished, nontoxic appearing   Head:    Normocephalic without obvious abnormality   Eyes:    PERRL, conjunctiva/corneas clear, EOM's intact        Neck:   Supple, no adenopathy   Throat:   Mucous membranes moist   Lungs:     Clear to auscultation bilaterally, no wheezing or rhonchi   Heart:    Regular rate and rhythm, S1 and S2 normal, no murmur   Abdomen:     Soft, flat, mild LUQ tenderness, non-distended  bowel sounds active  No  masses, rebound or guarding  Extremities:   Extremities without edema   Psych  Derm:   Normal affect    No jaundice   Neurologic:   CNII-XII grossly intact  Speech intact         Laboratory Studies:  Results from last 7 days   Lab Units 01/28/20  0440 01/27/20  2300 01/27/20  1854   WBC Thousand/uL 6 41  --  6 49   HEMOGLOBIN g/dL 10 4*  --  11 9*   HEMATOCRIT % 32 7*  --  36 9   MCV fL 100*  --  100*   PLATELETS Thousands/uL 215  --  263   INR   --  0 97  --      Results from last 7 days   Lab Units 01/28/20  0440 01/27/20  1854   SODIUM mmol/L 142 141   POTASSIUM mmol/L 3 2* 3 1*   CHLORIDE mmol/L 96* 95*   CO2 mmol/L 29 28   BUN mg/dL 41* 40*   CREATININE mg/dL 2 87* 3 20*   CALCIUM mg/dL 8 7 9 7   ALBUMIN g/dL 3 7 4 2   TOTAL BILIRUBIN mg/dL 0 65 0 74   ALK PHOS U/L 93 112   ALT U/L 65 76   AST U/L 44 53*     Lab Results   Component Value Date    LIPASE 283 01/27/2020    LIPASE 890 (H) 08/15/2018    LIPASE 1,153 (H) 08/14/2018         Imaging and Other Studies:  Ct Abdomen Pelvis Wo Contrast    Result Date: 1/27/2020  Narrative: CT ABDOMEN AND PELVIS WITHOUT IV CONTRAST INDICATION:   Nausea/vomiting L sided abdominal pain    "Patient with nausea, vomiting and diarrhea, going on for couple of weeks"Differential diagnosis: Gastroenteritis, viral GI illness, pancreatitis, rule out acute abdomen, dehydration " COMPARISON:  7/20/2018 TECHNIQUE:  CT examination of the abdomen and pelvis was performed without intravenous contrast   Axial, sagittal, and coronal 2D reformatted images were created from the source data and submitted for interpretation  Radiation dose length product (DLP) for this visit:  280 mGy-cm   This examination, like all CT scans performed in the [facility], was performed utilizing techniques to minimize radiation dose exposure, including the use of iterative reconstruction and automated exposure control  Enteric contrast was administered  FINDINGS: ABDOMEN LOWER CHEST:  No clinically significant abnormality identified in the visualized lower chest  LIVER/BILIARY TREE:  Known right hepatic hemangioma is not well visualized without IV contrast  GALLBLADDER:  Gallbladder is surgically absent  SPLEEN:  A splenic hypodensity seen on the prior study is not well-visualized on today's noncontrast study  PANCREAS:  Unremarkable  ADRENAL GLANDS:  Unremarkable  KIDNEYS/URETERS:  Stable cyst(s)  No hydronephrosis  STOMACH AND BOWEL:  Fluid-filled stomach and proximal small bowel with mild gastric and proximal small bowel wall thickening in keeping with a nonspecific gastritis/enteritis  No bowel obstruction or bowel pneumatosis  APPENDIX:  No findings to suggest appendicitis  ABDOMINOPELVIC CAVITY:  No ascites or free intraperitoneal air  No lymphadenopathy  VESSELS:  Unremarkable for patient's age  PELVIS REPRODUCTIVE ORGANS:  Unremarkable for patient's age  URINARY BLADDER:  Mild circumferential bladder wall thickening  Moderate quantity of air within the bladder may be secondary to recent catheterization  ABDOMINAL WALL/INGUINAL REGIONS:  Unremarkable  OSSEOUS STRUCTURES:  No acute fracture or destructive osseous lesion  Impression:  Fluid-filled stomach and proximal small bowel with mild gastric and proximal small bowel wall thickening in keeping with a nonspecific gastritis/enteritis  No bowel obstruction or bowel pneumatosis   Mild circumferential bladder wall thickening possibly indicating a cystitis  Moderate quantity of air within the bladder may be secondary to recent catheterization  The study was marked in [facility] for immediate notification  Workstation performed: [id number]     Ct Head Wo Contrast    Result Date: 1/27/2020  Narrative: CT BRAIN - WITHOUT CONTRAST INDICATION:   Head trauma and anticoagulation  COMPARISON:  10/21/2010  TECHNIQUE:  CT examination of the brain was performed  In addition to axial images, coronal 2D reformatted images were created and submitted for interpretation  Radiation dose length product (DLP) for this visit:  827 mGy-cm   This examination, like all CT scans performed in the [facility], was performed utilizing techniques to minimize radiation dose exposure, including the use of iterative reconstruction and automated exposure control  IMAGE QUALITY:  Diagnostic  FINDINGS: PARENCHYMA:  No intracranial hemorrhage or mass effect  VENTRICLES AND EXTRA-AXIAL SPACES:  Cavum septum pellucidum  No hydrocephalus or extra-axial collection  VISUALIZED ORBITS AND PARANASAL SINUSES:  No retrobulbar hematoma  No paranasal sinus disease  CALVARIUM AND EXTRACRANIAL SOFT TISSUES:  No acute calvarial fracture or soft tissue hematoma  Impression: No acute intracranial abnormality  Workstation performed: [id number]         ASSESSMENT AND PLAN:  1  Pneumaturia with abnormal CT abdomen pelvis revealing mild circumferential bladder wall thickening/possible cystitis  Moderate quantity of air within the bladder without recent instrumentation or Lemus insertion  Question of colovesicular fistula  Urology evaluation pending for possible cystoscopy  2  Chronic nausea, vomiting, nonbloody greasy/frothy diarrhea unclear etiology  CT abdomen/pelvis shows fluid-filled stomach and proximal small bowel with mild gastric and proximal small bowel wall thickening in keeping with nonspecific gastritis/enteritis  No bowel obstruction or bowel pneumatosis  Recent colonoscopy at [address] [place] [address] [address] 12/5 was very poorly prepped with 1 tubular adenomatous polyp removed  Last EGD 2016 with severe ulcerative esophagitis, candidiasis, gastritis and duodenitis  Agree with stool studies  Continue PPI  3  Recent pancreatitis felt secondary to Ozempic  Lipase within normal limits  No clinical evidence of pancreatitis  4  History of PE on Xarelto  5  Renal cell adenocarcinoma diagnosed 11/2019 not currently on treatment  Surveillance as outpatient           Cayetano Andrade PA-C

## 2020-01-28 NOTE — ED CARE HANDOFF
Emergency Department Sign Out Note        Sign out and transfer of care from Dr Neil Vizcaino  See Separate Emergency Department note  The patient, Sena Brown, was evaluated by the previous provider for n/v/d  Workup Completed:  Awaiting labs and imaging  May go home if normal    ED Course / Workup Pending (followup):   Labs Reviewed   CBC AND DIFFERENTIAL - Abnormal       Result Value Ref Range Status    WBC 6 49  4 31 - 10 16 Thousand/uL Final    RBC 3 70 (*) 3 88 - 5 62 Million/uL Final    Hemoglobin 11 9 (*) 12 0 - 17 0 g/dL Final    Hematocrit 36 9  36 5 - 49 3 % Final     (*) 82 - 98 fL Final    MCH 32 2  26 8 - 34 3 pg Final    MCHC 32 2  31 4 - 37 4 g/dL Final    RDW 13 9  11 6 - 15 1 % Final    MPV 11 9  8 9 - 12 7 fL Final    Platelets 604  729 - 390 Thousands/uL Final    nRBC 0  /100 WBCs Final    Neutrophils Relative 75  43 - 75 % Final    Immat GRANS % 0  0 - 2 % Final    Lymphocytes Relative 18  14 - 44 % Final    Monocytes Relative 6  4 - 12 % Final    Eosinophils Relative 0  0 - 6 % Final    Basophils Relative 1  0 - 1 % Final    Neutrophils Absolute 4 87  1 85 - 7 62 Thousands/µL Final    Immature Grans Absolute 0 02  0 00 - 0 20 Thousand/uL Final    Lymphocytes Absolute 1 14  0 60 - 4 47 Thousands/µL Final    Monocytes Absolute 0 39  0 17 - 1 22 Thousand/µL Final    Eosinophils Absolute 0 01  0 00 - 0 61 Thousand/µL Final    Basophils Absolute 0 06  0 00 - 0 10 Thousands/µL Final   COMPREHENSIVE METABOLIC PANEL - Abnormal    Sodium 141  136 - 145 mmol/L Final    Potassium 3 1 (*) 3 5 - 5 3 mmol/L Final    Chloride 95 (*) 100 - 108 mmol/L Final    CO2 28  21 - 32 mmol/L Final    ANION GAP 18 (*) 4 - 13 mmol/L Final    BUN 40 (*) 5 - 25 mg/dL Final    Creatinine 3 20 (*) 0 60 - 1 30 mg/dL Final    Comment: Standardized to IDMS reference method    Glucose 162 (*) 65 - 140 mg/dL Final    Comment:   If the patient is fasting, the ADA then defines impaired fasting glucose as > 100 mg/dL and diabetes as > or equal to 123 mg/dL  Specimen collection should occur prior to Sulfasalazine administration due to the potential for falsely depressed results  Specimen collection should occur prior to Sulfapyridine administration due to the potential for falsely elevated results  Calcium 9 7  8 3 - 10 1 mg/dL Final    AST 53 (*) 5 - 45 U/L Final    Comment:   Specimen collection should occur prior to Sulfasalazine administration due to the potential for falsely depressed results  ALT 76  12 - 78 U/L Final    Comment:   Specimen collection should occur prior to Sulfasalazine administration due to the potential for falsely depressed results  Alkaline Phosphatase 112  46 - 116 U/L Final    Total Protein 8 8 (*) 6 4 - 8 2 g/dL Final    Albumin 4 2  3 5 - 5 0 g/dL Final    Total Bilirubin 0 74  0 20 - 1 00 mg/dL Final    eGFR 24  ml/min/1 73sq m Final    Narrative:     [place] guidelines for Chronic Kidney Disease (CKD):     Stage 1 with normal or high GFR (GFR > 90 mL/min/1 73 square meters)    Stage 2 Mild CKD (GFR = 60-89 mL/min/1 73 square meters)    Stage 3A Moderate CKD (GFR = 45-59 mL/min/1 73 square meters)    Stage 3B Moderate CKD (GFR = 30-44 mL/min/1 73 square meters)    Stage 4 Severe CKD (GFR = 15-29 mL/min/1 73 square meters)    Stage 5 End Stage CKD (GFR <15 mL/min/1 73 square meters)  Note: GFR calculation is accurate only with a steady state creatinine   POCT GLUCOSE - Abnormal    POC Glucose 151 (*) 65 - 140 mg/dl Final   LIPASE - Normal    Lipase 283  73 - 393 u/L Final   OCCULT BLOOD GASTRIC / DUODENUM   POCT URINALYSIS DIPSTICK     CT cystogram   Final Result      No evidence of colovesical fistula identified  Workstation performed: [id number]         CT head wo contrast   Final Result      No acute intracranial abnormality                    Workstation performed: [id number]         CT abdomen pelvis wo contrast   Final Result       Fluid-filled stomach and proximal small bowel with mild gastric and proximal small bowel wall thickening in keeping with a nonspecific gastritis/enteritis  No bowel obstruction or bowel pneumatosis  Mild circumferential bladder wall thickening possibly indicating a cystitis  Moderate quantity of air within the bladder may be secondary to recent catheterization  The study was marked in [facility] for immediate notification  Workstation performed: [id number]                                        Procedures  MDM    Disposition  Final diagnoses:   Nausea and vomiting   Diarrhea   [place] (acute kidney injury) ([facility] )   Abnormal CT scan, bladder     Time reflects when diagnosis was documented in both MDM as applicable and the Disposition within this note     Time User Action Codes Description Comment    1/27/2020  8:36 PM Sánchez Felix Add [R11 2] Nausea and vomiting     1/27/2020  8:36 PM Andree Basil [R19 7] Diarrhea     1/27/2020  8:36 PM Andree Basil [N17 9] [place] (acute kidney injury) ([facility] )     1/27/2020  8:37 PM Sánchez Felix Add [R93 41] Abnormal CT scan, bladder     1/27/2020  8:51 PM Piger, Lorrene Hummingbird Modify [N17 9] [facility] [facility]     1/27/2020  8:51 PM Richrd Sicilian Add [R39 89] Pneumaturia     1/27/2020  8:52 PM Piger, Lorrene Hummingbird Modify [N17 9] [place] (acute kidney injury) ([facility]     1/27/2020  9:38 PM Piger, Lorrene Hummingbird Add [E46] Malnutrition [facility])       ED Disposition     ED Disposition Condition Date/Time Comment    Admit Stable Mon Jan 27, 2020  8:36 PM Case was discussed with OLIVIER and the patient's admission status was agreed to be Admission Status: inpatient status to the service of Dr Derrell Yo           Follow-up Information     Follow up With Specialties Details Why [facility]  Follow up  [address]  [phone]          Current Discharge Medication List      CONTINUE these medications which have NOT CHANGED    Details   busPIRone (BUSPAR) 5 mg tablet Take 5 mg by mouth 2 (two) times a day      rivaroxaban (XARELTO) 20 mg tablet Take 20 mg by mouth      amLODIPine (NORVASC) 10 mg tablet Take 1 tablet (10 mg total) by mouth daily  Qty: 30 tablet, Refills: 0    Associated Diagnoses: Accelerated hypertension      atorvastatin (LIPITOR) 40 mg tablet Take 40 mg by mouth daily      citalopram (CeleXA) 40 mg tablet Take 40 mg by mouth daily      famotidine (PEPCID) 20 mg tablet Take 1 tablet (20 mg total) by mouth daily  Qty: 30 tablet, Refills: 0    Associated Diagnoses: Elevated lipase      gabapentin (NEURONTIN) 100 mg capsule Take 1 capsule (100 mg total) by mouth 2 (two) times a day  Qty: 60 capsule, Refills: 0    Associated Diagnoses: 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 2 diabetes mellitus (HCC)      insulin glargine (BASAGLAR KWIKPEN) 100 units/mL injection pen Inject 14 Units under the skin daily before breakfast  Qty: 5 pen, Refills: 0    Comments: Please also dispense a box of pen needles  Associated Diagnoses: Ambulatory dysfunction      losartan (COZAAR) 50 mg tablet Take 25 mg by mouth daily      metFORMIN (GLUCOPHAGE) 1000 MG tablet Take 1 tablet (1,000 mg total) by mouth daily with breakfast  Qty: 30 tablet, Refills: 1    Associated Diagnoses: Type 2 diabetes mellitus with hyperglycemia, without long-term current use of insulin (HCC)      mirtazapine (REMERON) 15 mg tablet Take 1 tablet (15 mg total) by mouth daily at bedtime  Qty: 30 tablet, Refills: 0    Associated Diagnoses: Generalized weakness           No discharge procedures on file         ED Provider  Electronically Signed by     Louise Guzmán DO  01/31/20 0455

## 2020-01-28 NOTE — H&P
H&P- [facility] 1965, 62 y o  male MRN: [id number]    Unit/Bed#: ED 16 Encounter: [id number]    Primary Care Provider: Hamzah Bass DO   Date and time admitted to hospital: 1/27/2020  4:54 PM      Primary Problem(s):  · Nausea / vomiting / diarrhea  · Presenting symptoms have been present for the past month  · Has been having difficult time tolerating oral diet and will vomit frequently  · Vomiting is dark brown/red in color  · Diarrhea is purely liquid and brown  · CT abdomen with fluid-filled stomach and proximal small bowel with mild gastric and proximal small bowel wall thickening in keeping with nonspecific gastritis/enteritis  No bowel obstruction or bowel pneumatosis  · Keep NPO for now  · Start PPI b i d  · Check C diff, enteric stool panel  · Obtain occult blood for gastric contents  · Check mag and phos  · Place on IV fluids 100 ml/hr  · Consult gastroenterology    Additional Problems:   · [place]: Presenting with a Cr of 3 20  Baseline appears to be around 0 5-0 7  Likely secondary to dehydration give recent N/V/D  Received 1 L fluid bolus in ED  Continue with IV fluid hydration at 100 ml/hr  · Pneumaturia:  CT abdomen pelvis with mild circumferential bladder wall thickening possibly indicating cystitis  Moderate quantity of air within the bladder  However no recent instrumentation or Lemus insertion has been performed  Patient reports passing air attempting to urinate  This will be followed by few drips of urine  Recent colonoscopy performed on 12/5/19 for malignancy screening at [facility]  Report with a sigmoid polyp removed and poor prep  Patient reports symptoms starting after that but difficult to obtain specific time frame  Question if there is a ongoing fistula  Will have Urology evaluate  · History of PE:  Patient unable to provide any information regarding  According to records at [facility] patient's anticoagulated on Xarelto    Review of records reveal acute PE on CT chest 12/23/18  Hold Xarelto in the setting of possible GI bleed  Check coags on admission- PTT, INR, and factor xa (which is listed under heparin level)  · Recent falls at home: Possibly on xarelto -->that is if pt is taking the medication  Reports multiple recent falls at home  Most recent being 2 days ago having the front of his head hit a towel rack  Other fall yesterday and hit his head on the back of a door  Obtain STAT CT head without contrast to rule out bleed  · Type 2 diabetes: Reports taking lantus 20 units in the morning  Need to keep NPO given possible GI bleed with vomiting  Blood sugar checks q6  Recent med refill from [facility] everywhere with refills for   -pepcid 20 mg daily   -atorvastatin 40 mg daily   -xarelto 20 mg daily    Unclear what medications pt is taking  Does not know them and reports they come in a blister pack  A comprehensive list will need to be obtained from his pharmacy in the AM       VTE Prophylaxis: Heparin  / sequential compression device   Code Status: full code  Anticipated Length of Stay:  Patient will be admitted on an Inpatient basis with an anticipated length of stay of  Greater than 2 midnights  Justification for Hospital Stay: [place] with need for IV fluids, N/V/D with need for further supportive care and workup    Chief Complaint:   Nausea / vomiting / diarrhea x 1 month    History of Present Illness:    Nikki Fregoso is a 62 y o  male with past medical history of type 2 diabetes, hypertension, hyperlipidemia, history of alcoholism, peripheral neuropathy, PE anticoagulated on Xarelto, anemia, depression/anxiety, renal cell carcinoma found in 11/2019 that patient opted to undergo surveillance with repeat imaging in 1 year  Patient is a very poor historian and unable to provide details about his past medical history or current medical condition  He does not know the medications that he takes at home      Receives the majority of his care through [facility]  Past medical history was obtained from Care everywhere  He presents with a complaint of nausea, vomiting, diarrhea for the past month  Patient reports being unable to keep any food down  He has difficulty tolerating things because they come back up  Associated dry heaving  Has also been having frequent a liquid brown diarrhea  Reports frequency and urgency with diarrhea and has messed himself multiple times  He is unable to get the bathroom in time  Additionally reports passing air through his urethra  He reports some white foam and then a few drops of urine follow  Complains of lightheadedness and dizziness  Has had recent falls where he reports hitting his head  Most recent being 2 days ago and then yesterday  Recent colonoscopy performed at [facility] on 12/5/19 for malignancy screening  A sigmoid polyp was removed and there was a poor prep  Denies any chest pain, chest pressure, palpitations, shortness of breath  Lives at home alone  No assistive devices to ambulate  Denies smoking, alcohol consumption at present, or current drug use  Review of Systems:    General:   No Fever + chills; + weight loss   EENT:   No ear pain, facial swelling; No sneezing, sore throat  Skin:   No rashes, color changes  Respiratory:     No shortness of breath, cough, wheezing, stridor  Cardiovascular:     No chest pain,   Gastrointestinal:    + nausea, vomiting, diarrhea; + abdominal pain  Musculoskeletal:     No arthralgias, myalgias, swelling  Neurologic:   No dizziness, numbness, weakness  No speech difficulties     Psych:   No agitation,     Otherwise, All other twelve-point review of systems normal      Past Medical and Surgical History:     Past Medical History:   Diagnosis Date    Diabetes mellitus ([address] )     GERD (gastroesophageal reflux disease)     Hyperlipidemia     Hypertension     Migraine     Psychiatric disorder        Past Surgical History:   Procedure Laterality Date    ESOPHAGOGASTRODUODENOSCOPY N/A 9/7/2016    Procedure: ESOPHAGOGASTRODUODENOSCOPY (EGD); Surgeon: Dixie Nair MD;  Location: [facility]; Service:        Meds/Allergies:    No current facility-administered medications for this encounter  Current Outpatient Medications   Medication Sig Dispense Refill    amLODIPine (NORVASC) 10 mg tablet Take 1 tablet (10 mg total) by mouth daily 30 tablet 0    atorvastatin (LIPITOR) 40 mg tablet Take 40 mg by mouth daily      citalopram (CELEXA) 20 mg tablet Take 1 tablet (20 mg total) by mouth daily 30 tablet 0    citalopram (CeleXA) 40 mg tablet Take 40 mg by mouth daily      famotidine (PEPCID) 20 mg tablet Take 1 tablet (20 mg total) by mouth daily 30 tablet 0    gabapentin (NEURONTIN) 100 mg capsule Take 1 capsule (100 mg total) by mouth 2 (two) times a day 60 capsule 0    hydrochlorothiazide (HYDRODIURIL) 12 5 mg tablet Take 1 tablet (12 5 mg total) by mouth daily 30 tablet 0    insulin glargine (BASAGLAR KWIKPEN) 100 units/mL injection pen Inject 14 Units under the skin daily before breakfast 5 pen 0    losartan (COZAAR) 50 mg tablet Take 25 mg by mouth daily      metFORMIN (GLUCOPHAGE) 1000 MG tablet Take 1 tablet (1,000 mg total) by mouth daily with breakfast 30 tablet 1    metoprolol tartrate (LOPRESSOR) 50 mg tablet Take 1 tablet (50 mg total) by mouth every 12 (twelve) hours 60 tablet 0    mirtazapine (REMERON) 15 mg tablet Take 1 tablet (15 mg total) by mouth daily at bedtime 30 tablet 0    naproxen (NAPROSYN) 500 mg tablet Take 1 tablet (500 mg total) by mouth 2 (two) times a day with meals 30 tablet 0    nystatin-triamcinolone (MYCOLOG-II) cream Apply topically 2 (two) times a day 30 g 0    pantoprazole (PROTONIX) 40 mg tablet Take 40 mg by mouth daily      pantoprazole (PROTONIX) 40 mg tablet Take 40 mg by mouth daily         Allergies   Allergen Reactions    Lisinopril Swelling       Allergies:    Allergies   Allergen Reactions  Lisinopril Swelling       Social History:     Marital Status: Single     Substance Use History:   Social History     Substance and Sexual Activity   Alcohol Use No     Social History     Tobacco Use   Smoking Status Former Smoker   Smokeless Tobacco Never Used     Social History     Substance and Sexual Activity   Drug Use Not Currently    Types: Marijuana       Family History:    non-contributory    Physical Exam:     Vitals:   Blood Pressure: 100/55 (01/27/20 1930)  Pulse: 96 (01/27/20 1930)  Temperature: 97 9 °F (36 6 °C) (01/27/20 1659)  Temp Source: Oral (01/27/20 1659)  Respirations: 17 (01/27/20 1930)  SpO2: 98 % (01/27/20 1930)    Physical Exam   Constitutional: He is oriented to person, place, and time  No distress  Malnutrition, frail, cachectic   HENT:   Head: Normocephalic and atraumatic  Cardiovascular: Normal rate, regular rhythm and normal heart sounds  Pulmonary/Chest: Effort normal and breath sounds normal  No stridor  No respiratory distress  He has no wheezes  He has no rales  He exhibits no tenderness  Abdominal: Soft  Bowel sounds are normal  He exhibits no distension and no mass  There is no tenderness  There is no rebound and no guarding  No hernia  Musculoskeletal:   Dm port in posterior right arm   Neurological: He is alert and oriented to person, place, and time  Skin: Skin is warm and dry  He is not diaphoretic  Nursing note and vitals reviewed  Additional Data:     Lab Results: I have personally reviewed pertinent reports        Results from last 7 days   Lab Units 01/27/20  1854   WBC Thousand/uL 6 49   HEMOGLOBIN g/dL 11 9*   HEMATOCRIT % 36 9   PLATELETS Thousands/uL 263   NEUTROS PCT % 75   LYMPHS PCT % 18   MONOS PCT % 6   EOS PCT % 0     Results from last 7 days   Lab Units 01/27/20  1854   POTASSIUM mmol/L 3 1*   CHLORIDE mmol/L 95*   CO2 mmol/L 28   BUN mg/dL 40*   CREATININE mg/dL 3 20*   CALCIUM mg/dL 9 7   ALK PHOS U/L 112   ALT U/L 76   AST U/L 53* Imaging: I have personally reviewed pertinent reports  Ct Abdomen Pelvis Wo Contrast    Result Date: 1/27/2020  Narrative: CT ABDOMEN AND PELVIS WITHOUT IV CONTRAST INDICATION:   Nausea/vomiting L sided abdominal pain  "Patient with nausea, vomiting and diarrhea, going on for couple of weeks"Differential diagnosis: Gastroenteritis, viral GI illness, pancreatitis, rule out acute abdomen, dehydration " COMPARISON:  7/20/2018 TECHNIQUE:  CT examination of the abdomen and pelvis was performed without intravenous contrast   Axial, sagittal, and coronal 2D reformatted images were created from the source data and submitted for interpretation  Radiation dose length product (DLP) for this visit:  280 mGy-cm   This examination, like all CT scans performed in the [facility], was performed utilizing techniques to minimize radiation dose exposure, including the use of iterative reconstruction and automated exposure control  Enteric contrast was administered  FINDINGS: ABDOMEN LOWER CHEST:  No clinically significant abnormality identified in the visualized lower chest  LIVER/BILIARY TREE:  Known right hepatic hemangioma is not well visualized without IV contrast  GALLBLADDER:  Gallbladder is surgically absent  SPLEEN:  A splenic hypodensity seen on the prior study is not well-visualized on today's noncontrast study  PANCREAS:  Unremarkable  ADRENAL GLANDS:  Unremarkable  KIDNEYS/URETERS:  Stable cyst(s)  No hydronephrosis  STOMACH AND BOWEL:  Fluid-filled stomach and proximal small bowel with mild gastric and proximal small bowel wall thickening in keeping with a nonspecific gastritis/enteritis  No bowel obstruction or bowel pneumatosis  APPENDIX:  No findings to suggest appendicitis  ABDOMINOPELVIC CAVITY:  No ascites or free intraperitoneal air  No lymphadenopathy  VESSELS:  Unremarkable for patient's age  PELVIS REPRODUCTIVE ORGANS:  Unremarkable for patient's age   URINARY BLADDER:  Mild circumferential bladder wall thickening  Moderate quantity of air within the bladder may be secondary to recent catheterization  ABDOMINAL WALL/INGUINAL REGIONS:  Unremarkable  OSSEOUS STRUCTURES:  No acute fracture or destructive osseous lesion  Impression:  Fluid-filled stomach and proximal small bowel with mild gastric and proximal small bowel wall thickening in keeping with a nonspecific gastritis/enteritis  No bowel obstruction or bowel pneumatosis  Mild circumferential bladder wall thickening possibly indicating a cystitis  Moderate quantity of air within the bladder may be secondary to recent catheterization  The study was marked in [facility] for immediate notification  Workstation performed: [id number]       EKG, Pathology, and Other Studies Reviewed on Admission:   · EKG: none    Allscripts Records Reviewed: Yes     Total Time for Visit, including Counseling / Coordination of Care: 45 minutes  Greater than 50% of this total time spent on direct patient counseling and coordination of care      ** Please Note: This note has been constructed using a voice recognition system   **

## 2020-01-28 NOTE — ASSESSMENT & PLAN NOTE
With concern for colovesicular fistula secondary to moderate air in his bladder without recent bladder instrumentation, he is about 7 weeks post colonoscopy  Lemus was placed after the time of the CT  CT cystogram results- negative for colovesical fistula  Unfortunately no urinalysis collected this hospitalization  Check culture for bacterial growth  This amount of air would be abnormally high amount for a gas forming bacteria UTI but should be ruled out  He has no LUTS suggestive of UTI prehospital  Continue to monitor off antibiotics and maintain Lemus for now  Consider void trial tomorrow morning in the hospital or early next week in our office  Will arrange outpatient cystoscopy once urine results have returned and patient is catheter free

## 2020-01-28 NOTE — ASSESSMENT & PLAN NOTE
Occasion regimen was verified with patient's pharmacy-[facility]  He is supposed to be taking losartan 25 mg daily, amlodipine 10 mg daily  BP here 134/63    At this point will resume amlodipine and hold off on losartan given [place]

## 2020-01-28 NOTE — ASSESSMENT & PLAN NOTE
N / V / D x 1 month  Difficult time tolerating oral diet and will vomit frequently  Vomiting is dark brown/red in color  Diarrhea is purely liquid and brown  CT abdomen with fluid-filled stomach and proximal small bowel with mild gastric and proximal small bowel wall thickening in keeping with nonspecific gastritis/enteritis  No bowel obstruction or bowel pneumatosis  NPO upon admission, advance to clears to see how pt tolerates  Continue PPI b i d    Check C diff, enteric stool panel- pending  Obtain occult blood for gastric contents if pt vomits  Mag and phos ok  On IV fluids 100 ml/hr  GI following

## 2020-01-28 NOTE — ASSESSMENT & PLAN NOTE
CT abdomen with mild circumferential bladder wall thickening possibly indicating cystitis  Moderate quantity of air within the bladder  However no recent instrumentation or Lemus insertions

## 2020-01-28 NOTE — PLAN OF CARE
Problem: Potential for Falls  Goal: Patient will remain free of falls  Description  INTERVENTIONS:  - Assess patient frequently for physical needs  -  Identify cognitive and physical deficits and behaviors that affect risk of falls  -  Carpenter fall precautions as indicated by assessment   - Educate patient/family on patient safety including physical limitations  - Instruct patient to call for assistance with activity based on assessment  - Modify environment to reduce risk of injury  - Consider OT/PT consult to assist with strengthening/mobility  Outcome: Progressing     Problem: Nutrition/Hydration-ADULT  Goal: Nutrient/Hydration intake appropriate for improving, restoring or maintaining nutritional needs  Description  Monitor and assess patient's nutrition/hydration status for malnutrition  Collaborate with interdisciplinary team and initiate plan and interventions as ordered  Monitor patient's weight and dietary intake as ordered or per policy  Utilize nutrition screening tool and intervene as necessary  Determine patient's food preferences and provide high-protein, high-caloric foods as appropriate       INTERVENTIONS:  - Monitor oral intake, urinary output, labs, and treatment plans  - Assess nutrition and hydration status and recommend course of action  - Evaluate amount of meals eaten  - Assist patient with eating if necessary   - Allow adequate time for meals  - Recommend/ encourage appropriate diets, oral nutritional supplements, and vitamin/mineral supplements  - Order, calculate, and assess calorie counts as needed  - Recommend, monitor, and adjust tube feedings and TPN/PPN based on assessed needs  - Assess need for intravenous fluids  - Provide specific nutrition/hydration education as appropriate  - Include patient/family/caregiver in decisions related to nutrition  Outcome: Progressing     Problem: GASTROINTESTINAL - ADULT  Goal: Minimal or absence of nausea and/or vomiting  Description  INTERVENTIONS:  - Administer IV fluids if ordered to ensure adequate hydration  - Maintain NPO status until nausea and vomiting are resolved  - Nasogastric tube if ordered  - Administer ordered antiemetic medications as needed  - Provide nonpharmacologic comfort measures as appropriate  - Advance diet as tolerated, if ordered  - Consider nutrition services referral to assist patient with adequate nutrition and appropriate food choices  Outcome: Progressing  Goal: Maintains or returns to baseline bowel function  Description  INTERVENTIONS:  - Assess bowel function  - Encourage oral fluids to ensure adequate hydration  - Administer IV fluids if ordered to ensure adequate hydration  - Administer ordered medications as needed  - Encourage mobilization and activity  - Consider nutritional services referral to assist patient with adequate nutrition and appropriate food choices  Outcome: Progressing  Goal: Maintains adequate nutritional intake  Description  INTERVENTIONS:  - Monitor percentage of each meal consumed  - Identify factors contributing to decreased intake, treat as appropriate  - Assist with meals as needed  - Monitor I&O, weight, and lab values if indicated  - Obtain nutrition services referral as needed  Outcome: Progressing  Goal: Establish and maintain optimal ostomy function  Description  INTERVENTIONS:  - Assess bowel function  - Encourage oral fluids to ensure adequate hydration  - Administer IV fluids if ordered to ensure adequate hydration   - Administer ordered medications as needed  - Encourage mobilization and activity  - Nutrition services referral to assist patient with appropriate food choices  - Assess stoma site  - Consider wound care consult   Outcome: Progressing     Problem: METABOLIC, FLUID AND ELECTROLYTES - ADULT  Goal: Electrolytes maintained within normal limits  Description  INTERVENTIONS:  - Monitor labs and assess patient for signs and symptoms of electrolyte imbalances  - Administer electrolyte replacement as ordered  - Monitor response to electrolyte replacements, including repeat lab results as appropriate  - Instruct patient on fluid and nutrition as appropriate  Outcome: Progressing  Goal: Fluid balance maintained  Description  INTERVENTIONS:  - Monitor labs   - Monitor I/O and WT  - Instruct patient on fluid and nutrition as appropriate  - Assess for signs & symptoms of volume excess or deficit  Outcome: Progressing  Goal: Glucose maintained within target range  Description  INTERVENTIONS:  - Monitor Blood Glucose as ordered  - Assess for signs and symptoms of hyperglycemia and hypoglycemia  - Administer ordered medications to maintain glucose within target range  - Assess nutritional intake and initiate nutrition service referral as needed  Outcome: Progressing     Problem: MUSCULOSKELETAL - ADULT  Goal: Maintain or return mobility to safest level of function  Description  INTERVENTIONS:  - Assess patient's ability to carry out ADLs; assess patient's baseline for ADL function and identify physical deficits which impact ability to perform ADLs (bathing, care of mouth/teeth, toileting, grooming, dressing, etc )  - Assess/evaluate cause of self-care deficits   - Assess range of motion  - Assess patient's mobility  - Assess patient's need for assistive devices and provide as appropriate  - Encourage maximum independence but intervene and supervise when necessary  - Involve family in performance of ADLs  - Assess for home care needs following discharge   - Consider OT consult to assist with ADL evaluation and planning for discharge  - Provide patient education as appropriate  Outcome: Progressing  Goal: Maintain proper alignment of affected body part  Description  INTERVENTIONS:  - Support, maintain and protect limb and body alignment  - Provide patient/ family with appropriate education  Outcome: Progressing

## 2020-01-28 NOTE — ASSESSMENT & PLAN NOTE
Admitting hemoglobin of 11 9  Hemoglobin today 10 4  Possibly related to dilution as patient was placed on IV fluids  Consider GI as other sources patient was vomiting darker colored material yesterday  Will continue to trend

## 2020-01-29 PROBLEM — R42 DIZZINESS: Status: ACTIVE | Noted: 2020-01-29

## 2020-01-29 LAB
ANION GAP SERPL CALCULATED.3IONS-SCNC: 10 MMOL/L (ref 4–13)
BUN SERPL-MCNC: 22 MG/DL (ref 5–25)
C DIFF TOX GENS STL QL NAA+PROBE: NEGATIVE
CALCIUM SERPL-MCNC: 7.9 MG/DL (ref 8.3–10.1)
CAMPYLOBACTER DNA SPEC NAA+PROBE: NORMAL
CHLORIDE SERPL-SCNC: 103 MMOL/L (ref 100–108)
CO2 SERPL-SCNC: 27 MMOL/L (ref 21–32)
CREAT SERPL-MCNC: 1.03 MG/DL (ref 0.6–1.3)
ERYTHROCYTE [DISTWIDTH] IN BLOOD BY AUTOMATED COUNT: 13.4 % (ref 11.6–15.1)
EST. AVERAGE GLUCOSE BLD GHB EST-MCNC: 232 MG/DL
FERRITIN SERPL-MCNC: 235 NG/ML (ref 8–388)
GFR SERPL CREATININE-BSD FRML MDRD: 93 ML/MIN/1.73SQ M
GLUCOSE SERPL-MCNC: 118 MG/DL (ref 65–140)
GLUCOSE SERPL-MCNC: 142 MG/DL (ref 65–140)
GLUCOSE SERPL-MCNC: 147 MG/DL (ref 65–140)
GLUCOSE SERPL-MCNC: 282 MG/DL (ref 65–140)
GLUCOSE SERPL-MCNC: 285 MG/DL (ref 65–140)
GLUCOSE SERPL-MCNC: 309 MG/DL (ref 65–140)
HBA1C MFR BLD: 9.7 % (ref 4.2–6.3)
HCT VFR BLD AUTO: 29 % (ref 36.5–49.3)
HGB BLD-MCNC: 9.6 G/DL (ref 12–17)
IRON SATN MFR SERPL: 20 %
IRON SERPL-MCNC: 58 UG/DL (ref 65–175)
MCH RBC QN AUTO: 32.3 PG (ref 26.8–34.3)
MCHC RBC AUTO-ENTMCNC: 33.1 G/DL (ref 31.4–37.4)
MCV RBC AUTO: 98 FL (ref 82–98)
PLATELET # BLD AUTO: 201 THOUSANDS/UL (ref 149–390)
PMV BLD AUTO: 12 FL (ref 8.9–12.7)
POTASSIUM SERPL-SCNC: 3.9 MMOL/L (ref 3.5–5.3)
RBC # BLD AUTO: 2.97 MILLION/UL (ref 3.88–5.62)
SALMONELLA DNA SPEC QL NAA+PROBE: NORMAL
SHIGA TOXIN STX GENE SPEC NAA+PROBE: NORMAL
SHIGELLA DNA SPEC QL NAA+PROBE: NORMAL
SODIUM SERPL-SCNC: 140 MMOL/L (ref 136–145)
TIBC SERPL-MCNC: 297 UG/DL (ref 250–450)
WBC # BLD AUTO: 5.26 THOUSAND/UL (ref 4.31–10.16)

## 2020-01-29 PROCEDURE — 87086 URINE CULTURE/COLONY COUNT: CPT | Performed by: PHYSICIAN ASSISTANT

## 2020-01-29 PROCEDURE — 82948 REAGENT STRIP/BLOOD GLUCOSE: CPT

## 2020-01-29 PROCEDURE — 87186 SC STD MICRODIL/AGAR DIL: CPT | Performed by: PHYSICIAN ASSISTANT

## 2020-01-29 PROCEDURE — 82784 ASSAY IGA/IGD/IGG/IGM EACH: CPT | Performed by: PHYSICIAN ASSISTANT

## 2020-01-29 PROCEDURE — 86255 FLUORESCENT ANTIBODY SCREEN: CPT | Performed by: PHYSICIAN ASSISTANT

## 2020-01-29 PROCEDURE — 82728 ASSAY OF FERRITIN: CPT | Performed by: PHYSICIAN ASSISTANT

## 2020-01-29 PROCEDURE — 83036 HEMOGLOBIN GLYCOSYLATED A1C: CPT | Performed by: PHYSICIAN ASSISTANT

## 2020-01-29 PROCEDURE — 87077 CULTURE AEROBIC IDENTIFY: CPT | Performed by: PHYSICIAN ASSISTANT

## 2020-01-29 PROCEDURE — 83516 IMMUNOASSAY NONANTIBODY: CPT | Performed by: PHYSICIAN ASSISTANT

## 2020-01-29 PROCEDURE — 85027 COMPLETE CBC AUTOMATED: CPT | Performed by: PHYSICIAN ASSISTANT

## 2020-01-29 PROCEDURE — C9113 INJ PANTOPRAZOLE SODIUM, VIA: HCPCS | Performed by: PHYSICIAN ASSISTANT

## 2020-01-29 PROCEDURE — 97110 THERAPEUTIC EXERCISES: CPT

## 2020-01-29 PROCEDURE — 83540 ASSAY OF IRON: CPT | Performed by: PHYSICIAN ASSISTANT

## 2020-01-29 PROCEDURE — 99232 SBSQ HOSP IP/OBS MODERATE 35: CPT | Performed by: PHYSICIAN ASSISTANT

## 2020-01-29 PROCEDURE — 97530 THERAPEUTIC ACTIVITIES: CPT

## 2020-01-29 PROCEDURE — 97116 GAIT TRAINING THERAPY: CPT

## 2020-01-29 PROCEDURE — 83550 IRON BINDING TEST: CPT | Performed by: PHYSICIAN ASSISTANT

## 2020-01-29 PROCEDURE — 80048 BASIC METABOLIC PNL TOTAL CA: CPT | Performed by: PHYSICIAN ASSISTANT

## 2020-01-29 RX ORDER — LOSARTAN POTASSIUM 25 MG/1
25 TABLET ORAL DAILY
Status: DISCONTINUED | OUTPATIENT
Start: 2020-01-29 | End: 2020-01-30

## 2020-01-29 RX ORDER — INSULIN GLARGINE 100 [IU]/ML
10 INJECTION, SOLUTION SUBCUTANEOUS EVERY MORNING
Status: DISCONTINUED | OUTPATIENT
Start: 2020-01-30 | End: 2020-01-30

## 2020-01-29 RX ORDER — PANTOPRAZOLE SODIUM 40 MG/1
40 TABLET, DELAYED RELEASE ORAL
Status: DISCONTINUED | OUTPATIENT
Start: 2020-01-29 | End: 2020-02-01 | Stop reason: HOSPADM

## 2020-01-29 RX ORDER — HYDRALAZINE HYDROCHLORIDE 20 MG/ML
10 INJECTION INTRAMUSCULAR; INTRAVENOUS EVERY 6 HOURS PRN
Status: DISCONTINUED | OUTPATIENT
Start: 2020-01-29 | End: 2020-02-01 | Stop reason: HOSPADM

## 2020-01-29 RX ADMIN — SODIUM CHLORIDE 100 ML/HR: 0.9 INJECTION, SOLUTION INTRAVENOUS at 07:48

## 2020-01-29 RX ADMIN — PANTOPRAZOLE SODIUM 40 MG: 40 TABLET, DELAYED RELEASE ORAL at 16:17

## 2020-01-29 RX ADMIN — AMLODIPINE BESYLATE 10 MG: 10 TABLET ORAL at 09:07

## 2020-01-29 RX ADMIN — PANTOPRAZOLE SODIUM 40 MG: 40 INJECTION, POWDER, FOR SOLUTION INTRAVENOUS at 09:07

## 2020-01-29 RX ADMIN — LOSARTAN POTASSIUM 25 MG: 25 TABLET, FILM COATED ORAL at 16:17

## 2020-01-29 RX ADMIN — INSULIN LISPRO 2 UNITS: 100 INJECTION, SOLUTION INTRAVENOUS; SUBCUTANEOUS at 12:36

## 2020-01-29 RX ADMIN — POTASSIUM CHLORIDE 40 MEQ: 1500 TABLET, EXTENDED RELEASE ORAL at 09:07

## 2020-01-29 RX ADMIN — FAMOTIDINE 20 MG: 20 TABLET, FILM COATED ORAL at 09:06

## 2020-01-29 RX ADMIN — INSULIN LISPRO 2 UNITS: 100 INJECTION, SOLUTION INTRAVENOUS; SUBCUTANEOUS at 22:58

## 2020-01-29 RX ADMIN — CITALOPRAM HYDROBROMIDE 40 MG: 20 TABLET ORAL at 09:07

## 2020-01-29 RX ADMIN — ONDANSETRON 4 MG: 2 INJECTION INTRAMUSCULAR; INTRAVENOUS at 13:09

## 2020-01-29 RX ADMIN — ATORVASTATIN CALCIUM 40 MG: 40 TABLET, FILM COATED ORAL at 16:17

## 2020-01-29 NOTE — PROGRESS NOTES
Progress Note - Urology  Blaise Cook 1965, 62 y o  male MRN: [id number]    Unit/Bed#: E2 -01 Encounter: [id number]    [place] (acute kidney injury) ([facility] [address] )  Assessment & Plan  Acute kidney injury with an elevated creatinine up to 3 2 on admission, down to normal 1 03 today with hydration and bladder decompression  There was no evidence of retention on bladder scans or CT scan prior to Lemus placement  No hydronephrosis  * Pneumaturia  Assessment & Plan  With concern for colovesicular fistula secondary to moderate air in his bladder without recent bladder instrumentation, he is about 7 weeks post colonoscopy  Lemus was placed after the time of the CT  CT cystogram results- negative for colovesical fistula  Unfortunately no urinalysis collected this hospitalization  Check culture for bacterial growth  This amount of air would be abnormally high amount for a gas forming bacteria UTI but should be ruled out  He has no LUTS suggestive of UTI prehospital  Continue to monitor off antibiotics and maintain Lemus for now  Bedside rounds performed with Rodney Vasquez RN  Discussed with Dr Alen Balderas    Subjective:   HPI 57-year-old male with pneumaturia for a few weeks  Started after colonoscopy last month  He is also having liquid bowel movements  Stool studies are negative  Cystogram performed last night, update results are negative for colovesical fistula  He is eager to have Lemus removed  Unfortunately no urine studies were collected prior to Lemus placement  His nausea and vomiting has resolved  He has no abdominal pain  Also of note patient was seen by [facility] urologist November 2019 for incidental finding of a left renal mass, per their notes patient has opted for surveillance alone for this 1 cm mass suspicious for RCC  Review of Systems   Constitutional: Negative  Respiratory: Negative  Cardiovascular: Negative  Gastrointestinal: Negative for abdominal pain, nausea and vomiting  Genitourinary: Positive for difficulty urinating (pneumaturia)  Negative for decreased urine volume, dysuria, flank pain, frequency, hematuria, testicular pain and urgency  +pneumaturia   Musculoskeletal: Negative  Objective:  Vitals: Blood pressure (!) 178/79, pulse 86, temperature (!) 96 °F (35 6 °C), temperature source Tympanic, resp  rate 16, height 5' 6" (1 676 m), weight 49 6 kg (109 lb 5 6 oz), SpO2 100 %  ,Body mass index is 17 65 kg/m²  Intake/Output Summary (Last 24 hours) at 1/29/2020 1436  Last data filed at 1/29/2020 0831  Gross per 24 hour   Intake 50 ml   Output 1375 ml   Net -1325 ml     Invasive Devices     Peripheral Intravenous Line            Peripheral IV 01/29/20 Right;Lateral Arm less than 1 day          Drain            Urethral Catheter Coude 16 Fr  less than 1 day              CT cystogram [[id number]] Collected: 01/29/20 1102   Order Status: Completed Updated: 01/29/20 1109   Narrative:     CT PELVIS CYSTOGRAM    INDICATION:   Vesical fistula  COMPARISON:  None  TECHNIQUE: CT examination of the pelvis was performed both prior to and after the instillation of contrast into the urinary bladder via perez catheter   Axial, sagittal, and coronal 2D reformatted images were created from the source data and submitted   for interpretation  Radiation dose length product (DLP) for this visit:  280 mGy-cm    This examination, like all CT scans performed in the [facility], was performed utilizing techniques to minimize radiation dose exposure, including the use of iterative   reconstruction and automated exposure control  Contrast instilled into urinary bladder:  IV Contrast:  15 mL of iohexol (OMNIPAQUE)  Enteric Contrast:  Enteric contrast was not administered  FINDINGS:    URINARY BLADDER:  Urinary bladder demonstrates no leakage of instilled contrast   Normal bladder contours are identified with no urinary bladder mass identified      VISUALIZED KIDNEYS/URETERS:  No significant abnormality identified in the partially imaged kidneys and ureters  REPRODUCTIVE ORGANS:  Unremarkable for patient's age  APPENDIX:  No findings to suggest appendicitis  VISUALIZED BOWEL:  Unremarkable  ABDOMINOPELVIC CAVITY:  No ascites or free intraperitoneal air  No lymphadenopathy      VISUALIZED VESSELS:  Unremarkable for patient's age      ABDOMINOPELVIC WALL/INGUINAL REGIONS: Unremarkable  OSSEOUS STRUCTURES:  No acute fracture or destructive osseous lesion  Impression:       No evidence of colovesical fistula identified  Physical Exam   Constitutional: He is oriented to person, place, and time  He appears well-developed and well-nourished  HENT:   Head: Normocephalic and atraumatic  Cardiovascular: Normal rate, regular rhythm and normal heart sounds  No murmur heard  Pulmonary/Chest: Effort normal and breath sounds normal    Abdominal: Soft  Bowel sounds are normal  There is no tenderness  There is no guarding  No suprapubic or CVA tenderness  Right mid abdominal surgical scar   Genitourinary:   Genitourinary Comments: Lemus catheter per urethra draining clear yellow urine   Musculoskeletal: Normal range of motion  He exhibits no edema  Neurological: He is alert and oriented to person, place, and time  Skin: Skin is warm and dry  Capillary refill takes less than 2 seconds  No pallor  Nursing note and vitals reviewed  History:    Past Medical History:   Diagnosis Date    Diabetes mellitus ([address] )     GERD (gastroesophageal reflux disease)     Hyperlipidemia     Hypertension     Migraine     Psychiatric disorder      Past Surgical History:   Procedure Laterality Date    CT CYSTOGRAM  1/28/2020    ESOPHAGOGASTRODUODENOSCOPY N/A 9/7/2016    Procedure: ESOPHAGOGASTRODUODENOSCOPY (EGD); Surgeon: Nola Pagan MD;  Location: [facility]; Service:      History reviewed  No pertinent family history    Social History     Socioeconomic History    Marital status: Single     Spouse name: None    Number of children: None    Years of education: None    Highest education level: None   Occupational History    None   Social Needs    Financial resource strain: None    Food insecurity:     Worry: None     Inability: None    Transportation needs:     Medical: None     Non-medical: None   Tobacco Use    Smoking status: Former Smoker    Smokeless tobacco: Never Used   Substance and Sexual Activity    Alcohol use: No    Drug use: Not Currently     Types: Marijuana    Sexual activity: None   Lifestyle    Physical activity:     Days per week: None     Minutes per session: None    Stress: None   Relationships    Social connections:     Talks on phone: None     Gets together: None     Attends Bahai service: None     Active member of club or organization: None     Attends meetings of clubs or organizations: None     Relationship status: None    Intimate partner violence:     Fear of current or ex partner: None     Emotionally abused: None     Physically abused: None     Forced sexual activity: None   Other Topics Concern    None   Social History Narrative    None       Labs:  Recent Labs     01/27/20  1854 01/28/20  0440 01/29/20  0616   WBC 6 49 6 41 5 26     Recent Labs     01/27/20 1854 01/28/20  0440 01/29/20  0616   HGB 11 9* 10 4* 9 6*       Recent Labs     01/27/20  1854 01/28/20  0440 01/29/20  0616   CREATININE 3 20* 2 87* 1 03         Oli Pizarro PA-C  Date: 1/29/2020 Time: 2:36 PM

## 2020-01-29 NOTE — PROGRESS NOTES
The pantoprazole has been converted to Oral per [facility] IV-to-PO Auto-Conversion Protocol for Adults as approved by the Pharmacy and Therapeutics Committee  The patient met all eligible criteria:  3 Age = 23years old   2) Received at least one dose of the IV form   3) Receiving at least one other scheduled oral/enteral medication   4) Tolerating an oral/enteral diet   and did not have any exclusions:   1) Critical care patient   2) Active GI bleed (IF assessing H2RAs or PPIs)   3) Continuous tube feeding (IF assessing cipro, doxycycline, levofloxacin, minocycline, rifampin, or voriconazole)   4) Receiving PO vancomycin (IF assessing metronidazole)   5) Persistent nausea and/or vomiting   6) Ileus or gastrointestinal obstruction   7) Ritika/nasogastric tube set for continuous suction   8) Specific order not to automatically convert to PO (in the order's comments or if discussed in the most recent Infectious Disease or primary team's progress notes)

## 2020-01-29 NOTE — PHYSICAL THERAPY NOTE
Physical Therapy Progress Note     01/29/20 1054   Pain Assessment   Pain Assessment No/denies pain   Pain Score No Pain   Restrictions/Precautions   Weight Bearing Precautions Per Order No   Other Precautions Fall Risk;Multiple lines   General   Chart Reviewed Yes   Response to Previous Treatment Patient reporting fatigue but able to participate  Family/Caregiver Present No   Subjective   Subjective Willing to participate in therapy this AM    Bed Mobility   Supine to Sit 5  Supervision   Additional items Assist x 1;HOB elevated; Bedrails;Leg ; Increased time required;Verbal cues;LE management   Sit to Supine 5  Supervision   Additional items Assist x 1;Bedrails;Leg ; Increased time required;Verbal cues;LE management   Transfers   Sit to Stand 5  Supervision   Additional items Assist x 1;Bedrails; Increased time required;Verbal cues   Stand to Sit 5  Supervision   Additional items Assist x 1;Bedrails; Increased time required;Verbal cues   Ambulation/Elevation   Gait pattern Decreased foot clearance; Forward Flexion; Short stride; Excessively slow; Inconsistent penny; Shuffling   Gait Assistance 4  Minimal assist  (CG)   Additional items Assist x 1;Verbal cues; Tactile cues   Assistive Device None   Distance 65'   Balance   Static Sitting Good   Dynamic Sitting Fair +   Static Standing Fair   Dynamic Standing Fair -   Ambulatory Fair -   Endurance Deficit   Endurance Deficit Yes   Endurance Deficit Description fatigue   Activity Tolerance   Activity Tolerance Patient tolerated treatment well   Nurse Made Aware Yes   Exercises   THR Supine;10 reps;AAROM; Bilateral   Assessment   Prognosis Fair   Problem List Decreased strength;Decreased range of motion;Decreased endurance;Decreased mobility; Impaired balance;Decreased skin integrity; Impaired vision; Impaired sensation   Assessment Pt  supine in bed upon my arrival  Pt  reporting fatigue, however agreeable to therapeutic intervention   Performance of HEP with cues provided for proper completion  Progressed with transfers being able to complete practicing proper technique with no noted LOB  Progressed with an amb  trial with use of no AD and Al/CG of therapist  Pt  would continue to benefit from use of RW for amb  trials at this time  Pt  returned to supine in bed at end of treatment session  PT will continue to recommend d/c home with family support and HHPT provided when medically stable  Barriers to Discharge Decreased caregiver support   Barriers to Discharge Comments Lives alone  Goals   Patient Goals To get better  STG Expiration Date 02/11/20   PT Treatment Day 2   Plan   Treatment/Interventions Functional transfer training;LE strengthening/ROM; Therapeutic exercise; Endurance training;Bed mobility;Gait training;Spoke to case management;Spoke to nursing   Progress Progressing toward goals   PT Frequency Other (Comment)  (3-5x/wk)   Recommendation   Recommendation Home PT; Home with family support   Equipment Recommended Walker  (RW)   PT - OK to Discharge Yes  (if d/c when medically stable )     Michael Dodson, PTA

## 2020-01-29 NOTE — ASSESSMENT & PLAN NOTE
Presenting with a Cr of 3 20  Baseline appears to be around 0 5-0 7    Improved to 1 0 w/ivf hydration  Repeat bmp in am

## 2020-01-29 NOTE — ASSESSMENT & PLAN NOTE
N / V / D x 1 month  Difficult time tolerating oral diet and will vomit frequently  Vomiting is dark brown/red in color  Diarrhea is purely liquid and brown per pt  CT abdomen with fluid-filled stomach and proximal small bowel with mild gastric and proximal small bowel wall thickening in keeping with nonspecific gastritis/enteritis  No bowel obstruction or bowel pneumatosis  c diff enteric pathogen panels negative  Patient reports 'he vomited' while I spoke with daughter disposition on phone however no vomiting was heard by myself while in patient room  Continue PPI b i d  Obtain occult blood for gastric contents if pt vomits  Gi checking for celiac and inflammatory process  -continue for clears for now    If tolerates will d/c ivf

## 2020-01-29 NOTE — ASSESSMENT & PLAN NOTE
Patient unable to provide any information regarding  According to records at [facility] patient's anticoagulated on Xarelto  Review of records reveal acute PE on CT chest 12/23/18  Xarelto held given concern for possible blood in vomit   Unclear if pt is even taking blood thinner at home as he does have hx of non compliance with meds

## 2020-01-29 NOTE — ASSESSMENT & PLAN NOTE
Occasion regimen was verified with patient's pharmacy-[facility]  He is supposed to be taking losartan 25 mg daily, amlodipine 10 mg daily  BP here 134/63    Will restart cozaar cautiously given improvement in kidney  Function

## 2020-01-29 NOTE — PLAN OF CARE
Problem: PHYSICAL THERAPY ADULT  Goal: Performs mobility at highest level of function for planned discharge setting  See evaluation for individualized goals  Description  Treatment/Interventions: Functional transfer training, LE strengthening/ROM, Therapeutic exercise, Endurance training, Patient/family training, Bed mobility, Gait training, Spoke to nursing, OT  Equipment Recommended: Walker(RW)       See flowsheet documentation for full assessment, interventions and recommendations  Outcome: Progressing  Note:   Prognosis: Fair  Problem List: Decreased strength, Decreased range of motion, Decreased endurance, Decreased mobility, Impaired balance, Decreased skin integrity, Impaired vision, Impaired sensation  Assessment: Pt  supine in bed upon my arrival  Pt  reporting fatigue, however agreeable to therapeutic intervention  Performance of HEP with cues provided for proper completion  Progressed with transfers being able to complete practicing proper technique with no noted LOB  Progressed with an amb  trial with use of no AD and Al/CG of therapist  Pt  would continue to benefit from use of RW for amb  trials at this time  Pt  returned to supine in bed at end of treatment session  PT will continue to recommend d/c home with family support and HHPT provided when medically stable  Barriers to Discharge: Decreased caregiver support  Barriers to Discharge Comments: Lives alone  Recommendation: Home PT, Home with family support     PT - OK to Discharge: Yes(if d/c when medically stable )    See flowsheet documentation for full assessment

## 2020-01-29 NOTE — ASSESSMENT & PLAN NOTE
Possibly due to fistula given recent colonsocopy at [facility] w/poor prep  -CT abdomen pelvis with mild circumferential bladder wall thickening possibly indicating cystitis  Moderate quantity of air within the bladder   -Ct fistulogram unremarkable  -s/p perez catheter insertion  -d/w urology who feels this is less likely  Fistula if CT negative  May need to consider emphysematous cystitis     -will appreciate urology recommendations regarding negative ct fistulogram

## 2020-01-29 NOTE — PROGRESS NOTES
Progress Note - Malena Aguilera 1965, 62 y o  male MRN: [id number]    Unit/Bed#: E2 -01 Encounter: [id number]    Primary Care Provider: Brissa Mckinley DO   Date and time admitted to hospital: 1/27/2020  4:54 PM        * Pneumaturia  Assessment & Plan  Possibly due to fistula given recent colonsocopy at [facility] w/poor prep  -CT abdomen pelvis with mild circumferential bladder wall thickening possibly indicating cystitis  Moderate quantity of air within the bladder   -Ct fistulogram unremarkable  -s/p perez catheter insertion  -d/w urology who feels this is less likely  Fistula if CT negative  May need to consider emphysematous cystitis  -will appreciate urology recommendations regarding negative ct fistulogram    Dizziness  Assessment & Plan  Presyncope w/blurry vision with standing  Suspect this is orthostatic vs htn urgency  No other focal neurodeficits  bs in 300s  Treat hyperglycemia, restart cozaar and treat bp  Check orthostatics q shift and continue to monitor    Nausea vomiting and diarrhea  Assessment & Plan  N / V / D x 1 month  Difficult time tolerating oral diet and will vomit frequently  Vomiting is dark brown/red in color  Diarrhea is purely liquid and brown per pt  CT abdomen with fluid-filled stomach and proximal small bowel with mild gastric and proximal small bowel wall thickening in keeping with nonspecific gastritis/enteritis  No bowel obstruction or bowel pneumatosis  c diff enteric pathogen panels negative  Patient reports 'he vomited' while I spoke with daughter disposition on phone however no vomiting was heard by myself while in patient room  Continue PPI b i d  Obtain occult blood for gastric contents if pt vomits  Gi checking for celiac and inflammatory process  -continue for clears for now  If tolerates will d/c ivf    Anemia  Assessment & Plan  Admitting hemoglobin of 11 9  Hemoglobin today 10 4    Possibly related to dilution as patient was placed on IV fluids  Consider GI as other sources patient was vomiting darker colored material yesterday, however this has not been confirmed w/hemoccult stool testing or emesis testing  Will continue to trend  AFRICA (acute kidney injury) [facility])  Assessment & Plan  Presenting with a Cr of 3 20  Baseline appears to be around 0 5-0 7  Improved to 1 0 w/ivf hydration  Repeat bmp in am    History of pulmonary embolus (PE)  Assessment & Plan  Patient unable to provide any information regarding  According to records at [facility] patient's anticoagulated on Xarelto  Review of records reveal acute PE on CT chest 12/23/18  Xarelto held given concern for possible blood in vomit  Unclear if pt is even taking blood thinner at home as he does have hx of non compliance with meds      Hyperlipidemia  Assessment & Plan  Continue statin    GERD (gastroesophageal reflux disease)  Assessment & Plan  Continue pepcid   PPI added     Diabetes mellitus [facility])  Assessment & Plan  Lab Results   Component Value Date    HGBA1C 9 7 (H) 01/29/2020     Recent Labs     01/28/20  2115 01/29/20  0711 01/29/20  1110 01/29/20  1211   POCGLU 269* 142* 309* 285*     Poorly controlled with a1c 9 7%  Reports taking lantus 20 units in the morning  NPO given possible GI bleed with vomiting  Continue ssi and poc bs and start lantus at 10 iu q am given poor appetite    Hypertension  Assessment & Plan  Occasion regimen was verified with patient's pharmacy-[facility]  He is supposed to be taking losartan 25 mg daily, amlodipine 10 mg daily  BP here 134/63  Will restart cozaar cautiously given improvement in kidney  Function        VTE Pharmacologic Prophylaxis:   Pharmacologic: Pharmacologic VTE Prophylaxis contraindicated due to possible bleed  Mechanical VTE Prophylaxis in Place: Yes    Patient Centered Rounds: I have performed bedside rounds with nursing staff today      Discussions with Specialists or Other Care Team Provider: urology danelle redmond    Education and Discussions with Family / Patient: dispo workup w/pt and pt's daughter    Time Spent for Care: 20 minutes  More than 50% of total time spent on counseling and coordination of care as described above  Current Length of Stay: 2 day(s)    Current Patient Status: Inpatient   Certification Statement: The patient will continue to require additional inpatient hospital stay due to dizziness and pneumaturia of unclear etiology    Discharge Plan:     Code Status: Level 1 - Full Code      Subjective:   Pt seen/examined  No events overnight per nursing  Pt is a difficult historian  He reports he vomited once the day prior which was unwitnessed per nursing  While in the room while on the phone w/pt's daughter, pt reports another episode of vomiting in bathroom, which was not heard  He notes nausea today and one watery stool  He also notes dizziness/blurry vision x1-2 months this is with standing and is consistent w/lightheadedness but no syncope  No vertigo/numbness/weakness  Objective:     Vitals:   Temp (24hrs), Av 1 °F (35 6 °C), Min:96 °F (35 6 °C), Max:96 2 °F (35 7 °C)    Temp:  [96 °F (35 6 °C)-96 2 °F (35 7 °C)] 96 °F (35 6 °C)  HR:  [79-86] 86  Resp:  [16] 16  BP: (133-178)/(61-79) 178/79  SpO2:  [100 %] 100 %  Body mass index is 17 65 kg/m²  Input and Output Summary (last 24 hours): Intake/Output Summary (Last 24 hours) at 2020 1414  Last data filed at 2020 0831  Gross per 24 hour   Intake 50 ml   Output 1375 ml   Net -1325 ml       Physical Exam:     Physical Exam   Constitutional: He appears well-developed  No distress  HENT:   Head: Normocephalic and atraumatic  Right Ear: External ear normal    Left Ear: External ear normal    Mouth/Throat: Oropharynx is clear and moist    Eyes: Conjunctivae are normal    Neck: Normal range of motion  Cardiovascular: Normal rate, regular rhythm and normal heart sounds   Exam reveals no gallop and no friction rub  No murmur heard  Pulmonary/Chest: Effort normal and breath sounds normal  No respiratory distress  He has no wheezes  He has no rales  Abdominal: Soft  He exhibits no distension and no mass  There is no tenderness  There is no guarding  Musculoskeletal: He exhibits no edema  Neurological: He is alert  Skin: Skin is warm and dry  He is not diaphoretic  Psychiatric: He has a normal mood and affect  Vitals reviewed  (  Be Sure to Include Physical Exam: Delete this entire line when you have entered your exam)    Additional Data:     Labs:    Results from last 7 days   Lab Units 01/29/20  0616  01/27/20  1854   WBC Thousand/uL 5 26   < > 6 49   HEMOGLOBIN g/dL 9 6*   < > 11 9*   HEMATOCRIT % 29 0*   < > 36 9   PLATELETS Thousands/uL 201   < > 263   NEUTROS PCT %  --   --  75   LYMPHS PCT %  --   --  18   MONOS PCT %  --   --  6   EOS PCT %  --   --  0    < > = values in this interval not displayed  Results from last 7 days   Lab Units 01/29/20  0616 01/28/20  0440   SODIUM mmol/L 140 142   POTASSIUM mmol/L 3 9 3 2*   CHLORIDE mmol/L 103 96*   CO2 mmol/L 27 29   BUN mg/dL 22 41*   CREATININE mg/dL 1 03 2 87*   ANION GAP mmol/L 10 17*   CALCIUM mg/dL 7 9* 8 7   ALBUMIN g/dL  --  3 7   TOTAL BILIRUBIN mg/dL  --  0 65   ALK PHOS U/L  --  93   ALT U/L  --  65   AST U/L  --  44   GLUCOSE RANDOM mg/dL 147* 147*     Results from last 7 days   Lab Units 01/27/20  2300   INR  0 97     Results from last 7 days   Lab Units 01/29/20  1211 01/29/20  1110 01/29/20  0711 01/28/20  2115 01/28/20  1610 01/28/20  1131 01/28/20  0555 01/27/20  2257 01/27/20  1739   POC GLUCOSE mg/dl 285* 309* 142* 269* 103 104 106 165* 151*     Results from last 7 days   Lab Units 01/29/20  0616   HEMOGLOBIN A1C % 9 7*               * I Have Reviewed All Lab Data Listed Above  * Additional Pertinent Lab Tests Reviewed:  All Labs Within Last 24 Hours Reviewed    Imaging:    Imaging Reports Reviewed Today Include:   Imaging Personally Reviewed by Myself Includes:      Recent Cultures (last 7 days):     Results from last 7 days   Lab Units 01/28/20  1418   C DIFF TOXIN B  Negative       Last 24 Hours Medication List:     Current Facility-Administered Medications:  acetaminophen 650 mg Oral Q6H PRN Vivi Adamson PA-C    amLODIPine 10 mg Oral Daily Vivi Adamson PA-C    atorvastatin 40 mg Oral Daily With [email or web address], DANETTE    citalopram 40 mg Oral Daily Vivi Adamson PA-C    famotidine 20 mg Oral Daily Vivi Adamson PA-C    hydrALAZINE 10 mg Intravenous Q6H PRN Annabelle Méndez PA-C    [START ON 1/30/2020] insulin glargine 10 Units Subcutaneous QAM Annabelle Méndez PA-C    insulin lispro 1-5 Units Subcutaneous TID AC Vivi Adamson PA-C    insulin lispro 1-5 Units Subcutaneous HS Vivi Adamson PA-C    losartan 25 mg Oral Daily Annabelle Méndez PA-C    ondansetron 4 mg Intravenous Q6H PRN Vivi Adamson PA-C    pantoprazole 40 mg Oral BID AC Vivi Adamson PA-C    sodium chloride 100 mL/hr Intravenous Continuous Vivi Adamson PA-C Last Rate: 100 mL/hr (01/29/20 0748)        Today, Patient Was Seen By: Eleonora Patel PA-C    ** Please Note: Dictation voice to text software may have been used in the creation of this document   **

## 2020-01-29 NOTE — ASSESSMENT & PLAN NOTE
Lab Results   Component Value Date    HGBA1C 9 7 (H) 01/29/2020     Recent Labs     01/28/20  2115 01/29/20  0711 01/29/20  1110 01/29/20  1211   POCGLU 269* 142* 309* 285*     Poorly controlled with a1c 9 7%  Reports taking lantus 20 units in the morning  NPO given possible GI bleed with vomiting     Continue ssi and poc bs and start lantus at 10 iu q am given poor appetite

## 2020-01-29 NOTE — ASSESSMENT & PLAN NOTE
Admitting hemoglobin of 11 9  Hemoglobin today 10 4  Possibly related to dilution as patient was placed on IV fluids  Consider GI as other sources patient was vomiting darker colored material yesterday, however this has not been confirmed w/hemoccult stool testing or emesis testing  Will continue to trend

## 2020-01-29 NOTE — PROGRESS NOTES
Patient Name: Joao Loyd  Patient MRN: [id number]  Date: 01/29/20  Service: [facility]    Subjective   Patient reports 2 episodes of diarrhea with incontinence since yesterday  No GI bleeding reported  He is tolerating clear liquids but thinks this is contributing to his diarrhea  He is afebrile  Vitals  Blood pressure (!) 178/79, pulse 86, temperature (!) 96 °F (35 6 °C), temperature source Tympanic, resp  rate 16, height 5' 6" (1 676 m), weight 49 6 kg (109 lb 5 6 oz), SpO2 100 %  Physical Exam:     General Appearance:    Awake, alert, oriented x3, no distress, well developed, well    nourished   Head:    Normocephalic without obvious abnormality   Eyes:    PERRL, conjunctiva/corneas clear, EOM's intact        Neck:   Supple, no adenopathy   Throat:   Mucous membranes moist   Lungs:     Clear to auscultation bilaterally, no wheezing or rhonchi   Heart:    Regular rate and rhythm, S1 and S2 normal, no murmur   Abdomen:     Soft, LUQ tenderness, non-distended  bowel sounds active  No masses, rebound or guarding  Extremities:   Extremities without edema   Psych  Derm:   Normal affect    No jaundice   Neurologic:   CNII-XII grossly intact   Speech intact         Laboratory Studies  Results from last 7 days   Lab Units 01/29/20  0616 01/28/20  0440 01/27/20  2300 01/27/20  1854   WBC Thousand/uL 5 26 6 41  --  6 49   HEMOGLOBIN g/dL 9 6* 10 4*  --  11 9*   HEMATOCRIT % 29 0* 32 7*  --  36 9   PLATELETS Thousands/uL 201 215  --  263   INR   --   --  0 97  --      Results from last 7 days   Lab Units 01/29/20  0616 01/28/20  0440 01/27/20  1854   SODIUM mmol/L 140 142 141   POTASSIUM mmol/L 3 9 3 2* 3 1*   CHLORIDE mmol/L 103 96* 95*   CO2 mmol/L 27 29 28   BUN mg/dL 22 41* 40*   CREATININE mg/dL 1 03 2 87* 3 20*   CALCIUM mg/dL 7 9* 8 7 9 7   ALBUMIN g/dL  --  3 7 4 2   TOTAL BILIRUBIN mg/dL  --  0 65 0 74   ALK PHOS U/L  --  93 112   ALT U/L  --  65 76   AST U/L  --  44 53*     C difficile negative  Stool enteric bacterial panel negative  TSH normal    Imaging and Other Studies      Inhouse Medications     Current Facility-Administered Medications:     acetaminophen (TYLENOL) tablet 650 mg, 650 mg, Oral, Q6H PRN    amLODIPine (NORVASC) tablet 10 mg, 10 mg, Oral, Daily, 10 mg at 01/29/20 0907    atorvastatin (LIPITOR) tablet 40 mg, 40 mg, Oral, Daily With Dinner, 40 mg at 01/28/20 1605    citalopram (CeleXA) tablet 40 mg, 40 mg, Oral, Daily, 40 mg at 01/29/20 0907    famotidine (PEPCID) tablet 20 mg, 20 mg, Oral, Daily, 20 mg at 01/29/20 0906    insulin lispro (HumaLOG) 100 units/mL subcutaneous injection 1-5 Units, 1-5 Units, Subcutaneous, TID AC **AND** Fingerstick Glucose (POCT), , , TID AC    insulin lispro (HumaLOG) 100 units/mL subcutaneous injection 1-5 Units, 1-5 Units, Subcutaneous, HS, 2 Units at 01/28/20 2217    ondansetron (ZOFRAN) injection 4 mg, 4 mg, Intravenous, Q6H PRN    pantoprazole (PROTONIX) injection 40 mg, 40 mg, Intravenous, Q12H SHERRY, 40 mg at 01/29/20 0907    sodium chloride 0 9 % infusion, 100 mL/hr, Intravenous, Continuous, 100 mL/hr at 01/29/20 0748      Assessment/Plan:    1  Pneumaturia with abnormal CT abdomen pelvis revealing mild circumferential bladder wall thickening/possible cystitis  Moderate quantity of air within the bladder without recent instrumentation or Lemus insertion  Question of colovesicular fistula  Urology planning CT cystogram   Cause of fistula could be related to underlying cancer, Crohn's disease or acute diverticulitis  Repeat colonoscopy in the setting of fistula not recommended with risk for complications  Consider eventual EGD and small bowel series as well as repeat colonoscopy  2  Chronic nausea, vomiting, nonbloody greasy/frothy diarrhea unclear etiology    CT abdomen/pelvis shows fluid-filled stomach and proximal small bowel with mild gastric and proximal small bowel wall thickening in keeping with nonspecific gastritis/enteritis  No bowel obstruction or bowel pneumatosis  Recent colonoscopy [facility] 12/5 was very poorly prepped with 1 tubular adenomatous polyp removed  Last EGD 2016 with severe ulcerative esophagitis, candidiasis, gastritis and duodenitis  Stool studies negative  Continue PPI  Check fecal elastase and celiac serologies  3  Recent pancreatitis felt secondary to Ozempic  Lipase within normal limits  No clinical evidence of acute pancreatitis  4  History of PE on Xarelto          Estefani Salas PA-C

## 2020-01-29 NOTE — ASSESSMENT & PLAN NOTE
Presyncope w/blurry vision with standing  Suspect this is orthostatic vs htn urgency  No other focal neurodeficits  bs in 300s      Treat hyperglycemia, restart cozaar and treat bp  Check orthostatics q shift and continue to monitor

## 2020-01-30 ENCOUNTER — TELEPHONE (OUTPATIENT)
Dept: OTHER | Facility: HOSPITAL | Age: 55
End: 2020-01-30

## 2020-01-30 PROBLEM — N17.9 AKI (ACUTE KIDNEY INJURY) (HCC): Status: RESOLVED | Noted: 2020-01-27 | Resolved: 2020-01-30

## 2020-01-30 PROBLEM — I95.1 ORTHOSTASIS: Status: ACTIVE | Noted: 2020-01-30

## 2020-01-30 LAB
ANION GAP SERPL CALCULATED.3IONS-SCNC: 8 MMOL/L (ref 4–13)
BASOPHILS # BLD AUTO: 0.02 THOUSANDS/ΜL (ref 0–0.1)
BASOPHILS NFR BLD AUTO: 0 % (ref 0–1)
BUN SERPL-MCNC: 12 MG/DL (ref 5–25)
CALCIUM SERPL-MCNC: 8 MG/DL (ref 8.3–10.1)
CHLORIDE SERPL-SCNC: 104 MMOL/L (ref 100–108)
CO2 SERPL-SCNC: 30 MMOL/L (ref 21–32)
CREAT SERPL-MCNC: 0.88 MG/DL (ref 0.6–1.3)
ENDOMYSIUM IGA SER QL: NEGATIVE
EOSINOPHIL # BLD AUTO: 0.03 THOUSAND/ΜL (ref 0–0.61)
EOSINOPHIL NFR BLD AUTO: 1 % (ref 0–6)
ERYTHROCYTE [DISTWIDTH] IN BLOOD BY AUTOMATED COUNT: 13.4 % (ref 11.6–15.1)
GFR SERPL CREATININE-BSD FRML MDRD: 111 ML/MIN/1.73SQ M
GLIADIN PEPTIDE IGA SER-ACNC: 2 UNITS (ref 0–19)
GLIADIN PEPTIDE IGG SER-ACNC: 2 UNITS (ref 0–19)
GLUCOSE SERPL-MCNC: 115 MG/DL (ref 65–140)
GLUCOSE SERPL-MCNC: 123 MG/DL (ref 65–140)
GLUCOSE SERPL-MCNC: 146 MG/DL (ref 65–140)
GLUCOSE SERPL-MCNC: 265 MG/DL (ref 65–140)
GLUCOSE SERPL-MCNC: 64 MG/DL (ref 65–140)
HCT VFR BLD AUTO: 29.5 % (ref 36.5–49.3)
HGB BLD-MCNC: 9.8 G/DL (ref 12–17)
IGA SERPL-MCNC: 195 MG/DL (ref 90–386)
IMM GRANULOCYTES # BLD AUTO: 0.02 THOUSAND/UL (ref 0–0.2)
IMM GRANULOCYTES NFR BLD AUTO: 0 % (ref 0–2)
LYMPHOCYTES # BLD AUTO: 1.21 THOUSANDS/ΜL (ref 0.6–4.47)
LYMPHOCYTES NFR BLD AUTO: 20 % (ref 14–44)
MCH RBC QN AUTO: 32.5 PG (ref 26.8–34.3)
MCHC RBC AUTO-ENTMCNC: 33.2 G/DL (ref 31.4–37.4)
MCV RBC AUTO: 98 FL (ref 82–98)
MONOCYTES # BLD AUTO: 0.63 THOUSAND/ΜL (ref 0.17–1.22)
MONOCYTES NFR BLD AUTO: 10 % (ref 4–12)
NEUTROPHILS # BLD AUTO: 4.12 THOUSANDS/ΜL (ref 1.85–7.62)
NEUTS SEG NFR BLD AUTO: 69 % (ref 43–75)
NRBC BLD AUTO-RTO: 0 /100 WBCS
OB PNL GAST: NEGATIVE
PLATELET # BLD AUTO: 196 THOUSANDS/UL (ref 149–390)
PMV BLD AUTO: 12 FL (ref 8.9–12.7)
POTASSIUM SERPL-SCNC: 4 MMOL/L (ref 3.5–5.3)
RBC # BLD AUTO: 3.02 MILLION/UL (ref 3.88–5.62)
SODIUM SERPL-SCNC: 142 MMOL/L (ref 136–145)
TTG IGA SER-ACNC: <2 U/ML (ref 0–3)
TTG IGG SER-ACNC: 2 U/ML (ref 0–5)
WBC # BLD AUTO: 6.03 THOUSAND/UL (ref 4.31–10.16)

## 2020-01-30 PROCEDURE — 82948 REAGENT STRIP/BLOOD GLUCOSE: CPT

## 2020-01-30 PROCEDURE — 80048 BASIC METABOLIC PNL TOTAL CA: CPT | Performed by: PHYSICIAN ASSISTANT

## 2020-01-30 PROCEDURE — 99232 SBSQ HOSP IP/OBS MODERATE 35: CPT | Performed by: PHYSICIAN ASSISTANT

## 2020-01-30 PROCEDURE — 85025 COMPLETE CBC W/AUTO DIFF WBC: CPT | Performed by: PHYSICIAN ASSISTANT

## 2020-01-30 PROCEDURE — 82271 OCCULT BLOOD OTHER SOURCES: CPT | Performed by: PHYSICIAN ASSISTANT

## 2020-01-30 RX ORDER — INSULIN GLARGINE 100 [IU]/ML
14 INJECTION, SOLUTION SUBCUTANEOUS EVERY MORNING
Status: DISCONTINUED | OUTPATIENT
Start: 2020-01-31 | End: 2020-02-01 | Stop reason: HOSPADM

## 2020-01-30 RX ADMIN — INSULIN LISPRO 2 UNITS: 100 INJECTION, SOLUTION INTRAVENOUS; SUBCUTANEOUS at 11:21

## 2020-01-30 RX ADMIN — CITALOPRAM HYDROBROMIDE 40 MG: 20 TABLET ORAL at 11:20

## 2020-01-30 RX ADMIN — ATORVASTATIN CALCIUM 40 MG: 40 TABLET, FILM COATED ORAL at 18:22

## 2020-01-30 RX ADMIN — PANTOPRAZOLE SODIUM 40 MG: 40 TABLET, DELAYED RELEASE ORAL at 18:22

## 2020-01-30 RX ADMIN — INSULIN GLARGINE 10 UNITS: 100 INJECTION, SOLUTION SUBCUTANEOUS at 11:21

## 2020-01-30 RX ADMIN — AMLODIPINE BESYLATE 10 MG: 10 TABLET ORAL at 11:20

## 2020-01-30 RX ADMIN — POLYETHYLENE GLYCOL 3350, SODIUM SULFATE ANHYDROUS, SODIUM BICARBONATE, SODIUM CHLORIDE, POTASSIUM CHLORIDE 4000 ML: 236; 22.74; 6.74; 5.86; 2.97 POWDER, FOR SOLUTION ORAL at 20:30

## 2020-01-30 RX ADMIN — FAMOTIDINE 20 MG: 20 TABLET, FILM COATED ORAL at 11:20

## 2020-01-30 RX ADMIN — LOSARTAN POTASSIUM 25 MG: 25 TABLET, FILM COATED ORAL at 11:20

## 2020-01-30 RX ADMIN — PANTOPRAZOLE SODIUM 40 MG: 40 TABLET, DELAYED RELEASE ORAL at 07:54

## 2020-01-30 RX ADMIN — ONDANSETRON 4 MG: 2 INJECTION INTRAMUSCULAR; INTRAVENOUS at 15:20

## 2020-01-30 NOTE — PROGRESS NOTES
Progress Note - Urology  Rashad Fee 1965, 62 y o  male MRN: [id number]    Unit/Bed#: E2 -01 Encounter: [id number]    AFRICA (acute kidney injury) ([facility] [address] )  Assessment & Plan  Acute kidney injury with an elevated creatinine up to 3 2 on admission, down to normal with hydration and bladder decompression  There was no evidence of retention on bladder scans or CT scan prior to Lemus placement  No hydronephrosis  Fully draining clear yellow urine  Was placed for charted retention but I do not see a volume for which was placed  Consider void trial tomorrow or as an outpatient early next week  * Pneumaturia  Assessment & Plan  With concern for colovesicular fistula secondary to moderate air in his bladder without recent bladder instrumentation, he is about 7 weeks post colonoscopy  Lemus was placed after the time of the CT  CT cystogram results- negative for colovesical fistula  Unfortunately no urinalysis collected this hospitalization  Check culture for bacterial growth  This amount of air would be abnormally high amount for a gas forming bacteria UTI but should be ruled out  He has no LUTS suggestive of UTI prehospital  Continue to monitor off antibiotics and maintain Lemus for now  Consider void trial tomorrow morning in the hospital or early next week in our office  Will arrange outpatient cystoscopy once urine results have returned and patient is catheter free  Bedside rounds performed with RN  Discussed with Dr Beba Barron  Urology will sign off but remain available for any further inpatient needs  Please feel free to call with questions or concerns  Subjective/Objective     Subjective:   CC: "I feel good today, a little nausea"  HPI:  Nik Abdul feels well today  He has some nausea but no vomiting  He continued to have liquid stools  Urethral Lemus draining clear yellow urine  ROS:  Review of Systems   Constitutional: Negative for activity change and appetite change  HENT: Negative for congestion and ear pain  Eyes: Negative for pain  Respiratory: Negative for cough and shortness of breath  Cardiovascular: Negative for chest pain and palpitations  Gastrointestinal: Negative for abdominal distention, abdominal pain, blood in stool, constipation, diarrhea and nausea  Genitourinary: Negative for difficulty urinating, dysuria, flank pain, hematuria, penile pain, penile swelling and scrotal swelling  Musculoskeletal: Negative for arthralgias and myalgias  Skin: Negative for rash  Allergic/Immunologic: Negative for immunocompromised state  Neurological: Negative for dizziness and headaches  Hematological: Negative for adenopathy  Does not bruise/bleed easily  Psychiatric/Behavioral: Negative for agitation  The patient is not nervous/anxious  Objective:  Vitals: Blood pressure 101/66, pulse 82, temperature (!) 96 5 °F (35 8 °C), temperature source Tympanic, resp  rate 16, height 5' 6" (1 676 m), weight 49 6 kg (109 lb 5 6 oz), SpO2 100 %  ,Body mass index is 17 65 kg/m²  Intake/Output Summary (Last 24 hours) at 1/30/2020 0820  Last data filed at 1/30/2020 0800  Gross per 24 hour   Intake 50 ml   Output 550 ml   Net -500 ml     Invasive Devices     Peripheral Intravenous Line            Peripheral IV 01/29/20 Right;Lateral Arm less than 1 day          Drain            Urethral Catheter Coude 16 Fr  1 day                Physical Exam   Constitutional: He is oriented to person, place, and time  He appears well-developed and well-nourished  He is cooperative  He does not appear ill  No distress  33-year-old male, resting comfortably in bed  No acute distress  Pleasant, chatty  HENT:   Head: Normocephalic and atraumatic  Moist mucous membranes  Eyes: Conjunctivae and EOM are normal    Neck: Normal range of motion  Neck supple  No tracheal deviation present  Cardiovascular: Normal rate, regular rhythm and normal heart sounds     No murmur heard   Pulmonary/Chest: Effort normal and breath sounds normal  No respiratory distress  He has no wheezes  Good airflow bilaterally on deep inspiration  Abdominal: Soft  Bowel sounds are normal  He exhibits no distension and no mass  There is no tenderness  Right upper quadrant open cholecystectomy scar  No tenderness rigidity rebound or guarding  Abdomen benign  Genitourinary:   Genitourinary Comments: Urethral perez draining clear yellow urine  Musculoskeletal: Normal range of motion  He exhibits no edema  Neurological: He is alert and oriented to person, place, and time  Skin: Skin is warm and dry  No rash noted  He is not diaphoretic  No erythema  No pallor  Psychiatric: He has a normal mood and affect  His behavior is normal  Judgment and thought content normal    Nursing note and vitals reviewed  History:    Past Medical History:   Diagnosis Date    Diabetes mellitus ([facility] [address] )     GERD (gastroesophageal reflux disease)     Hyperlipidemia     Hypertension     Migraine     Psychiatric disorder      Past Surgical History:   Procedure Laterality Date    CT CYSTOGRAM  1/28/2020    ESOPHAGOGASTRODUODENOSCOPY N/A 9/7/2016    Procedure: ESOPHAGOGASTRODUODENOSCOPY (EGD); Surgeon: Kelsey Patterson MD;  Location: [facility]; Service:      History reviewed  No pertinent family history    Social History     Socioeconomic History    Marital status: Single     Spouse name: None    Number of children: None    Years of education: None    Highest education level: None   Occupational History    None   Social Needs    Financial resource strain: None    Food insecurity:     Worry: None     Inability: None    Transportation needs:     Medical: None     Non-medical: None   Tobacco Use    Smoking status: Former Smoker    Smokeless tobacco: Never Used   Substance and Sexual Activity    Alcohol use: No    Drug use: Not Currently     Types: Marijuana    Sexual activity: None   Lifestyle    Physical activity:     Days per week: None     Minutes per session: None    Stress: None   Relationships    Social connections:     Talks on phone: None     Gets together: None     Attends [employer] service: None     Active member of club or organization: None     Attends meetings of clubs or organizations: None     Relationship status: None    Intimate partner violence:     Fear of current or ex partner: None     Emotionally abused: None     Physically abused: None     Forced sexual activity: None   Other Topics Concern    None   Social History Narrative    None       Labs:  Results from last 7 days   Lab Units 01/30/20 0453 01/29/20 0616 01/28/20 0440   WBC Thousand/uL 6 03 5 26 6 41   HEMOGLOBIN g/dL 9 8* 9 6* 10 4*   PLATELETS Thousands/uL 196 201 215     Results from last 7 days   Lab Units 01/30/20 0453 01/29/20 0616 01/28/20 0440 01/27/20  1854   SODIUM mmol/L 142 140 142 141   POTASSIUM mmol/L 4 0 3 9 3 2* 3 1*   CHLORIDE mmol/L 104 103 96* 95*   CO2 mmol/L 30 27 29 28   BUN mg/dL 12 22 41* 40*   CREATININE mg/dL 0 88 1 03 2 87* 3 20*   EGFR ml/min/1 73sq m 111 93 27 24   CALCIUM mg/dL 8 0* 7 9* 8 7 9 7   AST U/L  --   --  44 53*   ALT U/L  --   --  65 76   ALK PHOS U/L  --   --  93 112               Luis Snow PA-C  Date: 1/30/2020 Time: 8:20 AM

## 2020-01-30 NOTE — PROGRESS NOTES
Patient Name: Francesca Garcia  Patient MRN: [id number]  Date: 01/30/20  Service: [facility]    Subjective   Francesca Garcia is a 62 y o  male who was admitted with Pneumaturia  He stable today with Lemus in place  Patient denies: Chest pain, shortness of breath, fever, weight loss, rash, adenopathy  All others negative except as noted in HPI  Objective     Vitals  Blood pressure 137/57, pulse 81, temperature (!) 97 °F (36 1 °C), temperature source Tympanic, resp  rate 16, height 5' 6" (1 676 m), weight 49 6 kg (109 lb 5 6 oz), SpO2 99 %  ROS:  Patient denies: Chest pain, shortness of breath, fever, weight loss, rash, adenopathy  All others negative except as noted in HPI  General: Alert, no apparent distress  Eyes: No scleral icterus  ENT: MMM  Abdomen: Soft  Nontender  Nondistended  Bowel sounds present and normoactive  No hepatosplenomegaly  Skin: No jaundice  Neuro: Alert and oriented x3  Lemus in place      Laboratory Studies  Lab Results   Component Value Date    CREATININE 0 88 01/30/2020    BUN 12 01/30/2020    SODIUM 142 01/30/2020    K 4 0 01/30/2020     01/30/2020    CO2 30 01/30/2020    CALCIUM 8 0 (L) 01/30/2020    ALKPHOS 93 01/28/2020    AST 44 01/28/2020    ALT 65 01/28/2020     Lab Results   Component Value Date    WBC 6 03 01/30/2020    HGB 9 8 (L) 01/30/2020    HCT 29 5 (L) 01/30/2020     01/30/2020    MCV 98 01/30/2020     Lab Results   Component Value Date    PROTIME 13 0 01/27/2020    INR 0 97 01/27/2020       Inhouse Medications       Current Facility-Administered Medications:     acetaminophen (TYLENOL) tablet 650 mg, 650 mg, Oral, Q6H PRN    amLODIPine (NORVASC) tablet 10 mg, 10 mg, Oral, Daily, 10 mg at 01/29/20 0907    atorvastatin (LIPITOR) tablet 40 mg, 40 mg, Oral, Daily With Dinner, 40 mg at 01/29/20 1617    citalopram (CeleXA) tablet 40 mg, 40 mg, Oral, Daily, 40 mg at 01/29/20 0907    famotidine (PEPCID) tablet 20 mg, 20 mg, Oral, Daily, 20 mg at 01/29/20 0906    hydrALAZINE (APRESOLINE) injection 10 mg, 10 mg, Intravenous, Q6H PRN    insulin glargine (LANTUS) subcutaneous injection 10 Units 0 1 mL, 10 Units, Subcutaneous, QAM    insulin lispro (HumaLOG) 100 units/mL subcutaneous injection 1-5 Units, 1-5 Units, Subcutaneous, TID AC, 2 Units at 01/29/20 1236 **AND** Fingerstick Glucose (POCT), , , TID AC    insulin lispro (HumaLOG) 100 units/mL subcutaneous injection 1-5 Units, 1-5 Units, Subcutaneous, HS, 2 Units at 01/29/20 2258    losartan (COZAAR) tablet 25 mg, 25 mg, Oral, Daily, 25 mg at 01/29/20 1617    ondansetron (ZOFRAN) injection 4 mg, 4 mg, Intravenous, Q6H PRN, 4 mg at 01/29/20 1309    pantoprazole (PROTONIX) EC tablet 40 mg, 40 mg, Oral, BID AC, 40 mg at 01/30/20 5674      Assessment/Plan:  No evidence of colovesical fistula by urologic evaluation will need to consider colorectal consult    Eliane Closs, MD

## 2020-01-30 NOTE — CONSULTS
Consult colon and rectal surgery: This is a 57-year-old male who presents with findings consistent with a colovesicular fistula  This patient as described above social of pneumaturia  Patient has recently had a colonoscopy at [facility] with the findings were obscured because of a poor prep  Patient is a very poor historian but does describe longstanding LLQ abdominal Pain  CT Scan on 01/28/2020 has revealed air in the bladder with circumferential bladder wall thickening  with no mention of Diverticular Disease  CT Cystogram has revealed no fistula  Patient has a Hgb 9 8, Hct 29 5  WBC is 6 03   Creatinine 0 88    MEDS:    acetaminophen 650 mg Oral Q6H PRN Vivi Piger, PA-FRITZ   amLODIPine 10 mg Oral Daily Vivi Piger, PA-FRITZ   atorvastatin 40 mg Oral Daily With Coy Primrose, PA-FRITZ   citalopram 40 mg Oral Daily Vivi Piger, PA-FRITZ   famotidine 20 mg Oral Daily Vivi Pigeave, PA-FRITZ   hydrALAZINE 10 mg Intravenous Q6H PRN Annabelle Méndez PA-C   [START ON 1/31/2020] insulin glargine 14 Units Subcutaneous QAM Annabelle Méndez PA-C   insulin lispro 1-5 Units Subcutaneous TID AC Vivi Adamson PA-C   insulin lispro 1-5 Units Subcutaneous HS Vivi Adamson PA-C   ondansetron 4 mg Intravenous Q6H PRN Vivi Adamson PA-C   pantoprazole 40 mg Oral BID AC      PMH:     GERD, HTN, DM, Vertigo abdominal pain trauma    PE:    Awake alert and oriented x3    HEENT:  MATTHEW EOMI    Neck:  Supple    Lungs:  Clear    Abdomen:  Soft, vague tenderness left lower quadrant    Neurologic:  Grossly intact    Extremities:  Full range of motion    Rectal:  Deferred to colonoscopy    Impression:  Abdominal pain, pneumaturia etiology uncertain, anemia    Recommendation:  Plan bowel prep and colonoscopy

## 2020-01-30 NOTE — ASSESSMENT & PLAN NOTE
Admitting hemoglobin of 11 9  Hemoglobin 9 8 and appears stable from 9 6-10 4 over the last 2-3 days without any witness bleeding from GI tract  Possibly related to dilution as patient was placed on IV fluids    Consider GI as other sources patient was vomiting darker colored material yesterday, however this has not been confirmed w/hemoccult stool testing or emesis testing  Will continue to trend cbc daily

## 2020-01-30 NOTE — ASSESSMENT & PLAN NOTE
Presenting with a Cr of 3 20  Baseline appears to be around 0 5-0 7    Improved to 0 88 with IV fluid hydration  IV fluids discontinued continue to encourage oral intake

## 2020-01-30 NOTE — TELEPHONE ENCOUNTER
Patient seen inpatient [place] with suspicion of a colovesicular fistula  CT cystogram was negative for fistula  He may be discharged home with a catheter and needle void trial early next week, no sooner than February 3, 2020  He will require outpatient cystoscopy, prefers [facility] office, Dr Dariel Fernandez saw him inpatient      Janeen Shaikh PA-C  Date: 1/30/2020 Time: 8:21 AM

## 2020-01-30 NOTE — ASSESSMENT & PLAN NOTE
Lab Results   Component Value Date    HGBA1C 9 7 (H) 01/29/2020     Recent Labs     01/29/20  1637 01/29/20  2248 01/30/20  0723 01/30/20  1102   POCGLU 118 282* 146* 265*     Poorly controlled with a1c 9 7%  Reports taking lantus 20 units in the morning    Started on lantus 10 iu qam with poor appetite will uptitrate to 14 iu qam with reintroduction of surgical soft diet

## 2020-01-30 NOTE — ASSESSMENT & PLAN NOTE
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

## 2020-01-30 NOTE — ASSESSMENT & PLAN NOTE
Presyncope w/blurry vision with standing  -likely secondary to orthostasis given 30 mmhg drop of systolic BP overnight   -Status post IV fluids but on supine hypertensive medications  -hold cozaar 25mg po daily which was restarted for accelerated blood pressure yesterday    Continue norvasc 10mg po daily  -Continue to monitor as patient is not dizzy today  -if remains orthostatic will need to consider down titration antihypertensives

## 2020-01-30 NOTE — ASSESSMENT & PLAN NOTE
N / V / D x 1 month  Difficult time tolerating oral diet and will vomit frequently however has not had any witnessed vomiting/diarrhea per staffing  Vomiting is dark brown/red in color  Diarrhea is purely liquid and brown per pt  CT abdomen with fluid-filled stomach and proximal small bowel with mild gastric and proximal small bowel wall thickening in keeping with nonspecific gastritis/enteritis  No bowel obstruction or bowel pneumatosis  c diff enteric pathogen panels negative  Patient reports 'he vomited' while I spoke with daughter disposition on phone however no vomiting was heard by myself while in patient room  Continue PPI b i d    Obtain occult blood for gastric contents if pt vomits  Gi checking for celiac and inflammatory process  -tolerated clear liquids will cautiously upgrade to surgical soft diabetic diet

## 2020-01-30 NOTE — SOCIAL WORK
CM met with pt at bedside to discuss discharge needs  Pt lives at [facility] apartment alone  ADL's are completed independently  Pt uses a SPC as needed  He reports [facility] has DME for residents  He is going to inquire about getting a RW  PCP identified as Dr Debi Higuera  Pt has a follow up appt with her next week  Pharmacy identified as [facility]  Pt denies POA; reports he has 7 children  His dtr Bhavesh Esquivel has been assisting pt at home  She will provide transportation as needed  Pt currently active with [facility] for PT/SN  This service will resume at discharge  Pt asking about having Glucerna's delivetred to his home  He stated he used to get it from [facility]  CM will call pharmacy to see if they can accommodate  No other needs expressed or identified  Dtr will transport home when pt is medically stable  CM will continue to follow as needed

## 2020-01-30 NOTE — ASSESSMENT & PLAN NOTE
Occasion regimen was verified with patient's pharmacy-[facility]  He is supposed to be taking losartan 25 mg daily, amlodipine 10 mg daily    Cozaar was restarted however prior to reinstatement pt was found to have orthostasis  Hold cozaar for now continue norvasc and monitor closely given orthostasis

## 2020-01-30 NOTE — PROGRESS NOTES
Progress Note - Rashad Fee 1965, 62 y o  male MRN: [id number]    Unit/Bed#: E2 -01 Encounter: [id number]    Primary Care Provider: Drew Pope DO   Date and time admitted to hospital: 1/27/2020  4:54 PM        * Pneumaturia  Assessment & Plan  Etiology unclear  May be due to fistula given recent colonsocopy at [facility] w/poor prep    verses emphysematous cystitis  -CT abdomen pelvis with mild circumferential bladder wall thickening possibly indicating cystitis  Moderate quantity of air within the bladder   -Ct fistulogram unremarkable  -s/p perez catheter insertion and per discussion with Urology DANETTE Villaseñor Rahul can attempt voiding trial at this time  -f/u ucx for 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 cystitis off abx  -appreciate GI recommendations for 2nd opinion with colorectal surgery for evaluation for consideration of additional diagnostics given possibility of false-negative of CT fistulogram  -consult colorectal    Dizziness  Assessment & Plan  Presyncope w/blurry vision with standing  -likely secondary to orthostasis given 30 mmhg drop of systolic BP overnight   -Status post IV fluids but on supine hypertensive medications  -hold cozaar 25mg po daily which was restarted for accelerated blood pressure yesterday  Continue norvasc 10mg po daily  -Continue to monitor as patient is not dizzy today  -if remains orthostatic will need to consider down titration antihypertensives      Nausea vomiting and diarrhea  Assessment & Plan  N / V / D x 1 month  Difficult time tolerating oral diet and will vomit frequently however has not had any witnessed vomiting/diarrhea per staffing  Vomiting is dark brown/red in color  Diarrhea is purely liquid and brown per pt  CT abdomen with fluid-filled stomach and proximal small bowel with mild gastric and proximal small bowel wall thickening in keeping with nonspecific gastritis/enteritis  No bowel obstruction or bowel pneumatosis    c diff enteric pathogen panels negative  Patient reports 'he vomited' while I spoke with daughter disposition on phone however no vomiting was heard by myself while in patient room  Continue PPI b i d  Obtain occult blood for gastric contents if pt vomits  Gi checking for celiac and inflammatory process  -tolerated clear liquids will cautiously upgrade to surgical soft diabetic diet    Anemia  Assessment & Plan  Admitting hemoglobin of 11 9  Hemoglobin 9 8 and appears stable from 9 6-10 4 over the last 2-3 days without any witness bleeding from GI tract  Possibly related to dilution as patient was placed on IV fluids  Consider GI as other sources patient was vomiting darker colored material yesterday, however this has not been confirmed w/hemoccult stool testing or emesis testing  Will continue to trend cbc daily    [place] (acute kidney injury) (HCC)-resolved as of 1/30/2020  Assessment & Plan  Presenting with a Cr of 3 20  Baseline appears to be around 0 5-0 7  Improved to 0 88 with IV fluid hydration  IV fluids discontinued continue to encourage oral intake    Hyperlipidemia  Assessment & Plan  Continue statin    GERD (gastroesophageal reflux disease)  Assessment & Plan  Continue pepcid   PPI added     Diabetes mellitus [facility])  Assessment & Plan  Lab Results   Component Value Date    HGBA1C 9 7 (H) 01/29/2020     Recent Labs     01/29/20  1637 01/29/20  2248 01/30/20  0723 01/30/20  1102   POCGLU 118 282* 146* 265*     Poorly controlled with a1c 9 7%  Reports taking lantus 20 units in the morning  Started on lantus 10 iu qam with poor appetite will uptitrate to 14 iu qam with reintroduction of surgical soft diet    Hypertension  Assessment & Plan  Occasion regimen was verified with patient's pharmacy-[facility]  He is supposed to be taking losartan 25 mg daily, amlodipine 10 mg daily    Cozaar was restarted however prior to reinstatement pt was found to have orthostasis  Hold cozaar for now continue norvasc and monitor closely given orthostasis        VTE Pharmacologic Prophylaxis:   Pharmacologic: Pharmacologic VTE Prophylaxis contraindicated due to possible gib  Mechanical VTE Prophylaxis in Place: Yes    Patient Centered Rounds: I have performed bedside rounds with nursing staff today  Discussions with Specialists or Other Care Team Provider:     Education and Discussions with Family / Patient: dispo workup w/pt and pt's daughter True Lima by phone    Time Spent for Care: 20 minutes  More than 50% of total time spent on counseling and coordination of care as described above  Current Length of Stay: 3 day(s)    Current Patient Status: Inpatient   Certification Statement: The patient will continue to require additional inpatient hospital stay due to pneumaturia    Discharge Plan: pending ucx and colorectal eval   Possible d/c in next 24-48h unless orthostasis is still symptomatic    Code Status: Level 1 - Full Code      Subjective:   Pt seen/examined  No events overnight  Pt reports no n/v/f/c today  No stools since yesterday  No lightheadedness but hasn't been getting up much  Objective:     Vitals:   Temp (24hrs), Av 3 °F (36 3 °C), Min:96 5 °F (35 8 °C), Max:98 5 °F (36 9 °C)    Temp:  [96 5 °F (35 8 °C)-98 5 °F (36 9 °C)] 97 °F (36 1 °C)  HR:  [81-83] 81  Resp:  [16-17] 16  BP: (101-137)/(57-88) 137/57  SpO2:  [99 %-100 %] 99 %  Body mass index is 17 65 kg/m²  Input and Output Summary (last 24 hours): Intake/Output Summary (Last 24 hours) at 2020 1305  Last data filed at 2020 0800  Gross per 24 hour   Intake --   Output 550 ml   Net -550 ml       Physical Exam:     Physical Exam   Constitutional: He appears well-developed  No distress  HENT:   Head: Normocephalic and atraumatic  Right Ear: External ear normal    Left Ear: External ear normal    Neck: Normal range of motion  Cardiovascular: Normal rate, regular rhythm and normal heart sounds   Exam reveals no gallop and no friction rub    No murmur heard  Pulmonary/Chest: Effort normal and breath sounds normal  No respiratory distress  He has no wheezes  He has no rales  Abdominal: Soft  He exhibits no distension and no mass  There is no tenderness  There is no guarding  Genitourinary:   Genitourinary Comments: Lemus catheter in place without any periuretheral erythema/discharge   Musculoskeletal: He exhibits no edema  Neurological: He is alert  Skin: Skin is warm and dry  He is not diaphoretic  Vitals reviewed  (  Be Sure to Include Physical Exam: Delete this entire line when you have entered your exam)    Additional Data:     Labs:    Results from last 7 days   Lab Units 01/30/20  0453   WBC Thousand/uL 6 03   HEMOGLOBIN g/dL 9 8*   HEMATOCRIT % 29 5*   PLATELETS Thousands/uL 196   NEUTROS PCT % 69   LYMPHS PCT % 20   MONOS PCT % 10   EOS PCT % 1     Results from last 7 days   Lab Units 01/30/20  0453  01/28/20  0440   SODIUM mmol/L 142   < > 142   POTASSIUM mmol/L 4 0   < > 3 2*   CHLORIDE mmol/L 104   < > 96*   CO2 mmol/L 30   < > 29   BUN mg/dL 12   < > 41*   CREATININE mg/dL 0 88   < > 2 87*   ANION GAP mmol/L 8   < > 17*   CALCIUM mg/dL 8 0*   < > 8 7   ALBUMIN g/dL  --   --  3 7   TOTAL BILIRUBIN mg/dL  --   --  0 65   ALK PHOS U/L  --   --  93   ALT U/L  --   --  65   AST U/L  --   --  44   GLUCOSE RANDOM mg/dL 123   < > 147*    < > = values in this interval not displayed  Results from last 7 days   Lab Units 01/27/20  2300   INR  0 97     Results from last 7 days   Lab Units 01/30/20  1102 01/30/20  0723 01/29/20  2248 01/29/20  1637 01/29/20  1211 01/29/20  1110 01/29/20  0711 01/28/20  2115 01/28/20  1610 01/28/20  1131 01/28/20  0555 01/27/20  2257   POC GLUCOSE mg/dl 265* 146* 282* 118 285* 309* 142* 269* 103 104 106 165*     Results from last 7 days   Lab Units 01/29/20  0616   HEMOGLOBIN A1C % 9 7*               * I Have Reviewed All Lab Data Listed Above  * Additional Pertinent Lab Tests Reviewed:  All Labs Within Last 24 Hours Reviewed    Imaging:    Imaging Reports Reviewed Today Include:   Imaging Personally Reviewed by Myself Includes:      Recent Cultures (last 7 days):     Results from last 7 days   Lab Units 01/28/20  1418   C DIFF TOXIN B  Negative       Last 24 Hours Medication List:     Current Facility-Administered Medications:  acetaminophen 650 mg Oral Q6H PRN Vivi Adamson PA-C   amLODIPine 10 mg Oral Daily Vivi Adamson PA-C   atorvastatin 40 mg Oral Daily With [employer], DANETTE   citalopram 40 mg Oral Daily Vivi Adamson PA-C   famotidine 20 mg Oral Daily Vivi Adamson PA-C   hydrALAZINE 10 mg Intravenous Q6H PRN Annabelle Méndez PA-C   [START ON 1/31/2020] insulin glargine 14 Units Subcutaneous QAM Annabelle Méndez PA-C   insulin lispro 1-5 Units Subcutaneous TID AC Vivi Adamson PA-C   insulin lispro 1-5 Units Subcutaneous HS Vivi Adamson PA-C   ondansetron 4 mg Intravenous Q6H PRN Vivi Adamson PA-C   pantoprazole 40 mg Oral BID AC Vivi Adamson PA-C        Today, Patient Was Seen By: Liliane Pablo PA-C    ** Please Note: Dictation voice to text software may have been used in the creation of this document   **

## 2020-01-31 ENCOUNTER — ANESTHESIA (INPATIENT)
Dept: GASTROENTEROLOGY | Facility: HOSPITAL | Age: 55
DRG: 469 | End: 2020-01-31
Payer: COMMERCIAL

## 2020-01-31 ENCOUNTER — ANESTHESIA EVENT (INPATIENT)
Dept: GASTROENTEROLOGY | Facility: HOSPITAL | Age: 55
DRG: 469 | End: 2020-01-31
Payer: COMMERCIAL

## 2020-01-31 ENCOUNTER — APPOINTMENT (INPATIENT)
Dept: GASTROENTEROLOGY | Facility: HOSPITAL | Age: 55
DRG: 469 | End: 2020-01-31
Attending: SURGERY
Payer: COMMERCIAL

## 2020-01-31 LAB
BACTERIA UR CULT: ABNORMAL
GLUCOSE SERPL-MCNC: 339 MG/DL (ref 65–140)
GLUCOSE SERPL-MCNC: 68 MG/DL (ref 65–140)
GLUCOSE SERPL-MCNC: 93 MG/DL (ref 65–140)
GLUCOSE SERPL-MCNC: 96 MG/DL (ref 65–140)

## 2020-01-31 PROCEDURE — 0DBL8ZX EXCISION OF TRANSVERSE COLON, VIA NATURAL OR ARTIFICIAL OPENING ENDOSCOPIC, DIAGNOSTIC: ICD-10-PCS | Performed by: SURGERY

## 2020-01-31 PROCEDURE — 0DBM8ZX EXCISION OF DESCENDING COLON, VIA NATURAL OR ARTIFICIAL OPENING ENDOSCOPIC, DIAGNOSTIC: ICD-10-PCS | Performed by: SURGERY

## 2020-01-31 PROCEDURE — 97530 THERAPEUTIC ACTIVITIES: CPT

## 2020-01-31 PROCEDURE — 82948 REAGENT STRIP/BLOOD GLUCOSE: CPT

## 2020-01-31 PROCEDURE — 99232 SBSQ HOSP IP/OBS MODERATE 35: CPT | Performed by: PHYSICIAN ASSISTANT

## 2020-01-31 PROCEDURE — 0DBK8ZX EXCISION OF ASCENDING COLON, VIA NATURAL OR ARTIFICIAL OPENING ENDOSCOPIC, DIAGNOSTIC: ICD-10-PCS | Performed by: SURGERY

## 2020-01-31 PROCEDURE — 88305 TISSUE EXAM BY PATHOLOGIST: CPT | Performed by: PATHOLOGY

## 2020-01-31 PROCEDURE — 97116 GAIT TRAINING THERAPY: CPT

## 2020-01-31 RX ORDER — SODIUM CHLORIDE 9 MG/ML
125 INJECTION, SOLUTION INTRAVENOUS CONTINUOUS
Status: DISCONTINUED | OUTPATIENT
Start: 2020-01-31 | End: 2020-01-31

## 2020-01-31 RX ORDER — BACLOFEN 10 MG/1
10 TABLET ORAL 3 TIMES DAILY
Status: DISCONTINUED | OUTPATIENT
Start: 2020-01-31 | End: 2020-02-01 | Stop reason: HOSPADM

## 2020-01-31 RX ORDER — PROPOFOL 10 MG/ML
INJECTION, EMULSION INTRAVENOUS AS NEEDED
Status: DISCONTINUED | OUTPATIENT
Start: 2020-01-31 | End: 2020-01-31 | Stop reason: SURG

## 2020-01-31 RX ADMIN — INSULIN LISPRO 3 UNITS: 100 INJECTION, SOLUTION INTRAVENOUS; SUBCUTANEOUS at 20:48

## 2020-01-31 RX ADMIN — PROPOFOL 50 MG: 10 INJECTION, EMULSION INTRAVENOUS at 15:56

## 2020-01-31 RX ADMIN — FAMOTIDINE 20 MG: 20 TABLET, FILM COATED ORAL at 09:26

## 2020-01-31 RX ADMIN — PANTOPRAZOLE SODIUM 40 MG: 40 TABLET, DELAYED RELEASE ORAL at 05:59

## 2020-01-31 RX ADMIN — CEFTRIAXONE SODIUM 1000 MG: 10 INJECTION, POWDER, FOR SOLUTION INTRAVENOUS at 12:54

## 2020-01-31 RX ADMIN — SODIUM CHLORIDE: 0.9 INJECTION, SOLUTION INTRAVENOUS at 15:40

## 2020-01-31 RX ADMIN — PROPOFOL 50 MG: 10 INJECTION, EMULSION INTRAVENOUS at 16:03

## 2020-01-31 RX ADMIN — AMLODIPINE BESYLATE 10 MG: 10 TABLET ORAL at 09:26

## 2020-01-31 RX ADMIN — SODIUM CHLORIDE 500 ML: 0.9 INJECTION, SOLUTION INTRAVENOUS at 14:00

## 2020-01-31 RX ADMIN — PROPOFOL 50 MG: 10 INJECTION, EMULSION INTRAVENOUS at 15:52

## 2020-01-31 RX ADMIN — ATORVASTATIN CALCIUM 40 MG: 40 TABLET, FILM COATED ORAL at 17:28

## 2020-01-31 RX ADMIN — PROPOFOL 50 MG: 10 INJECTION, EMULSION INTRAVENOUS at 16:07

## 2020-01-31 RX ADMIN — PROPOFOL 50 MG: 10 INJECTION, EMULSION INTRAVENOUS at 15:59

## 2020-01-31 RX ADMIN — BACLOFEN 10 MG: 10 TABLET ORAL at 20:47

## 2020-01-31 RX ADMIN — PANTOPRAZOLE SODIUM 40 MG: 40 TABLET, DELAYED RELEASE ORAL at 17:27

## 2020-01-31 RX ADMIN — PROPOFOL 50 MG: 10 INJECTION, EMULSION INTRAVENOUS at 15:47

## 2020-01-31 RX ADMIN — CITALOPRAM HYDROBROMIDE 40 MG: 20 TABLET ORAL at 09:26

## 2020-01-31 NOTE — PROGRESS NOTES
Progress Note - Ray De La O 1965, 62 y o  male MRN: [id number]    Unit/Bed#: E2 -01 Encounter: [id number]    Primary Care Provider: Maria Eugenia Comer DO   Date and time admitted to hospital: 1/27/2020  4:54 PM        * Pneumaturia  Assessment & Plan  Etiology unclear  May be due to fistula given recent colonsocopy at [facility] w/poor prep    verses emphysematous cystitis given >100K e  Coli in urine and improvement in s/sx today  -CT abdomen pelvis with mild circumferential bladder wall thickening possibly indicating cystitis  Moderate quantity of air within the bladder   -Ct fistulogram unremarkable  -s/p perez catheter insertion and per discussion with Urology DANETTE James can attempt voiding trial at this time  -f/u ucx for 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 cystitis off abx     -pt for repeat colonoscopy with colorectal surgery today  Start iv rocephin will need 5 days of abx awaiting sensitivities    Dizziness  Assessment & Plan  Presyncope w/blurry vision with standing  This is persistent and likely due to mild orthostasis  -likely secondary to orthostasis given 30 mmhg drop of systolic BP overnight  Will give additional 500cc bolus of NS over 5 hours  Continue norvasc 10mg po dailyfor supine hypertension continue to hold cozaar  Defer midodrine given mild bp drop and encourage supportive care       Nausea vomiting and diarrhea  Assessment & Plan  N / V / D x 1 month  Difficult time tolerating oral diet and will vomit frequently however has not had any witnessed vomiting/diarrhea per staffing  Vomiting is dark brown/red in color  Diarrhea is purely liquid and brown per pt  CT abdomen with fluid-filled stomach and proximal small bowel with mild gastric and proximal small bowel wall thickening in keeping with nonspecific gastritis/enteritis  celiac panel negative  c diff enteric pathogen panels negative      Patient reports 'he vomited' while I spoke with daughter disposition on phone however no vomiting was heard by myself while in patient room  This is improved some nausea but no true vomiting today and no bm in 2 days  Continue PPI b i d  Pending colonoscopy results possible d/c today vs tomorrow w/home vna    Anemia  Assessment & Plan  Admitting hemoglobin of 11 9  Hemoglobin 9 8 and appears stable from 9 6-10 4 over the last 2-3 days without any witness bleeding from GI tract however hemoccult gastric contents negative and hgb has been stable pt has had no bm in 2-3 days to assess for hemoccult stool    History of pulmonary embolus (PE)  Assessment & Plan  Patient unable to provide any information regarding  According to records at [facility] patient's anticoagulated on Xarelto  Review of records reveal acute PE on CT chest 12/23/18  Xarelto held given concern for possible blood in vomit  Unclear if pt is even taking blood thinner at home as he does have hx of non compliance with meds      Hyperlipidemia  Assessment & Plan  Continue statin    GERD (gastroesophageal reflux disease)  Assessment & Plan  With hx of severe esophagitis  Continue pepcid   PPI added 40mg bid    Hypertension  Assessment & Plan  Occasion regimen was verified with patient's pharmacy-[facility]  He is supposed to be taking losartan 25 mg daily, amlodipine 10 mg daily  Cozaar was restarted however prior to reinstatement pt was found to have orthostasis  Hold cozaar for now continue norvasc and monitor closely given orthostasis        VTE Pharmacologic Prophylaxis:   Pharmacologic: Pharmacologic VTE Prophylaxis contraindicated due to possible gib  Mechanical VTE Prophylaxis in Place: Yes    Patient Centered Rounds: I have performed bedside rounds with nursing staff today  Discussions with Specialists or Other Care Team Provider:     Education and Discussions with Family / Patient: dispo workup w/pt    Possible d/c tomorrow pending colonoscopy pending colonoscopy results and urine cx sensitivites    Time Spent for Care: 20 minutes  More than 50% of total time spent on counseling and coordination of care as described above  Current Length of Stay: 4 day(s)    Current Patient Status: Inpatient   Certification Statement: The patient will continue to require additional inpatient hospital stay due to pneumaturia    Discharge Plan:     Code Status: Level 1 - Full Code      Subjective:   Pt seen/examined  No events overnight  No cp/sob  Some lightheadedness getting to shower and standing up for orthostasis but no blurry vision or falls  Feels run down with nausea but only spitting up phlegm w/o emesis or bm x2d  Passing gas  Some suprapubic discomfort but no further pneumaturia    Objective:     Vitals:   Temp (24hrs), Av 7 °F (36 5 °C), Min:97 2 °F (36 2 °C), Max:98 °F (36 7 °C)    Temp:  [97 2 °F (36 2 °C)-98 °F (36 7 °C)] 97 2 °F (36 2 °C)  HR:  [79-91] 89  Resp:  [18] 18  BP: (140-156)/(74-86) 142/74  SpO2:  [99 %-100 %] 99 %  Body mass index is 17 65 kg/m²  Input and Output Summary (last 24 hours): Intake/Output Summary (Last 24 hours) at 2020 1321  Last data filed at 2020 1700  Gross per 24 hour   Intake --   Output 250 ml   Net -250 ml       Physical Exam:     Physical Exam   Constitutional: He appears well-developed  No distress  HENT:   Head: Normocephalic and atraumatic  Right Ear: External ear normal    Left Ear: External ear normal    Eyes: Conjunctivae are normal    Neck: Normal range of motion  Cardiovascular: Normal rate, regular rhythm and normal heart sounds  Exam reveals no gallop and no friction rub  No murmur heard  Pulmonary/Chest: Effort normal  No respiratory distress  He has no wheezes  He has no rales  Diminished breath sounds diffusely  No bettye r/w/r   Abdominal: Soft  He exhibits no distension and no mass  There is tenderness (mild TTP of upper abdomen b/l)  There is no guarding  Musculoskeletal: He exhibits no edema  Neurological: He is alert  Skin: Skin is warm and dry  He is not diaphoretic  Psychiatric: He has a normal mood and affect  Vitals reviewed  (  Be Sure to Include Physical Exam: Delete this entire line when you have entered your exam)    Additional Data:     Labs:    Results from last 7 days   Lab Units 01/30/20  0453   WBC Thousand/uL 6 03   HEMOGLOBIN g/dL 9 8*   HEMATOCRIT % 29 5*   PLATELETS Thousands/uL 196   NEUTROS PCT % 69   LYMPHS PCT % 20   MONOS PCT % 10   EOS PCT % 1     Results from last 7 days   Lab Units 01/30/20  0453  01/28/20  0440   SODIUM mmol/L 142   < > 142   POTASSIUM mmol/L 4 0   < > 3 2*   CHLORIDE mmol/L 104   < > 96*   CO2 mmol/L 30   < > 29   BUN mg/dL 12   < > 41*   CREATININE mg/dL 0 88   < > 2 87*   ANION GAP mmol/L 8   < > 17*   CALCIUM mg/dL 8 0*   < > 8 7   ALBUMIN g/dL  --   --  3 7   TOTAL BILIRUBIN mg/dL  --   --  0 65   ALK PHOS U/L  --   --  93   ALT U/L  --   --  65   AST U/L  --   --  44   GLUCOSE RANDOM mg/dL 123   < > 147*    < > = values in this interval not displayed  Results from last 7 days   Lab Units 01/27/20  2300   INR  0 97     Results from last 7 days   Lab Units 01/31/20  1122 01/31/20  0653 01/30/20  2042 01/30/20  1622 01/30/20  1102 01/30/20  0723 01/29/20  2248 01/29/20  1637 01/29/20  1211 01/29/20  1110 01/29/20  0711 01/28/20  2115   POC GLUCOSE mg/dl 96 68 64* 115 265* 146* 282* 118 285* 309* 142* 269*     Results from last 7 days   Lab Units 01/29/20  0616   HEMOGLOBIN A1C % 9 7*               * I Have Reviewed All Lab Data Listed Above  * Additional Pertinent Lab Tests Reviewed:  All Labs Within Last 24 Hours Reviewed    Imaging:    Imaging Reports Reviewed Today Include:   Imaging Personally Reviewed by Myself Includes:      Recent Cultures (last 7 days):     Results from last 7 days   Lab Units 01/29/20  1636 01/28/20  1418   URINE CULTURE  >100,000 cfu/ml Escherichia coli*  --    C DIFF TOXIN B   --  Negative       Last 24 Hours Medication List:     Current Facility-Administered Medications:  acetaminophen 650 mg Oral Q6H PRN Vivi Adamson PA-C    amLODIPine 10 mg Oral Daily Vivi Adamson PA-C    atorvastatin 40 mg Oral Daily With SensinodeDANETTE    cefTRIAXone 1,000 mg Intravenous Q24H Patricia Webster PA-C Last Rate: 1,000 mg (01/31/20 1254)   citalopram 40 mg Oral Daily Vivi Adamson PA-C    famotidine 20 mg Oral Daily Vivi Adamson PA-C    hydrALAZINE 10 mg Intravenous Q6H PRN Annabelle Méndez PA-C    insulin glargine 14 Units Subcutaneous QAM Annabelle Méndez PA-C    insulin lispro 1-5 Units Subcutaneous TID AC Vivi Adamson PA-C    insulin lispro 1-5 Units Subcutaneous HS Vivi Adamson PA-C    ondansetron 4 mg Intravenous Q6H PRN Vivi Adamson PA-C    pantoprazole 40 mg Oral BID AC Vivi Adamson PA-C    sodium chloride 500 mL Intravenous Once Patricia Webster PA-C         Today, Patient Was Seen By: Patricia Webster PA-C    ** Please Note: Dictation voice to text software may have been used in the creation of this document   **

## 2020-01-31 NOTE — ASSESSMENT & PLAN NOTE
Admitting hemoglobin of 11 9    Hemoglobin 9 8 and appears stable from 9 6-10 4 over the last 2-3 days without any witness bleeding from GI tract however hemoccult gastric contents negative and hgb has been stable pt has had no bm in 2-3 days to assess for hemoccult stool

## 2020-01-31 NOTE — PLAN OF CARE
Problem: PHYSICAL THERAPY ADULT  Goal: Performs mobility at highest level of function for planned discharge setting  See evaluation for individualized goals  Description  Treatment/Interventions: Functional transfer training, LE strengthening/ROM, Therapeutic exercise, Endurance training, Patient/family training, Bed mobility, Gait training, Spoke to nursing, OT  Equipment Recommended: Walker(RW)       See flowsheet documentation for full assessment, interventions and recommendations  Outcome: Progressing  Note:   Prognosis: Good  Problem List: Decreased endurance, Impaired balance, Decreased mobility, Impaired vision, Impaired sensation  Assessment: Pt transfers with mod I and ambulates with use of rw for distances of 225' with supervision assist with slow, fluent reciprocal gait pattern and forward flexed posture  Upon entering Pt observed ambualting in room  without use of an AD for short distances  Pt demonstrates no gross lob with or without use of and AD  Recommend use of Rw for longer distances for endurance, balance and safety  Recommend d/c to home with family support and HHPT  Barriers to Discharge: Decreased caregiver support  Barriers to Discharge Comments: Lives alone  Recommendation: Home PT, Home with family support     PT - OK to Discharge: Yes(to home)    See flowsheet documentation for full assessment

## 2020-01-31 NOTE — ASSESSMENT & PLAN NOTE
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

## 2020-01-31 NOTE — ANESTHESIA PREPROCEDURE EVALUATION
Review of Systems/Medical History          Cardiovascular  Hyperlipidemia, Hypertension ,    Pulmonary  Negative pulmonary ROS        GI/Hepatic    GERD ,        Kidney disease ARF,   Comment: Pneumaturia, fistula suspected     Endo/Other  Diabetes poorly controlled ,      GYN       Hematology   Musculoskeletal  Negative musculoskeletal ROS        Neurology   Psychology   Negative psychology ROS              Physical Exam    Airway    Mallampati score: I  TM Distance: >3 FB  Neck ROM: full     Dental   Comment: Poor dentition,     Cardiovascular  Rhythm: regular, Rate: normal,     Pulmonary  Pulmonary exam normal     Other Findings        Anesthesia Plan  ASA Score- 3     Anesthesia Type- IV sedation with anesthesia with ASA Monitors  Additional Monitors:   Airway Plan:         Plan Factors-    Induction- intravenous  Postoperative Plan-     Informed Consent- Anesthetic plan and risks discussed with patient

## 2020-01-31 NOTE — PROGRESS NOTES
Patient Name: Leighton Singh  Patient MRN: [id number]  Date: 01/31/20  Service: [facility]    Subjective   Leighton Singh is a 62 y o  male who was admitted with Pneumaturia  He stable today  Patient denies: Chest pain, shortness of breath, fever, weight loss, rash, adenopathy  All others negative except as noted in HPI  Objective     Vitals  Blood pressure 142/74, pulse 89, temperature (!) 97 2 °F (36 2 °C), temperature source Tympanic, resp  rate 18, height 5' 6" (1 676 m), weight 49 6 kg (109 lb 5 6 oz), SpO2 99 %  ROS:  Patient denies: Chest pain, shortness of breath, fever, weight loss, rash, adenopathy  All others negative except as noted in HPI  General: Alert, no apparent distress  Eyes: No scleral icterus  Abdomen: Soft  Nontender  Nondistended  Bowel sounds present and normoactive  No hepatosplenomegaly    Skin: No jaundice  Neuro: Alert and oriented x3    Laboratory Studies  Lab Results   Component Value Date    CREATININE 0 88 01/30/2020    BUN 12 01/30/2020    SODIUM 142 01/30/2020    K 4 0 01/30/2020     01/30/2020    CO2 30 01/30/2020    CALCIUM 8 0 (L) 01/30/2020    ALKPHOS 93 01/28/2020    AST 44 01/28/2020    ALT 65 01/28/2020     Lab Results   Component Value Date    WBC 6 03 01/30/2020    HGB 9 8 (L) 01/30/2020    HCT 29 5 (L) 01/30/2020     01/30/2020    MCV 98 01/30/2020     Lab Results   Component Value Date    PROTIME 13 0 01/27/2020    INR 0 97 01/27/2020       Inhouse Medications       Current Facility-Administered Medications:     acetaminophen (TYLENOL) tablet 650 mg, 650 mg, Oral, Q6H PRN    amLODIPine (NORVASC) tablet 10 mg, 10 mg, Oral, Daily, 10 mg at 01/31/20 0926    atorvastatin (LIPITOR) tablet 40 mg, 40 mg, Oral, Daily With Dinner, 40 mg at 01/30/20 1822    citalopram (CeleXA) tablet 40 mg, 40 mg, Oral, Daily, 40 mg at 01/31/20 0926    famotidine (PEPCID) tablet 20 mg, 20 mg, Oral, Daily, 20 mg at 01/31/20 0961    hydrALAZINE (APRESOLINE) injection 10 mg, 10 mg, Intravenous, Q6H PRN    insulin glargine (LANTUS) subcutaneous injection 14 Units 0 14 mL, 14 Units, Subcutaneous, QAM    insulin lispro (HumaLOG) 100 units/mL subcutaneous injection 1-5 Units, 1-5 Units, Subcutaneous, TID AC, 2 Units at 01/30/20 1121 **AND** Fingerstick Glucose (POCT), , , TID AC    insulin lispro (HumaLOG) 100 units/mL subcutaneous injection 1-5 Units, 1-5 Units, Subcutaneous, HS, 2 Units at 01/29/20 2258    ondansetron (ZOFRAN) injection 4 mg, 4 mg, Intravenous, Q6H PRN, 4 mg at 01/30/20 1520    pantoprazole (PROTONIX) EC tablet 40 mg, 40 mg, Oral, BID AC, 40 mg at 01/31/20 0501      Assessment/Plan:    Appreciate colorectal help     No evidence urologically of a fistula but for colonoscopy to follow      Darius Alves MD

## 2020-01-31 NOTE — ASSESSMENT & PLAN NOTE
Patient unable to provide any information regarding  According to records at [facility] patient's anticoagulated on Xarelto  Review of records reveal acute PE on CT chest 12/23/18  Xarelto held given concern for possible blood in vomit   Unclear if pt is even taking blood thinner at home as he does have hx of non compliance with meds

## 2020-01-31 NOTE — PHYSICAL THERAPY NOTE
Physical Therapy Treatment Note       01/31/20 1253   Pain Assessment   Pain Assessment No/denies pain   Pain Score No Pain   Restrictions/Precautions   Other Precautions Fall Risk   General   Chart Reviewed Yes   Family/Caregiver Present No   Cognition   Overall Cognitive Status WFL   Arousal/Participation Alert   Attention Attends with cues to redirect   Orientation Level Oriented X4   Memory Decreased recall of precautions   Following Commands Follows one step commands without difficulty   Subjective   Subjective " I don't go out of the house much "     Transfers   Sit to Stand 6  Modified independent   Stand to Sit 6  Modified independent   Ambulation/Elevation   Gait pattern Forward Flexion; Excessively slow  (slow reciprocal gait pattern  )   Gait Assistance 5  Supervision   Additional items Assist x 1;Verbal cues   Assistive Device Rolling walker   Distance 225' x1   Balance   Static Sitting Normal   Dynamic Sitting Good   Static Standing Fair +   Dynamic Standing Fair -   Ambulatory Fair +   Activity Tolerance   Activity Tolerance Patient tolerated treatment well   Medical Staff Made Aware RN, Marianela   Nurse Made Aware yes   Assessment   Prognosis Good   Problem List Decreased endurance; Impaired balance;Decreased mobility; Impaired vision; Impaired sensation   Assessment Pt transfers with mod I and ambulates with use of rw for distances of 225' with supervision assist with slow, fluent reciprocal gait pattern and forward flexed posture  Upon entering Pt observed ambualting in room  without use of an AD for short distances  Pt demonstrates no gross lob with or without use of and AD  Recommend use of Rw for longer distances for endurance, balance and safety  Recommend d/c to home with family support and HHPT  Goals   Patient Goals " to get better and go home "     STG Expiration Date 02/11/20   PT Treatment Day 3   Plan   Treatment/Interventions Functional transfer training; Therapeutic exercise; Endurance training;Patient/family training;Equipment eval/education;Gait training;Spoke to nursing   Progress Improving as expected   PT Frequency   (3-5x/week)   Recommendation   Recommendation Home PT; Home with family support   PT - OK to Discharge Yes  (to home)       Raffi Hinds, PTA

## 2020-01-31 NOTE — ASSESSMENT & PLAN NOTE
Occasion regimen was verified with patient's pharmacy-[facility]  He is supposed to be taking losartan 25 mg daily, amlodipine 10 mg daily    Cozaar was restarted however prior to reinstatement pt was found to have orthostasis  Hold cozaar for now continue norvasc and monitor closely given orthostasis

## 2020-01-31 NOTE — ASSESSMENT & PLAN NOTE
Presyncope w/blurry vision with standing  This is persistent and likely due to mild orthostasis  -likely secondary to orthostasis given 30 mmhg drop of systolic BP overnight  Will give additional 500cc bolus of NS over 5 hours  Continue norvasc 10mg po dailyfor supine hypertension continue to hold cozaar   Defer midodrine given mild bp drop and encourage supportive care

## 2020-01-31 NOTE — ANESTHESIA POSTPROCEDURE EVALUATION
Post-Op Assessment Note    CV Status:  Stable    Pain management: adequate     Mental Status:  Alert and awake   Hydration Status:  Euvolemic   PONV Controlled:  Controlled   Airway Patency:  Patent   Post Op Vitals Reviewed: Yes      Staff: Anesthesiologist           /57 (01/31/20 1628)    Temp      Pulse 70 (01/31/20 1628)   Resp 18 (01/31/20 1628)    SpO2 100 % (01/31/20 1628)

## 2020-01-31 NOTE — ASSESSMENT & PLAN NOTE
N / V / D x 1 month  Difficult time tolerating oral diet and will vomit frequently however has not had any witnessed vomiting/diarrhea per staffing  Vomiting is dark brown/red in color  Diarrhea is purely liquid and brown per pt  CT abdomen with fluid-filled stomach and proximal small bowel with mild gastric and proximal small bowel wall thickening in keeping with nonspecific gastritis/enteritis  celiac panel negative  c diff enteric pathogen panels negative  Patient reports 'he vomited' while I spoke with daughter disposition on phone however no vomiting was heard by myself while in patient room  This is improved some nausea but no true vomiting today and no bm in 2 days  Continue PPI b i d    Pending colonoscopy results possible d/c today vs tomorrow w/home vna

## 2020-02-01 VITALS
HEIGHT: 66 IN | TEMPERATURE: 98.5 F | BODY MASS INDEX: 17.57 KG/M2 | OXYGEN SATURATION: 99 % | RESPIRATION RATE: 18 BRPM | DIASTOLIC BLOOD PRESSURE: 89 MMHG | SYSTOLIC BLOOD PRESSURE: 160 MMHG | HEART RATE: 93 BPM | WEIGHT: 109.35 LBS

## 2020-02-01 LAB
ANION GAP SERPL CALCULATED.3IONS-SCNC: 6 MMOL/L (ref 4–13)
BUN SERPL-MCNC: 9 MG/DL (ref 5–25)
CALCIUM SERPL-MCNC: 8 MG/DL (ref 8.3–10.1)
CHLORIDE SERPL-SCNC: 102 MMOL/L (ref 100–108)
CO2 SERPL-SCNC: 30 MMOL/L (ref 21–32)
CREAT SERPL-MCNC: 0.86 MG/DL (ref 0.6–1.3)
ERYTHROCYTE [DISTWIDTH] IN BLOOD BY AUTOMATED COUNT: 13.3 % (ref 11.6–15.1)
GFR SERPL CREATININE-BSD FRML MDRD: 112 ML/MIN/1.73SQ M
GLUCOSE SERPL-MCNC: 135 MG/DL (ref 65–140)
GLUCOSE SERPL-MCNC: 175 MG/DL (ref 65–140)
GLUCOSE SERPL-MCNC: 184 MG/DL (ref 65–140)
GLUCOSE SERPL-MCNC: 188 MG/DL (ref 65–140)
HCT VFR BLD AUTO: 27.6 % (ref 36.5–49.3)
HGB BLD-MCNC: 9.1 G/DL (ref 12–17)
MCH RBC QN AUTO: 32 PG (ref 26.8–34.3)
MCHC RBC AUTO-ENTMCNC: 33 G/DL (ref 31.4–37.4)
MCV RBC AUTO: 97 FL (ref 82–98)
PLATELET # BLD AUTO: 176 THOUSANDS/UL (ref 149–390)
PMV BLD AUTO: 11.4 FL (ref 8.9–12.7)
POTASSIUM SERPL-SCNC: 3.5 MMOL/L (ref 3.5–5.3)
RBC # BLD AUTO: 2.84 MILLION/UL (ref 3.88–5.62)
SODIUM SERPL-SCNC: 138 MMOL/L (ref 136–145)
WBC # BLD AUTO: 7.07 THOUSAND/UL (ref 4.31–10.16)

## 2020-02-01 PROCEDURE — 82948 REAGENT STRIP/BLOOD GLUCOSE: CPT

## 2020-02-01 PROCEDURE — 99239 HOSP IP/OBS DSCHRG MGMT >30: CPT | Performed by: PHYSICIAN ASSISTANT

## 2020-02-01 PROCEDURE — 80048 BASIC METABOLIC PNL TOTAL CA: CPT | Performed by: PHYSICIAN ASSISTANT

## 2020-02-01 PROCEDURE — 85027 COMPLETE CBC AUTOMATED: CPT | Performed by: PHYSICIAN ASSISTANT

## 2020-02-01 RX ORDER — BACLOFEN 10 MG/1
10 TABLET ORAL 3 TIMES DAILY
Qty: 60 TABLET | Refills: 0 | Status: CANCELLED | OUTPATIENT
Start: 2020-02-01

## 2020-02-01 RX ORDER — CEFDINIR 300 MG/1
300 CAPSULE ORAL EVERY 12 HOURS SCHEDULED
Qty: 6 CAPSULE | Refills: 0 | Status: SHIPPED | OUTPATIENT
Start: 2020-02-02 | End: 2020-02-05

## 2020-02-01 RX ORDER — FERROUS SULFATE TAB EC 324 MG (65 MG FE EQUIVALENT) 324 (65 FE) MG
324 TABLET DELAYED RESPONSE ORAL
Qty: 30 TABLET | Refills: 0 | Status: SHIPPED | OUTPATIENT
Start: 2020-02-01

## 2020-02-01 RX ORDER — PANTOPRAZOLE SODIUM 40 MG/1
40 TABLET, DELAYED RELEASE ORAL
Qty: 60 TABLET | Refills: 0 | Status: SHIPPED | OUTPATIENT
Start: 2020-02-01 | End: 2020-07-05 | Stop reason: HOSPADM

## 2020-02-01 RX ADMIN — FAMOTIDINE 20 MG: 20 TABLET, FILM COATED ORAL at 08:36

## 2020-02-01 RX ADMIN — INSULIN GLARGINE 14 UNITS: 100 INJECTION, SOLUTION SUBCUTANEOUS at 08:42

## 2020-02-01 RX ADMIN — INSULIN LISPRO 1 UNITS: 100 INJECTION, SOLUTION INTRAVENOUS; SUBCUTANEOUS at 08:43

## 2020-02-01 RX ADMIN — CITALOPRAM HYDROBROMIDE 40 MG: 20 TABLET ORAL at 08:36

## 2020-02-01 RX ADMIN — AMLODIPINE BESYLATE 10 MG: 10 TABLET ORAL at 08:36

## 2020-02-01 RX ADMIN — BACLOFEN 10 MG: 10 TABLET ORAL at 08:37

## 2020-02-01 RX ADMIN — PANTOPRAZOLE SODIUM 40 MG: 40 TABLET, DELAYED RELEASE ORAL at 05:55

## 2020-02-01 RX ADMIN — CEFTRIAXONE SODIUM 1000 MG: 10 INJECTION, POWDER, FOR SOLUTION INTRAVENOUS at 12:06

## 2020-02-01 NOTE — ASSESSMENT & PLAN NOTE
N / V / D x 1 month  Difficult time tolerating oral diet and will vomit frequently however has not had any witnessed vomiting/diarrhea per staffing  This has resolved  CT abdomen with fluid-filled stomach and proximal small bowel with mild gastric and proximal small bowel wall thickening in keeping with nonspecific gastritis/enteritis  celiac panel negative  c diff enteric pathogen panels negative  Patient reports 'he vomited' while I spoke with daughter disposition on phone however no vomiting was heard by myself while in patient room  This is improved some nausea but no true vomiting today and no bm in 2 days  Continue PPI b i d  Colonoscopy negative for ulcerations/erythema save sessile polyp x1  No fistula  Celiac negative    Op f/u with GI for further evaluation

## 2020-02-01 NOTE — DISCHARGE SUMMARY
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  Coli w/cephalosporin sensitivites  Resolved  -CT abdomen pelvis with mild circumferential bladder wall thickening possibly indicating cystitis  Moderate quantity of air within the bladder   -Ct fistulogram unremarkable  -s/p perez catheter insertion and per discussion with Urology PAJessC Unk Lines can attempt voiding trial at this time  -f/u ucx for 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 cystitis off abx   -repeat colonoscopy unremarkable for IBD or fistula    Dizziness  Assessment & Plan  Presyncope w/blurry vision with standing  This is persistent and likely due to mild orthostasis which has resolved w/ivf and discontinuing cozaar  Defer midodrine given   -likely secondary to orthostasis given 30 mmhg drop of systolic BP overnight  Continue norvasc 10mg po daily for supine hypertension continue to hold cozaar    Nausea vomiting and diarrhea  Assessment & Plan  N / V / D x 1 month  Difficult time tolerating oral diet and will vomit frequently however has not had any witnessed vomiting/diarrhea per staffing  This has resolved  CT abdomen with fluid-filled stomach and proximal small bowel with mild gastric and proximal small bowel wall thickening in keeping with nonspecific gastritis/enteritis  celiac panel negative  c diff enteric pathogen panels negative  Patient reports 'he vomited' while I spoke with daughter disposition on phone however no vomiting was heard by myself while in patient room  This is improved some nausea but no true vomiting today and no bm in 2 days  Continue PPI b i d  Colonoscopy negative for ulcerations/erythema save sessile polyp x1  No fistula  Celiac negative    Op f/u with GI for further evaluation    Anemia  Assessment & Plan  Admitting hemoglobin of 11 9  Hemoglobin 9 8 and appears stable from 9 6-10 4 over the last 2-3 days without any witness bleeding from GI tract however hemoccult gastric contents negative and hgb has been stable pt has had no bm in 2-3 days to assess for hemoccult stool  Started on low dose FeSO4 325mg daily w/breakfast given low serum iron level      [place] (acute kidney injury) (HCC)-resolved as of 1/30/2020  Assessment & Plan  Presenting with a Cr of 3 20  Baseline appears to be around 0 5-0 7  Improved to 0 88 with IV fluid hydration  IV fluids discontinued continue to encourage oral intake    History of pulmonary embolus (PE)  Assessment & Plan  Patient unable to provide any information regarding  According to records at [facility] patient's anticoagulated on Xarelto  Review of records reveal acute PE on CT chest 12/23/18  Xarelto held given concern for possible blood in vomit  Unclear if pt is even taking blood thinner at home as he does have hx of non compliance with meds      Hyperlipidemia  Assessment & Plan  Continue statin    GERD (gastroesophageal reflux disease)  Assessment & Plan  With hx of severe esophagitis  Continue pepcid   PPI added 40mg bid    Diabetes mellitus [facility])  Assessment & Plan  Lab Results   Component Value Date    HGBA1C 9 7 (H) 01/29/2020     Recent Labs     01/31/20  2034 02/01/20  0559 02/01/20  0841 02/01/20  1116   POCGLU 339* 184* 188* 135     Poorly controlled with a1c 9 7%  Reports taking lantus 20 units in the morning  Started on lantus 10 iu qam with poor appetite will uptitrate to 14 iu qam with reintroduction of surgical soft diet    Hypertension  Assessment & Plan  Occasion regimen was verified with patient's pharmacy-[facility]  He is supposed to be taking losartan 25 mg daily, amlodipine 10 mg daily    Cozaar was restarted however prior to reinstatement pt was found to have orthostasis  Hold cozaar for now continue norvasc         Discharging Physician / Practitioner: Antonette Senior PA-C  PCP: Rowdy Maynard DO  Admission Date:   Admission Orders (From admission, onward)     Ordered        01/27/20 2039  Inpatient Admission (expected length of stay for this patient Order details is greater than two midnights)  Once                   Discharge Date: 02/01/20    Resolved Problems  Date Reviewed: 2/1/2020          Resolved    [place] (acute kidney injury) ([facility] [address] ) 1/30/2020     Resolved by  Antonette Senior PA-C          Consultations During Hospital Stay:  ·  GI  · Colorectal surgery  · urologh    Procedures Performed:   · Colonoscopy-significant for sessile polyp    Significant Findings / Test Results:   · CT cystogram  · No evidence of fistula    CT head w/o contrast   No acute pathology    CT A/P abd/pelvis    Fluid-filled stomach and proximal small bowel with mild gastric and proximal small bowel wall thickening in keeping with a nonspecific gastritis/enteritis  No bowel obstruction or bowel pneumatosis      Mild circumferential bladder wall thickening possibly indicating a cystitis  Moderate quantity of air within the bladder may be secondary to recent catheterization      The study was marked in EPIC for immediate notification          Incidental Findings:   ·      Test Results Pending at Discharge (will require follow up):   ·      Outpatient Tests Requested:  ·     Complications:  none    Reason for Admission: pneumaturia    Hospital Course:     Katie Carey is a 62 y o  male patient who originally presented to the hospital on 1/27/2020 past medical history of type 2 diabetes, hypertension, hyperlipidemia, history of alcoholism, peripheral neuropathy, PE anticoagulated on Xarelto, anemia, depression/anxiety, renal cell carcinoma found in 11/2019 that patient opted to undergo surveillance with repeat imaging in 1 year      Patient is a very poor historian and unable to provide details about his past medical history or current medical condition  He does not know the medications that he takes at home      Receives the majority of his care through [facility]  Past medical history was obtained from Care everywhere      He presents with a complaint of nausea, vomiting, diarrhea for the past month  Patient reports being unable to keep any food down  He has difficulty tolerating things because they come back up  Associated dry heaving  Has also been having frequent a liquid brown diarrhea  Reports frequency and urgency with diarrhea and has messed himself multiple times  He is unable to get the bathroom in time  Additionally reports passing air through his urethra  He reports some white foam and then a few drops of urine follow       Complains of lightheadedness and dizziness  Has had recent falls where he reports hitting his head  Most recent being 2 days ago and then yesterday      Recent colonoscopy performed at [facility] on 12/5/19 for malignancy screening  A sigmoid polyp was removed and there was a poor prep      Denies any chest pain, chest pressure, palpitations, shortness of breath  Lives at home alone  No assistive devices to ambulate  Denies smoking, alcohol consumption at present, or current drug use  Please see above list of diagnoses and related plan for additional information  Condition at Discharge: good     Discharge Day Visit / Exam:     Subjective:  Pt seen/examined  No events overnight  No cp/sob   abd pain stable in left abdomen  Tolerating po w/o n/v/f/c    Loose stool this am   No lightheadedness  Vitals: Blood Pressure: 160/89 (02/01/20 0700)  Pulse: 93 (02/01/20 0700)  Temperature: 98 5 °F (36 9 °C) (02/01/20 0700)  Temp Source: Tympanic (02/01/20 0700)  Respirations: 18 (02/01/20 0700)  Height: 5' 6" (167 6 cm) (01/27/20 2148)  Weight - Scale: 49 6 kg (109 lb 5 6 oz) (01/27/20 2148)  SpO2: 99 % (02/01/20 0700)  Exam:   Physical Exam   Constitutional: He appears well-developed  No distress  HENT:   Head: Normocephalic and atraumatic  Right Ear: External ear normal    Left Ear: External ear normal    Eyes: Conjunctivae are normal    Neck: Normal range of motion  Cardiovascular: Normal rate, regular rhythm and normal heart sounds  Exam reveals no gallop and no friction rub  No murmur heard  Pulmonary/Chest: Effort normal  No respiratory distress  He has no wheezes  He has no rales  Abdominal: Soft  He exhibits no distension and no mass  There is tenderness (mild TTP in left abdomen, w/o guarding)  There is no guarding  Musculoskeletal: He exhibits no edema  Neurological: He is alert  Skin: Skin is warm and dry  He is not diaphoretic  Psychiatric: He has a normal mood and affect  Vitals reviewed  (  Be Sure to Include Physical Exam: Delete this entire line when you have entered your exam)  Discussion with Family: daughter     Discharge instructions/Information to patient and family:   See after visit summary for information provided to patient and family  Provisions for Follow-Up Care:  See after visit summary for information related to follow-up care and any pertinent home health orders  Disposition:     Home w/home VNA    For Discharges to [facility] SNF:   · Not Applicable to this Patient - Not Applicable to this Patient    Planned Readmission:       Discharge Statement:  I spent 45 minutes discharging the patient  This time was spent on the day of discharge  I had direct contact with the patient on the day of discharge  Greater than 50% of the total time was spent examining patient, answering all patient questions, arranging and discussing plan of care with patient as well as directly providing post-discharge instructions  Additional time then spent on discharge activities  Discharge Medications:  See after visit summary for reconciled discharge medications provided to patient and family        ** Please Note: This note has been constructed using a voice recognition system **

## 2020-02-01 NOTE — DISCHARGE INSTRUCTIONS
You were evaluated for air in the urine after a colonoscopy  This was evaluated by CT (cat scans) to look for a fistula or tunnel between the bladder and rectum  Thankfully there was no evidence of this on CT imaging or by repeat colonoscopy  You were found to have bacteria in your urine called E  Coli  It is important that you treat this with antibiotics for total of 3 more days  Please take this antibiotic with food  You were evaluated for diarrhea and vomiting  There was no evidence of blood in the vomiting on microscopic evaluation  Your colonoscopy did not show any evidence of inflammatory bowel disease but several biopsies were taken  There was no evidence of celiac disease either  This likely due to a viral illness most recently, but given these symptoms being somewhat chronic, we recommend you continue to follow with the gastroenterology doctors to continue with your care  You were evaluated for dizziness and lightheadedness  This was found to be due to orthostasis or blood pressure that takes too long to correct upon standing or sitting  This improved w/gentle IV fluids and decreasing your blood pressure medications  At this point your losartan/cozaar has been stopped but you should consider taking your amlodipine/norvasc as scheduled  Please take 2 minutes upon sitting up when getting out of bed before standing and then another 2 minutes after standing before walking to avoid further falls, as your blood pressure may take more time to catch up to you changing positions  Urinary Tract Infection in Men   WHAT YOU NEED TO KNOW:   A urinary tract infection (UTI) is caused by bacteria that get inside your urinary tract  Most bacteria that enter your urinary tract come out when you urinate  If the bacteria stay in your urinary tract, you may get an infection  Your urinary tract includes your kidneys, ureters, bladder, and urethra   Urine is made in your kidneys, and it flows from the ureters to the bladder  Urine leaves the bladder through the urethra  A UTI is more common in your lower urinary tract, which includes your bladder and urethra  DISCHARGE INSTRUCTIONS:   Seek care immediately if:   · You are urinating very little or not at all  · You have a high fever with shaking chills  · You have side or back pain that gets worse  Contact your healthcare provider if:   · You have a mild fever  · You do not feel better after 2 days of taking antibiotics  · You are vomiting  · You have new symptoms, such as blood or pus in your urine  · You have questions or concerns about your condition or care  Medicines:   · Antibiotics  help fight a bacterial infection  · Medicines  may be given to decrease pain and burning when you urinate  They will also help decrease the feeling that you need to urinate often  These medicines will make your urine orange or red  · Take your medicine as directed  Contact your healthcare provider if you think your medicine is not helping or if you have side effects  Tell him or her if you are allergic to any medicine  Keep a list of the medicines, vitamins, and herbs you take  Include the amounts, and when and why you take them  Bring the list or the pill bottles to follow-up visits  Carry your medicine list with you in case of an emergency  Prevent another UTI:   · Empty your bladder often  Urinate and empty your bladder as soon as you feel the need  Do not hold your urine for long periods of time  · Drink liquids as directed  Ask how much liquid to drink each day and which liquids are best for you  You may need to drink more liquids than usual to help flush out the bacteria  Do not drink alcohol, caffeine, or citrus juices  These can irritate your bladder and increase your symptoms  Your healthcare provider may recommend cranberry juice to help prevent a UTI  · Urinate after you have sex    This can help flush out bacteria passed during sex  · Do pelvic muscle exercises often  Pelvic muscle exercises may help you start and stop urinating  Strong pelvic muscles may help you empty your bladder easier  Squeeze these muscles tightly for 5 seconds like you are trying to hold back urine  Then relax for 5 seconds  Gradually work up to squeezing for 10 seconds  Do 3 sets of 15 repetitions a day, or as directed  Follow up with your healthcare provider as directed:  Write down your questions so you remember to ask them during your visits  © 2017 [employer] Information is for End User's use only and may not be sold, redistributed or otherwise used for commercial purposes  All illustrations and images included in CareNotes® are the copyrighted property of A D [employer] M [employer] Inc  or Carlos Quintero  The above information is an  only  It is not intended as medical advice for individual conditions or treatments  Talk to your doctor, nurse or pharmacist before following any medical regimen to see if it is safe and effective for you

## 2020-02-01 NOTE — PLAN OF CARE
Problem: Potential for Falls  Goal: Patient will remain free of falls  Description  INTERVENTIONS:  - Assess patient frequently for physical needs  -  Identify cognitive and physical deficits and behaviors that affect risk of falls  -  [place] fall precautions as indicated by assessment   - Educate patient/family on patient safety including physical limitations  - Instruct patient to call for assistance with activity based on assessment  - Modify environment to reduce risk of injury  - Consider OT/PT consult to assist with strengthening/mobility  Outcome: Progressing     Problem: Nutrition/Hydration-ADULT  Goal: Nutrient/Hydration intake appropriate for improving, restoring or maintaining nutritional needs  Description  Monitor and assess patient's nutrition/hydration status for malnutrition  Collaborate with interdisciplinary team and initiate plan and interventions as ordered  Monitor patient's weight and dietary intake as ordered or per policy  Utilize nutrition screening tool and intervene as necessary  Determine patient's food preferences and provide high-protein, high-caloric foods as appropriate       INTERVENTIONS:  - Monitor oral intake, urinary output, labs, and treatment plans  - Assess nutrition and hydration status and recommend course of action  - Evaluate amount of meals eaten  - Assist patient with eating if necessary   - Allow adequate time for meals  - Recommend/ encourage appropriate diets, oral nutritional supplements, and vitamin/mineral supplements  - Order, calculate, and assess calorie counts as needed  - Recommend, monitor, and adjust tube feedings and TPN/PPN based on assessed needs  - Assess need for intravenous fluids  - Provide specific nutrition/hydration education as appropriate  - Include patient/family/caregiver in decisions related to nutrition  Outcome: Progressing     Problem: GASTROINTESTINAL - ADULT  Goal: Minimal or absence of nausea and/or vomiting  Description  INTERVENTIONS:  - Administer IV fluids if ordered to ensure adequate hydration  - Maintain NPO status until nausea and vomiting are resolved  - Nasogastric tube if ordered  - Administer ordered antiemetic medications as needed  - Provide nonpharmacologic comfort measures as appropriate  - Advance diet as tolerated, if ordered  - Consider nutrition services referral to assist patient with adequate nutrition and appropriate food choices  Outcome: Progressing  Goal: Maintains or returns to baseline bowel function  Description  INTERVENTIONS:  - Assess bowel function  - Encourage oral fluids to ensure adequate hydration  - Administer IV fluids if ordered to ensure adequate hydration  - Administer ordered medications as needed  - Encourage mobilization and activity  - Consider nutritional services referral to assist patient with adequate nutrition and appropriate food choices  Outcome: Progressing  Goal: Maintains adequate nutritional intake  Description  INTERVENTIONS:  - Monitor percentage of each meal consumed  - Identify factors contributing to decreased intake, treat as appropriate  - Assist with meals as needed  - Monitor I&O, weight, and lab values if indicated  - Obtain nutrition services referral as needed  Outcome: Progressing  Goal: Establish and maintain optimal ostomy function  Description  INTERVENTIONS:  - Assess bowel function  - Encourage oral fluids to ensure adequate hydration  - Administer IV fluids if ordered to ensure adequate hydration   - Administer ordered medications as needed  - Encourage mobilization and activity  - Nutrition services referral to assist patient with appropriate food choices  - Assess stoma site  - Consider wound care consult   Outcome: Progressing     Problem: METABOLIC, FLUID AND ELECTROLYTES - ADULT  Goal: Electrolytes maintained within normal limits  Description  INTERVENTIONS:  - Monitor labs and assess patient for signs and symptoms of electrolyte imbalances  - Administer electrolyte replacement as ordered  - Monitor response to electrolyte replacements, including repeat lab results as appropriate  - Instruct patient on fluid and nutrition as appropriate  Outcome: Progressing  Goal: Fluid balance maintained  Description  INTERVENTIONS:  - Monitor labs   - Monitor I/O and WT  - Instruct patient on fluid and nutrition as appropriate  - Assess for signs & symptoms of volume excess or deficit  Outcome: Progressing  Goal: Glucose maintained within target range  Description  INTERVENTIONS:  - Monitor Blood Glucose as ordered  - Assess for signs and symptoms of hyperglycemia and hypoglycemia  - Administer ordered medications to maintain glucose within target range  - Assess nutritional intake and initiate nutrition service referral as needed  Outcome: Progressing     Problem: MUSCULOSKELETAL - ADULT  Goal: Maintain or return mobility to safest level of function  Description  INTERVENTIONS:  - Assess patient's ability to carry out ADLs; assess patient's baseline for ADL function and identify physical deficits which impact ability to perform ADLs (bathing, care of mouth/teeth, toileting, grooming, dressing, etc )  - Assess/evaluate cause of self-care deficits   - Assess range of motion  - Assess patient's mobility  - Assess patient's need for assistive devices and provide as appropriate  - Encourage maximum independence but intervene and supervise when necessary  - Involve family in performance of ADLs  - Assess for home care needs following discharge   - Consider OT consult to assist with ADL evaluation and planning for discharge  - Provide patient education as appropriate  Outcome: Progressing  Goal: Maintain proper alignment of affected body part  Description  INTERVENTIONS:  - Support, maintain and protect limb and body alignment  - Provide patient/ family with appropriate education  Outcome: Progressing     Problem: Prexisting or High Potential for Compromised Skin Integrity  Goal: Skin integrity is maintained or improved  Description  INTERVENTIONS:  - Identify patients at risk for skin breakdown  - Assess and monitor skin integrity  - Assess and monitor nutrition and hydration status  - Monitor labs   - Assess for incontinence   - Turn and reposition patient  - Assist with mobility/ambulation  - Relieve pressure over bony prominences  - Avoid friction and shearing  - Provide appropriate hygiene as needed including keeping skin clean and dry  - Evaluate need for skin moisturizer/barrier cream  - Collaborate with interdisciplinary team   - Patient/family teaching  - Consider wound care consult   Outcome: Progressing     Problem: COPING  Goal: Pt/Family able to verbalize concerns and demonstrate effective coping strategies  Description  INTERVENTIONS:  - Assist patient/family to identify coping skills, available support systems and cultural and spiritual values  - Provide emotional support, including active listening and acknowledgement of concerns of patient and caregivers  - Reduce environmental stimuli, as able  - Provide patient education  - Assess for spiritual pain/suffering and initiate spiritual care, including notification of Pastoral Care or ever based community as needed  - Assess effectiveness of coping strategies  Outcome: Progressing  Goal: Will report anxiety at manageable levels  Description  INTERVENTIONS:  - Administer medication as ordered  - Teach and encourage coping skills  - Provide emotional support  - Assess patient/family for anxiety and ability to cope  Outcome: Progressing

## 2020-02-01 NOTE — ASSESSMENT & PLAN NOTE
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

## 2020-02-01 NOTE — ASSESSMENT & PLAN NOTE
Patient unable to provide any information regarding  According to records at [facility] patient's anticoagulated on Xarelto  Review of records reveal acute PE on CT chest 12/23/18  Xarelto held given concern for possible blood in vomit   Unclear if pt is even taking blood thinner at home as he does have hx of non compliance with meds

## 2020-02-01 NOTE — ASSESSMENT & PLAN NOTE
Occasion regimen was verified with patient's pharmacy-[facility]  He is supposed to be taking losartan 25 mg daily, amlodipine 10 mg daily    Cozaar was restarted however prior to reinstatement pt was found to have orthostasis  Hold cozaar for now continue norvasc

## 2020-02-01 NOTE — ASSESSMENT & PLAN NOTE
Lab Results   Component Value Date    HGBA1C 9 7 (H) 01/29/2020     Recent Labs     01/31/20 2034 02/01/20  0559 02/01/20  0841 02/01/20  1116   POCGLU 339* 184* 188* 135     Poorly controlled with a1c 9 7%  Reports taking lantus 20 units in the morning    Started on lantus 10 iu qam with poor appetite will uptitrate to 14 iu qam with reintroduction of surgical soft diet

## 2020-02-01 NOTE — ASSESSMENT & PLAN NOTE
Presyncope w/blurry vision with standing  This is persistent and likely due to mild orthostasis which has resolved w/ivf and discontinuing cozaar  Defer midodrine given   -likely secondary to orthostasis given 30 mmhg drop of systolic BP overnight    Continue norvasc 10mg po daily for supine hypertension continue to hold cozaar

## 2020-02-01 NOTE — NURSING NOTE
AVS reviewed with patient  No questions at this time  Pt discharged via wheelchair accompanied by significant other and PCA  Pt in stable condition

## 2020-02-04 NOTE — TELEPHONE ENCOUNTER
Tried to call pt,  but line is busy  LMOM on "other number" to call back to make appt  We need to check if he still has the perez catheter, does he have VNA that can remove it? If not, he needs a perez removal, voiding trial   He also needs a Cystoscopy with Dr Tito Blake

## 2020-02-05 NOTE — TELEPHONE ENCOUNTER
Spoke with dgjai at her telephone #  Pt's # is not working at this time  She states the catheter was removed in the hospital  Appt given for cystoscopy in 2 weeks in office

## 2020-02-11 ENCOUNTER — TRANSCRIBE ORDERS (OUTPATIENT)
Dept: ADMINISTRATIVE | Facility: HOSPITAL | Age: 55
End: 2020-02-11

## 2020-02-11 DIAGNOSIS — R10.13 ABDOMINAL PAIN, EPIGASTRIC: Primary | ICD-10-CM

## 2020-02-11 DIAGNOSIS — K30 MILD DIETARY INDIGESTION: ICD-10-CM

## 2020-02-19 NOTE — TELEPHONE ENCOUNTER
Patients daughter called to cancel CYSTO today at 8:30  This has been rescheduled for first available on 4/1   If this needs to be rescheduled sooner, patients daughter can be reached at [phone]

## 2020-03-13 ENCOUNTER — PROCEDURE VISIT (OUTPATIENT)
Dept: UROLOGY | Facility: MEDICAL CENTER | Age: 55
End: 2020-03-13
Payer: COMMERCIAL

## 2020-03-13 VITALS — HEIGHT: 67 IN | WEIGHT: 115 LBS | BODY MASS INDEX: 18.05 KG/M2

## 2020-03-13 DIAGNOSIS — R39.89 PNEUMATURIA: Primary | ICD-10-CM

## 2020-03-13 DIAGNOSIS — N28.89 RENAL MASS: ICD-10-CM

## 2020-03-13 DIAGNOSIS — R81 GLYCOSURIA: ICD-10-CM

## 2020-03-13 DIAGNOSIS — R33.9 URINARY RETENTION: ICD-10-CM

## 2020-03-13 DIAGNOSIS — Z12.5 ENCOUNTER FOR PROSTATE CANCER SCREENING: ICD-10-CM

## 2020-03-13 LAB
SL AMB  POCT GLUCOSE, UA: >=1000
SL AMB LEUKOCYTE ESTERASE,UA: ABNORMAL
SL AMB POCT BILIRUBIN,UA: ABNORMAL
SL AMB POCT BLOOD,UA: ABNORMAL
SL AMB POCT CLARITY,UA: ABNORMAL
SL AMB POCT COLOR,UA: YELLOW
SL AMB POCT KETONES,UA: ABNORMAL
SL AMB POCT NITRITE,UA: ABNORMAL
SL AMB POCT PH,UA: 5
SL AMB POCT SPECIFIC GRAVITY,UA: 1.01
SL AMB POCT URINE PROTEIN: 100
SL AMB POCT UROBILINOGEN: 0.2

## 2020-03-13 PROCEDURE — 81003 URINALYSIS AUTO W/O SCOPE: CPT | Performed by: UROLOGY

## 2020-03-13 PROCEDURE — 99214 OFFICE O/P EST MOD 30 MIN: CPT | Performed by: UROLOGY

## 2020-03-13 PROCEDURE — 52000 CYSTOURETHROSCOPY: CPT | Performed by: UROLOGY

## 2020-03-13 RX ORDER — CEPHALEXIN 500 MG/1
500 CAPSULE ORAL EVERY 6 HOURS SCHEDULED
Qty: 12 CAPSULE | Refills: 0 | Status: SHIPPED | OUTPATIENT
Start: 2020-03-13 | End: 2020-03-16

## 2020-03-13 RX ORDER — EMPAGLIFLOZIN 10 MG/1
10 TABLET, FILM COATED ORAL DAILY
COMMUNITY
Start: 2020-02-18

## 2020-03-13 NOTE — PROGRESS NOTES
[address] [facility]  [facility]  [address] [facility]  [place], [address] [place] [address]  [phone]  www  [facility]  org      NAME: Nargis Hernandez  AGE: 62 y o  SEX: male  : 1965   MRN: [id number]    DATE: 3/13/2020  TIME: 9:33 AM    Assessment and Plan:      History of urinary retention, no sign of obstruction- may have neurogenic or a sensate 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  We will check an ultrasound with a PVR to evaluate this as well as his left renal lesion which was described as 1 cm at [facility]   I will send him the results and we will go from there  I would otherwise like to see him in 3 months with a postvoid residual                Chief Complaint     Chief Complaint   Patient presents with    Cystoscopy    Pneumaturia       History of Present Illness   57-year-old man seen by me in consultation at the hospital for pneumaturia and urinary retention with acute kidney injury that resolved with hydration and bladder decompression  No hydronephrosis was seen  CT cystogram was negative for fistula  Today's urinalysis shows greater than 1000 glucose, moderate blood trace leukocyte esterase and negative nitrites  Here for cystoscopy  Left renal mass:  He has a 10 mm enhancing left renal lesion most consistent with renal cell carcinoma evaluated by MRI and CT scan  This was done at [facility]   He had a CT scan without contrast here at [facility] 2020 and there was no lesion described but I think mac an upper pole lesion upon my own personal review that I did today with the patient    Cystoscopy  Date/Time: 3/13/2020 9:35 AM  Performed by: Phoenix Agosto MD  Authorized by: Phoenix Agosto MD     Procedure details: cystoscopy    Additional Procedure Details: Cystoscopy Procedure Note        Pre-operative Diagnosis:  Urinary retention, history of pneumaturia    Post-operative Diagnosis:  Normal bladder, nonobstructing prostate    Procedure: Flexible cystoscopy    Surgeon: Duglas Gonzalez MD    Anesthesia: 1% Xylocaine per urethra    EBL: Minimal    Complications: none    Procedure Details   The risks, benefits, complications, treatment options, and expected outcomes were discussed with the patient  The patient concurred with the proposed plan, giving informed consent  Cystoscopy was performed today under local anesthesia, using sterile technique  The patient was placed in the supine position, prepped with Betadine, and draped in the usual sterile fashion  The flexible cystocope was used to inspect both the urethra and bladder    Findings:  Urethra: normal without stricture  Prostate nonobstructive  Bladder:  Smooth, not trabeculated and there were no stones tumors or other lesions  The orifices were orthotopic and intact  Specimens:  None                 Complications:  None           Disposition: To home            Condition:  Stable            The following portions of the patient's history were reviewed and updated as appropriate: allergies, current medications, past family history, past medical history, past social history, past surgical history and problem list   Past Medical History:   Diagnosis Date    Diabetes mellitus ([facility] )     GERD (gastroesophageal reflux disease)     Hyperlipidemia     Hypertension     Migraine     Psychiatric disorder      Past Surgical History:   Procedure Laterality Date    CHOLECYSTECTOMY      COLONOSCOPY      CT CYSTOGRAM  1/28/2020    ESOPHAGOGASTRODUODENOSCOPY N/A 9/7/2016    Procedure: ESOPHAGOGASTRODUODENOSCOPY (EGD); Surgeon: Sona Pryor MD;  Location: [facility];   Service:      shoulder  Review of Systems   Review of Systems    Active Problem List     Patient Active Problem List   Diagnosis    Hyponatremia    Acute renal failure ([facility] )    Hypertension    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 2 diabetes mellitus ([facility] )    Type 2 diabetes mellitus with hyperglycemia, without long-term current use of insulin (HCC)    Ambulatory dysfunction    Acute pancreatitis    Accelerated hypertension    UTI (urinary tract infection)    Other hyperlipidemia    Diabetes mellitus (HCC)    GERD (gastroesophageal reflux disease)    Hyperlipidemia    Nausea vomiting and diarrhea    Pneumaturia    History of pulmonary embolus (PE)    Anemia    Dizziness       Objective   Ht 5' 6 5" (1 689 m)   Wt 52 2 kg (115 lb)   BMI 18 28 kg/m²     Physical Exam   Constitutional: He is oriented to person, place, and time  He appears well-developed and well-nourished  HENT:   Head: Normocephalic and atraumatic  Eyes: EOM are normal    Neck: Normal range of motion  Pulmonary/Chest: Effort normal    Genitourinary: Penis normal    Musculoskeletal: Normal range of motion  Neurological: He is alert and oriented to person, place, and time  Skin: Skin is warm and dry  Psychiatric: He has a normal mood and affect   His behavior is normal  Judgment and thought content normal            Current Medications     Current Outpatient Medications:     amLODIPine (NORVASC) 10 mg tablet, Take 1 tablet (10 mg total) by mouth daily, Disp: 30 tablet, Rfl: 0    atorvastatin (LIPITOR) 40 mg tablet, Take 40 mg by mouth daily, Disp: , Rfl:     busPIRone (BUSPAR) 5 mg tablet, Take 5 mg by mouth 2 (two) times a day, Disp: , Rfl:     citalopram (CeleXA) 40 mg tablet, Take 40 mg by mouth daily, Disp: , Rfl:     famotidine (PEPCID) 20 mg tablet, Take 1 tablet (20 mg total) by mouth daily, Disp: 30 tablet, Rfl: 0    ferrous sulfate 324 (65 Fe) mg, Take 1 tablet (324 mg total) by mouth daily before breakfast, Disp: 30 tablet, Rfl: 0    gabapentin (NEURONTIN) 100 mg capsule, Take 1 capsule (100 mg total) by mouth 2 (two) times a day, Disp: 60 capsule, Rfl: 0    insulin glargine (BASAGLAR KWIKPEN) 100 units/mL injection pen, Inject 14 Units under the skin daily before breakfast, Disp: 5 pen, Rfl: 0    JARDIANCE 10 MG TABS, , Disp: , Rfl:     losartan (COZAAR) 50 mg tablet, Take 50 mg by mouth daily, Disp: , Rfl:     meloxicam (MOBIC) 15 mg tablet, Take 15 mg by mouth daily, Disp: , Rfl:     metFORMIN (GLUCOPHAGE) 1000 MG tablet, Take 1 tablet (1,000 mg total) by mouth daily with breakfast, Disp: 30 tablet, Rfl: 1    mirtazapine (REMERON) 15 mg tablet, Take 1 tablet (15 mg total) by mouth daily at bedtime, Disp: 30 tablet, Rfl: 0    pantoprazole (PROTONIX) 40 mg tablet, Take 1 tablet (40 mg total) by mouth 2 (two) times a day before meals, Disp: 60 tablet, Rfl: 0    rivaroxaban (XARELTO) 20 mg tablet, Take 20 mg by mouth, Disp: , Rfl:         Kane Concepcion MD

## 2020-03-13 NOTE — LETTER
2020     Ines Worley, [address] [address]    Patient: Leigh Cross   YOB: 1965   Date of Visit: 3/13/2020       Dear Dr Bryce Gutierrez:    Thank you for referring Leigh Cross to me for evaluation  Below are my notes for this consultation  If you have questions, please do not hesitate to call me  I look forward to following your patient along with you  Sincerely,        Naye Vázquez MD        CC: No Recipients  Naye Vázquez MD  3/13/2020  9:53 AM  Sign at close encounter  [address] [facility]  [address], [address] [place] [phone]  www  [facility]  org      NAME: Leigh Cross  AGE: 62 y o  SEX: male  : 1965   MRN: [id number]    DATE: 3/13/2020  TIME: 9:33 AM    Assessment and Plan:      History of urinary retention, no sign of obstruction- may have neurogenic or a sensate 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  We will check an ultrasound with a PVR to evaluate this as well as his left renal lesion which was described as 1 cm at [facility]   I will send him the results and we will go from there  I would otherwise like to see him in 3 months with a postvoid residual                Chief Complaint     Chief Complaint   Patient presents with    Cystoscopy    Pneumaturia       History of Present Illness   68-year-old man seen by me in consultation at the hospital for pneumaturia and urinary retention with acute kidney injury that resolved with hydration and bladder decompression  No hydronephrosis was seen  CT cystogram was negative for fistula  Today's urinalysis shows greater than 1000 glucose, moderate blood trace leukocyte esterase and negative nitrites  Here for cystoscopy  Left renal mass:  He has a 10 mm enhancing left renal lesion most consistent with renal cell carcinoma evaluated by MRI and CT scan    This was done at [facility]   He had a CT scan without contrast here at [facility] 1/27/2020 and there was no lesion described but I think mac an upper pole lesion upon my own personal review that I did today with the patient  Cystoscopy  Date/Time: 3/13/2020 9:35 AM  Performed by: Sukhdev Li MD  Authorized by: Sukhdev Li MD     Procedure details: cystoscopy    Additional Procedure Details: Cystoscopy Procedure Note        Pre-operative Diagnosis:  Urinary retention, history of pneumaturia    Post-operative Diagnosis:  Normal bladder, nonobstructing prostate    Procedure: Flexible cystoscopy    Surgeon: Darron Brown MD    Anesthesia: 1% Xylocaine per urethra    EBL: Minimal    Complications: none    Procedure Details   The risks, benefits, complications, treatment options, and expected outcomes were discussed with the patient  The patient concurred with the proposed plan, giving informed consent  Cystoscopy was performed today under local anesthesia, using sterile technique  The patient was placed in the supine position, prepped with Betadine, and draped in the usual sterile fashion  The flexible cystocope was used to inspect both the urethra and bladder    Findings:  Urethra: normal without stricture  Prostate nonobstructive  Bladder:  Smooth, not trabeculated and there were no stones tumors or other lesions  The orifices were orthotopic and intact             Specimens:  None                 Complications:  None           Disposition: To home            Condition:  Stable            The following portions of the patient's history were reviewed and updated as appropriate: allergies, current medications, past family history, past medical history, past social history, past surgical history and problem list   Past Medical History:   Diagnosis Date    Diabetes mellitus ([address] )     GERD (gastroesophageal reflux disease)     Hyperlipidemia     Hypertension     Migraine     Psychiatric disorder      Past Surgical History: Procedure Laterality Date    CHOLECYSTECTOMY      COLONOSCOPY      CT CYSTOGRAM  1/28/2020    ESOPHAGOGASTRODUODENOSCOPY N/A 9/7/2016    Procedure: ESOPHAGOGASTRODUODENOSCOPY (EGD); Surgeon: Gadiel Dee MD;  Location: [facility]; Service:      shoulder  Review of Systems   Review of Systems    Active Problem List     Patient Active Problem List   Diagnosis    Hyponatremia    Acute renal failure ([facility] )    Hypertension    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 2 diabetes mellitus ([place] [address] )    Type 2 diabetes mellitus with hyperglycemia, without long-term current use of insulin ([place] [address] )    Ambulatory dysfunction    Acute pancreatitis    Accelerated hypertension    UTI (urinary tract infection)    Other hyperlipidemia    Diabetes mellitus (HCC)    GERD (gastroesophageal reflux disease)    Hyperlipidemia    Nausea vomiting and diarrhea    Pneumaturia    History of pulmonary embolus (PE)    Anemia    Dizziness       Objective   Ht 5' 6 5" (1 689 m)   Wt 52 2 kg (115 lb)   BMI 18 28 kg/m²      Physical Exam   Constitutional: He is oriented to person, place, and time  He appears well-developed and well-nourished  HENT:   Head: Normocephalic and atraumatic  Eyes: EOM are normal    Neck: Normal range of motion  Pulmonary/Chest: Effort normal    Genitourinary: Penis normal    Musculoskeletal: Normal range of motion  Neurological: He is alert and oriented to person, place, and time  Skin: Skin is warm and dry  Psychiatric: He has a normal mood and affect   His behavior is normal  Judgment and thought content normal            Current Medications     Current Outpatient Medications:     amLODIPine (NORVASC) 10 mg tablet, Take 1 tablet (10 mg total) by mouth daily, Disp: 30 tablet, Rfl: 0    atorvastatin (LIPITOR) 40 mg tablet, Take 40 mg by mouth daily, Disp: , Rfl:     busPIRone (BUSPAR) 5 mg tablet, Take 5 mg by mouth 2 (two) times a day, Disp: , Rfl:     citalopram (CeleXA) 40 mg tablet, Take 40 mg by mouth daily, Disp: , Rfl:     famotidine (PEPCID) 20 mg tablet, Take 1 tablet (20 mg total) by mouth daily, Disp: 30 tablet, Rfl: 0    ferrous sulfate 324 (65 Fe) mg, Take 1 tablet (324 mg total) by mouth daily before breakfast, Disp: 30 tablet, Rfl: 0    gabapentin (NEURONTIN) 100 mg capsule, Take 1 capsule (100 mg total) by mouth 2 (two) times a day, Disp: 60 capsule, Rfl: 0    insulin glargine (BASAGLAR KWIKPEN) 100 units/mL injection pen, Inject 14 Units under the skin daily before breakfast, Disp: 5 pen, Rfl: 0    JARDIANCE 10 MG TABS, , Disp: , Rfl:     losartan (COZAAR) 50 mg tablet, Take 50 mg by mouth daily, Disp: , Rfl:     meloxicam (MOBIC) 15 mg tablet, Take 15 mg by mouth daily, Disp: , Rfl:     metFORMIN (GLUCOPHAGE) 1000 MG tablet, Take 1 tablet (1,000 mg total) by mouth daily with breakfast, Disp: 30 tablet, Rfl: 1    mirtazapine (REMERON) 15 mg tablet, Take 1 tablet (15 mg total) by mouth daily at bedtime, Disp: 30 tablet, Rfl: 0    pantoprazole (PROTONIX) 40 mg tablet, Take 1 tablet (40 mg total) by mouth 2 (two) times a day before meals, Disp: 60 tablet, Rfl: 0    rivaroxaban (XARELTO) 20 mg tablet, Take 20 mg by mouth, Disp: , Rfl:         Blair Cisneros MD

## 2020-04-02 ENCOUNTER — HOSPITAL ENCOUNTER (OUTPATIENT)
Dept: ULTRASOUND IMAGING | Facility: HOSPITAL | Age: 55
Discharge: HOME | End: 2020-04-02
Attending: UROLOGY
Payer: COMMERCIAL

## 2020-04-02 DIAGNOSIS — N28.89 RENAL MASS: ICD-10-CM

## 2020-04-02 DIAGNOSIS — R33.9 URINARY RETENTION: ICD-10-CM

## 2020-04-02 PROCEDURE — 51798 US URINE CAPACITY MEASURE: CPT

## 2020-04-06 ENCOUNTER — TELEPHONE (OUTPATIENT)
Dept: UROLOGY | Facility: MEDICAL CENTER | Age: 55
End: 2020-04-06

## 2020-04-24 ENCOUNTER — TELEPHONE (OUTPATIENT)
Dept: RADIOLOGY | Facility: HOSPITAL | Age: 55
End: 2020-04-24

## 2020-05-19 ENCOUNTER — HOSPITAL ENCOUNTER (OUTPATIENT)
Dept: RADIOLOGY | Facility: HOSPITAL | Age: 55
Discharge: HOME | End: 2020-05-19
Attending: INTERNAL MEDICINE
Payer: COMMERCIAL

## 2020-05-19 DIAGNOSIS — K30 MILD DIETARY INDIGESTION: ICD-10-CM

## 2020-05-19 DIAGNOSIS — R10.13 ABDOMINAL PAIN, EPIGASTRIC: ICD-10-CM

## 2020-05-19 PROCEDURE — 74250 X-RAY XM SM INT 1CNTRST STD: CPT

## 2020-07-03 ENCOUNTER — HOSPITAL ENCOUNTER (OUTPATIENT)
Facility: HOSPITAL | Age: 55
Setting detail: OBSERVATION
Discharge: HOME HEALTH | End: 2020-07-05
Attending: EMERGENCY MEDICINE | Admitting: INTERNAL MEDICINE
Payer: COMMERCIAL

## 2020-07-03 ENCOUNTER — APPOINTMENT (EMERGENCY)
Dept: CT IMAGING | Facility: HOSPITAL | Age: 55
End: 2020-07-03
Payer: COMMERCIAL

## 2020-07-03 DIAGNOSIS — K85.90 PANCREATITIS: ICD-10-CM

## 2020-07-03 DIAGNOSIS — R56.9 SEIZURE (HCC): ICD-10-CM

## 2020-07-03 DIAGNOSIS — R11.2 NAUSEA AND VOMITING: ICD-10-CM

## 2020-07-03 DIAGNOSIS — G93.40 ACUTE ENCEPHALOPATHY: ICD-10-CM

## 2020-07-03 DIAGNOSIS — R74.01 TRANSAMINITIS: ICD-10-CM

## 2020-07-03 DIAGNOSIS — R41.82 ALTERED MENTAL STATUS: Primary | ICD-10-CM

## 2020-07-03 LAB
ALBUMIN SERPL BCP-MCNC: 4.2 G/DL (ref 3.5–5)
ALP SERPL-CCNC: 129 U/L (ref 46–116)
ALT SERPL W P-5'-P-CCNC: 309 U/L (ref 12–78)
AMMONIA PLAS-SCNC: 11 UMOL/L (ref 11–35)
ANION GAP SERPL CALCULATED.3IONS-SCNC: 11 MMOL/L (ref 4–13)
APAP SERPL-MCNC: <2 UG/ML (ref 10–20)
AST SERPL W P-5'-P-CCNC: 188 U/L (ref 5–45)
BACTERIA UR QL AUTO: ABNORMAL /HPF
BASOPHILS # BLD AUTO: 0.07 THOUSANDS/ΜL (ref 0–0.1)
BASOPHILS NFR BLD AUTO: 1 % (ref 0–1)
BILIRUB SERPL-MCNC: 0.68 MG/DL (ref 0.2–1)
BILIRUB UR QL STRIP: NEGATIVE
BUN SERPL-MCNC: 23 MG/DL (ref 5–25)
CALCIUM SERPL-MCNC: 8.9 MG/DL (ref 8.3–10.1)
CHLORIDE SERPL-SCNC: 103 MMOL/L (ref 100–108)
CLARITY UR: CLEAR
CO2 SERPL-SCNC: 26 MMOL/L (ref 21–32)
COLOR UR: YELLOW
CREAT SERPL-MCNC: 1.31 MG/DL (ref 0.6–1.3)
EOSINOPHIL # BLD AUTO: 0.02 THOUSAND/ΜL (ref 0–0.61)
EOSINOPHIL NFR BLD AUTO: 0 % (ref 0–6)
ERYTHROCYTE [DISTWIDTH] IN BLOOD BY AUTOMATED COUNT: 14.5 % (ref 11.6–15.1)
ETHANOL SERPL-MCNC: 3 MG/DL (ref 0–3)
GFR SERPL CREATININE-BSD FRML MDRD: 70 ML/MIN/1.73SQ M
GLUCOSE SERPL-MCNC: 178 MG/DL (ref 65–140)
GLUCOSE SERPL-MCNC: 277 MG/DL (ref 65–140)
GLUCOSE SERPL-MCNC: 301 MG/DL (ref 65–140)
GLUCOSE SERPL-MCNC: 313 MG/DL (ref 65–140)
GLUCOSE UR STRIP-MCNC: ABNORMAL MG/DL
HCT VFR BLD AUTO: 34.4 % (ref 36.5–49.3)
HGB BLD-MCNC: 11.2 G/DL (ref 12–17)
HGB UR QL STRIP.AUTO: ABNORMAL
IMM GRANULOCYTES # BLD AUTO: 0.03 THOUSAND/UL (ref 0–0.2)
IMM GRANULOCYTES NFR BLD AUTO: 0 % (ref 0–2)
KETONES UR STRIP-MCNC: NEGATIVE MG/DL
LEUKOCYTE ESTERASE UR QL STRIP: ABNORMAL
LIPASE SERPL-CCNC: 994 U/L (ref 73–393)
LYMPHOCYTES # BLD AUTO: 0.51 THOUSANDS/ΜL (ref 0.6–4.47)
LYMPHOCYTES NFR BLD AUTO: 7 % (ref 14–44)
MCH RBC QN AUTO: 32.8 PG (ref 26.8–34.3)
MCHC RBC AUTO-ENTMCNC: 32.6 G/DL (ref 31.4–37.4)
MCV RBC AUTO: 101 FL (ref 82–98)
MONOCYTES # BLD AUTO: 0.48 THOUSAND/ΜL (ref 0.17–1.22)
MONOCYTES NFR BLD AUTO: 6 % (ref 4–12)
NEUTROPHILS # BLD AUTO: 6.71 THOUSANDS/ΜL (ref 1.85–7.62)
NEUTS SEG NFR BLD AUTO: 86 % (ref 43–75)
NITRITE UR QL STRIP: NEGATIVE
NON-SQ EPI CELLS URNS QL MICRO: ABNORMAL /HPF
NRBC BLD AUTO-RTO: 0 /100 WBCS
OTHER STN SPEC: ABNORMAL
PH UR STRIP.AUTO: 6 [PH]
PLATELET # BLD AUTO: 160 THOUSANDS/UL (ref 149–390)
PMV BLD AUTO: 11.6 FL (ref 8.9–12.7)
POTASSIUM SERPL-SCNC: 3.8 MMOL/L (ref 3.5–5.3)
PROT SERPL-MCNC: 8.2 G/DL (ref 6.4–8.2)
PROT UR STRIP-MCNC: ABNORMAL MG/DL
RBC # BLD AUTO: 3.41 MILLION/UL (ref 3.88–5.62)
RBC #/AREA URNS AUTO: ABNORMAL /HPF
SALICYLATES SERPL-MCNC: <3 MG/DL (ref 3–20)
SODIUM SERPL-SCNC: 140 MMOL/L (ref 136–145)
SP GR UR STRIP.AUTO: 1.01 (ref 1–1.03)
T4 FREE SERPL-MCNC: 1.32 NG/DL (ref 0.76–1.46)
TROPONIN I SERPL-MCNC: <0.02 NG/ML
TSH SERPL DL<=0.05 MIU/L-ACNC: 6.88 UIU/ML (ref 0.36–3.74)
UROBILINOGEN UR QL STRIP.AUTO: 0.2 E.U./DL
WBC # BLD AUTO: 7.82 THOUSAND/UL (ref 4.31–10.16)
WBC #/AREA URNS AUTO: ABNORMAL /HPF

## 2020-07-03 PROCEDURE — 84484 ASSAY OF TROPONIN QUANT: CPT | Performed by: EMERGENCY MEDICINE

## 2020-07-03 PROCEDURE — 82140 ASSAY OF AMMONIA: CPT | Performed by: EMERGENCY MEDICINE

## 2020-07-03 PROCEDURE — 80329 ANALGESICS NON-OPIOID 1 OR 2: CPT | Performed by: EMERGENCY MEDICINE

## 2020-07-03 PROCEDURE — 85025 COMPLETE CBC W/AUTO DIFF WBC: CPT | Performed by: EMERGENCY MEDICINE

## 2020-07-03 PROCEDURE — 99285 EMERGENCY DEPT VISIT HI MDM: CPT

## 2020-07-03 PROCEDURE — 96374 THER/PROPH/DIAG INJ IV PUSH: CPT

## 2020-07-03 PROCEDURE — 80053 COMPREHEN METABOLIC PANEL: CPT | Performed by: EMERGENCY MEDICINE

## 2020-07-03 PROCEDURE — 99220 PR INITIAL OBSERVATION CARE/DAY 70 MINUTES: CPT | Performed by: PHYSICIAN ASSISTANT

## 2020-07-03 PROCEDURE — 93005 ELECTROCARDIOGRAM TRACING: CPT

## 2020-07-03 PROCEDURE — 84443 ASSAY THYROID STIM HORMONE: CPT | Performed by: EMERGENCY MEDICINE

## 2020-07-03 PROCEDURE — 80320 DRUG SCREEN QUANTALCOHOLS: CPT | Performed by: EMERGENCY MEDICINE

## 2020-07-03 PROCEDURE — 82948 REAGENT STRIP/BLOOD GLUCOSE: CPT

## 2020-07-03 PROCEDURE — 36415 COLL VENOUS BLD VENIPUNCTURE: CPT | Performed by: EMERGENCY MEDICINE

## 2020-07-03 PROCEDURE — 70450 CT HEAD/BRAIN W/O DYE: CPT

## 2020-07-03 PROCEDURE — 99285 EMERGENCY DEPT VISIT HI MDM: CPT | Performed by: EMERGENCY MEDICINE

## 2020-07-03 PROCEDURE — 83690 ASSAY OF LIPASE: CPT | Performed by: EMERGENCY MEDICINE

## 2020-07-03 PROCEDURE — 81001 URINALYSIS AUTO W/SCOPE: CPT | Performed by: PHYSICIAN ASSISTANT

## 2020-07-03 PROCEDURE — 84439 ASSAY OF FREE THYROXINE: CPT | Performed by: EMERGENCY MEDICINE

## 2020-07-03 RX ORDER — ONDANSETRON 2 MG/ML
4 INJECTION INTRAMUSCULAR; INTRAVENOUS ONCE
Status: COMPLETED | OUTPATIENT
Start: 2020-07-03 | End: 2020-07-03

## 2020-07-03 RX ORDER — HEPARIN SODIUM 5000 [USP'U]/ML
5000 INJECTION, SOLUTION INTRAVENOUS; SUBCUTANEOUS EVERY 8 HOURS SCHEDULED
Status: DISCONTINUED | OUTPATIENT
Start: 2020-07-03 | End: 2020-07-03 | Stop reason: CLARIF

## 2020-07-03 RX ORDER — FERROUS SULFATE 325(65) MG
325 TABLET ORAL
Status: DISCONTINUED | OUTPATIENT
Start: 2020-07-04 | End: 2020-07-05 | Stop reason: HOSPADM

## 2020-07-03 RX ORDER — GABAPENTIN 100 MG/1
100 CAPSULE ORAL 2 TIMES DAILY
Status: DISCONTINUED | OUTPATIENT
Start: 2020-07-03 | End: 2020-07-05 | Stop reason: HOSPADM

## 2020-07-03 RX ORDER — ACETAMINOPHEN 325 MG/1
650 TABLET ORAL EVERY 6 HOURS PRN
Status: DISCONTINUED | OUTPATIENT
Start: 2020-07-03 | End: 2020-07-05 | Stop reason: HOSPADM

## 2020-07-03 RX ORDER — CITALOPRAM 20 MG/1
40 TABLET ORAL DAILY
Status: DISCONTINUED | OUTPATIENT
Start: 2020-07-04 | End: 2020-07-05 | Stop reason: HOSPADM

## 2020-07-03 RX ORDER — LORAZEPAM 2 MG/ML
1 INJECTION INTRAMUSCULAR ONCE
Status: COMPLETED | OUTPATIENT
Start: 2020-07-03 | End: 2020-07-03

## 2020-07-03 RX ORDER — PANTOPRAZOLE SODIUM 40 MG/1
40 TABLET, DELAYED RELEASE ORAL
Status: DISCONTINUED | OUTPATIENT
Start: 2020-07-03 | End: 2020-07-05 | Stop reason: HOSPADM

## 2020-07-03 RX ORDER — SODIUM CHLORIDE 9 MG/ML
75 INJECTION, SOLUTION INTRAVENOUS CONTINUOUS
Status: DISCONTINUED | OUTPATIENT
Start: 2020-07-03 | End: 2020-07-04

## 2020-07-03 RX ORDER — AMLODIPINE BESYLATE 10 MG/1
10 TABLET ORAL DAILY
Status: DISCONTINUED | OUTPATIENT
Start: 2020-07-03 | End: 2020-07-05 | Stop reason: HOSPADM

## 2020-07-03 RX ORDER — INSULIN GLARGINE 100 [IU]/ML
14 INJECTION, SOLUTION SUBCUTANEOUS EVERY MORNING
Status: DISCONTINUED | OUTPATIENT
Start: 2020-07-04 | End: 2020-07-04

## 2020-07-03 RX ORDER — ATORVASTATIN CALCIUM 40 MG/1
40 TABLET, FILM COATED ORAL
Status: DISCONTINUED | OUTPATIENT
Start: 2020-07-03 | End: 2020-07-04

## 2020-07-03 RX ORDER — MIRTAZAPINE 15 MG/1
15 TABLET, FILM COATED ORAL
Status: DISCONTINUED | OUTPATIENT
Start: 2020-07-03 | End: 2020-07-05 | Stop reason: HOSPADM

## 2020-07-03 RX ORDER — ONDANSETRON 2 MG/ML
4 INJECTION INTRAMUSCULAR; INTRAVENOUS EVERY 6 HOURS PRN
Status: DISCONTINUED | OUTPATIENT
Start: 2020-07-03 | End: 2020-07-05 | Stop reason: HOSPADM

## 2020-07-03 RX ORDER — LEVETIRACETAM 500 MG/1
500 TABLET ORAL EVERY 12 HOURS SCHEDULED
Status: DISCONTINUED | OUTPATIENT
Start: 2020-07-03 | End: 2020-07-04

## 2020-07-03 RX ORDER — SENNOSIDES 8.6 MG
1 TABLET ORAL
Status: DISCONTINUED | OUTPATIENT
Start: 2020-07-03 | End: 2020-07-05 | Stop reason: HOSPADM

## 2020-07-03 RX ORDER — CALCIUM CARBONATE 200(500)MG
1000 TABLET,CHEWABLE ORAL 3 TIMES DAILY PRN
Status: DISCONTINUED | OUTPATIENT
Start: 2020-07-03 | End: 2020-07-05 | Stop reason: HOSPADM

## 2020-07-03 RX ORDER — BUSPIRONE HYDROCHLORIDE 5 MG/1
5 TABLET ORAL 2 TIMES DAILY
Status: DISCONTINUED | OUTPATIENT
Start: 2020-07-03 | End: 2020-07-05 | Stop reason: HOSPADM

## 2020-07-03 RX ADMIN — LEVETIRACETAM 500 MG: 500 TABLET ORAL at 21:15

## 2020-07-03 RX ADMIN — INSULIN LISPRO 1 UNITS: 100 INJECTION, SOLUTION INTRAVENOUS; SUBCUTANEOUS at 15:56

## 2020-07-03 RX ADMIN — GABAPENTIN 100 MG: 100 CAPSULE ORAL at 17:25

## 2020-07-03 RX ADMIN — SODIUM CHLORIDE 75 ML/HR: 0.9 INJECTION, SOLUTION INTRAVENOUS at 15:11

## 2020-07-03 RX ADMIN — MIRTAZAPINE 15 MG: 15 TABLET, FILM COATED ORAL at 21:16

## 2020-07-03 RX ADMIN — SODIUM CHLORIDE 1000 ML: 0.9 INJECTION, SOLUTION INTRAVENOUS at 14:02

## 2020-07-03 RX ADMIN — ONDANSETRON 4 MG: 2 INJECTION INTRAMUSCULAR; INTRAVENOUS at 11:49

## 2020-07-03 RX ADMIN — AMLODIPINE BESYLATE 10 MG: 10 TABLET ORAL at 15:11

## 2020-07-03 RX ADMIN — ATORVASTATIN CALCIUM 40 MG: 40 TABLET, FILM COATED ORAL at 15:54

## 2020-07-03 RX ADMIN — BUSPIRONE HYDROCHLORIDE 5 MG: 5 TABLET ORAL at 17:25

## 2020-07-03 RX ADMIN — INSULIN LISPRO 3 UNITS: 100 INJECTION, SOLUTION INTRAVENOUS; SUBCUTANEOUS at 21:16

## 2020-07-03 RX ADMIN — PANTOPRAZOLE SODIUM 40 MG: 40 TABLET, DELAYED RELEASE ORAL at 15:54

## 2020-07-03 NOTE — PLAN OF CARE
Problem: Potential for Falls  Goal: Patient will remain free of falls  Description  INTERVENTIONS:  - Assess patient frequently for physical needs  -  Identify cognitive and physical deficits and behaviors that affect risk of falls    -  [place] fall precautions as indicated by assessment   - Educate patient/family on patient safety including physical limitations  - Instruct patient to call for assistance with activity based on assessment  - Modify environment to reduce risk of injury  - Consider OT/PT consult to assist with strengthening/mobility  Outcome: Progressing     Problem: PAIN - ADULT  Goal: Verbalizes/displays adequate comfort level or baseline comfort level  Description  Interventions:  - Encourage patient to monitor pain and request assistance  - Assess pain using appropriate pain scale  - Administer analgesics based on type and severity of pain and evaluate response  - Implement non-pharmacological measures as appropriate and evaluate response  - Consider cultural and social influences on pain and pain management  - Notify physician/advanced practitioner if interventions unsuccessful or patient reports new pain  Outcome: Progressing     Problem: INFECTION - ADULT  Goal: Absence or prevention of progression during hospitalization  Description  INTERVENTIONS:  - Assess and monitor for signs and symptoms of infection  - Monitor lab/diagnostic results  - Monitor all insertion sites, i e  indwelling lines, tubes, and drains  - Monitor endotracheal if appropriate and nasal secretions for changes in amount and color  - [place] appropriate cooling/warming therapies per order  - Administer medications as ordered  - Instruct and encourage patient and family to use good hand hygiene technique  - Identify and instruct in appropriate isolation precautions for identified infection/condition  Outcome: Progressing     Problem: SAFETY ADULT  Goal: Maintain or return to baseline ADL function  Description  INTERVENTIONS:  -  Assess patient's ability to carry out ADLs; assess patient's baseline for ADL function and identify physical deficits which impact ability to perform ADLs (bathing, care of mouth/teeth, toileting, grooming, dressing, etc )  - Assess/evaluate cause of self-care deficits   - Assess range of motion  - Assess patient's mobility; develop plan if impaired  - Assess patient's need for assistive devices and provide as appropriate  - Encourage maximum independence but intervene and supervise when necessary  - Involve family in performance of ADLs  - Assess for home care needs following discharge   - Consider OT consult to assist with ADL evaluation and planning for discharge  - Provide patient education as appropriate  Outcome: Progressing  Goal: Maintain or return mobility status to optimal level  Description  INTERVENTIONS:  - Assess patient's baseline mobility status (ambulation, transfers, stairs, etc )    - Identify cognitive and physical deficits and behaviors that affect mobility  - Identify mobility aids required to assist with transfers and/or ambulation (gait belt, sit-to-stand, lift, walker, cane, etc )  - [place] fall precautions as indicated by assessment  - Record patient progress and toleration of activity level on Mobility SBAR; progress patient to next Phase/Stage  - Instruct patient to call for assistance with activity based on assessment  - Consider rehabilitation consult to assist with strengthening/weightbearing, etc   Outcome: Progressing     Problem: DISCHARGE PLANNING  Goal: Discharge to home or other facility with appropriate resources  Description  INTERVENTIONS:  - Identify barriers to discharge w/patient and caregiver  - Arrange for needed discharge resources and transportation as appropriate  - Identify discharge learning needs (meds, wound care, etc )  - Arrange for interpretive services to assist at discharge as needed  - Refer to Case Management Department for coordinating discharge planning if the patient needs post-hospital services based on physician/advanced practitioner order or complex needs related to functional status, cognitive ability, or social support system  Outcome: Progressing     Problem: Knowledge Deficit  Goal: Patient/family/caregiver demonstrates understanding of disease process, treatment plan, medications, and discharge instructions  Description  Complete learning assessment and assess knowledge base    Interventions:  - Provide teaching at level of understanding  - Provide teaching via preferred learning methods  Outcome: Progressing     Problem: NEUROSENSORY - ADULT  Goal: Achieves stable or improved neurological status  Description  INTERVENTIONS  - Monitor and report changes in neurological status  - Monitor vital signs such as temperature, blood pressure, glucose, and any other labs ordered   - Initiate measures to prevent increased intracranial pressure  - Monitor for seizure activity and implement precautions if appropriate      Outcome: Progressing  Goal: Remains free of injury related to seizures activity  Description  INTERVENTIONS  - Maintain airway, patient safety  and administer oxygen as ordered  - Monitor patient for seizure activity, document and report duration and description of seizure to physician/advanced practitioner  - If seizure occurs,  ensure patient safety during seizure  - Reorient patient post seizure  - Seizure pads on all 4 side rails  - Instruct patient/family to notify RN of any seizure activity including if an aura is experienced  - Instruct patient/family to call for assistance with activity based on nursing assessment  - Administer anti-seizure medications if ordered    Outcome: Progressing  Goal: Achieves maximal functionality and self care  Description  INTERVENTIONS  - Monitor swallowing and airway patency with patient fatigue and changes in neurological status  - Encourage and assist patient to increase activity and self care     - Encourage visually impaired, hearing impaired and aphasic patients to use assistive/communication devices  Outcome: Progressing

## 2020-07-03 NOTE — ASSESSMENT & PLAN NOTE
Patient presents by EMS for altered mental status at home  Per daughter, patient reportedly having seizure-like activity as noted  Maintained on Keppra 500 mg b i d , continue  Neurology consult  Check UA  Neuro checks  PT/OT/case management consult

## 2020-07-03 NOTE — H&P
[address] [facility]  H&P- Soha Richard 1965, 62 y o  male MRN: [id number]    Unit/Bed#: E5 -01 Encounter: [id number]    Primary Care Provider: Ion Bradshaw DO   Date and time admitted to hospital: 7/3/2020 10:48 AM        * Acute encephalopathy  Assessment & Plan  Patient presents by EMS for altered mental status at home  Per daughter, patient reportedly having seizure-like activity as noted  Maintained on Keppra 500 mg b i d , continue  Neurology consult  Check UA  Neuro checks  PT/OT/case management consult    Transaminitis  Assessment & Plan  LFTs elevated, , , alk-phos 129  Has had elevated LFTs in the past  IV fluid hydration and recheck in a m  Ultrasound abdomen pending    History of pulmonary embolus (PE)  Assessment & Plan  Continue anticoagulation with Xarelto for history of PE    Hyperlipidemia  Assessment & Plan  Continue daily statin    GERD (gastroesophageal reflux disease)  Assessment & Plan  Continue Protonix b i d  Elevated lipase  Assessment & Plan  Lipase elevated to 994  Patient with nonspecific left-sided abdominal pain  IV fluid hydration  Diet as tolerated  Protonix    Type 2 diabetes mellitus with hyperglycemia, without long-term current use of insulin (HCC)  Assessment & Plan  Lab Results   Component Value Date    HGBA1C 9 1 (H) 05/21/2020       Recent Labs     07/03/20  1219   POCGLU 313*       Blood Sugar Average: Last 72 hrs:  (P) 313   Hold oral medications while inpatient  Continue home Lantus 14 units  Sliding-scale insulin with Accu-Cheks  Diabetic diet    Hypertension  Assessment & Plan  BP controlled at time of admission  Continue Norvasc  Hold losartan given [place]      VTE Prophylaxis: Rivaroxaban (Xarelto)  / sequential compression device   Code Status:  Full code  POLST: POLST form is not discussed and not completed at this time    Discussion with family:  Spoke with daughter over the phone    Anticipated Length of Stay:  Patient will be admitted on an Observation basis with an anticipated length of stay of  less than 2 midnights  Justification for Hospital Stay:  Acute encephalopathy, rule out seizure    Total Time for Visit, including Counseling / Coordination of Care: 1 hour  Greater than 50% of this total time spent on direct patient counseling and coordination of care  Chief Complaint:   Altered mental status    History of Present Illness:    Nola Godoy is a 62 y o  male with past medical history of seizure disorder, diabetes, hypertension, hyperlipidemia who presents with altered mental status  Patient was reportedly brought to ED for seizure-like activity as noted by his home health aide  Patient unable to describe any of the events that occurred today  Patient unsure what he was doing this morning, does not remember ride to hospital, does remember waking up in ED  Per daughter, patient's nurse called EMS for full body shaking  Noted to have history of seizure disorder, last seizure approximately 1 month ago, placed on Keppra at that time  Patient reports he has been having seizures for most of his life although has not had a seizure in many years, was maintained on Dilantin until a few years ago which she discontinued on his own as he was not having seizures  Patient denies any specific complaints  Patient does report some mild tenderness to left lower quadrant  Denies any nausea or vomiting at this time  Denies any headache, blurry vision, dizziness or lightheadedness  Review of Systems:    Review of Systems   Constitutional: Negative for chills and fever  HENT: Negative for trouble swallowing  Eyes: Negative for visual disturbance  Respiratory: Negative for cough and shortness of breath  Cardiovascular: Negative for chest pain and leg swelling  Gastrointestinal: Positive for abdominal pain and nausea  Negative for vomiting  Genitourinary: Negative for difficulty urinating     Musculoskeletal: Negative for gait problem  Skin: Negative for rash  Neurological: Positive for seizures  Negative for dizziness and weakness  Psychiatric/Behavioral: Negative for confusion  Past Medical and Surgical History:     Past Medical History:   Diagnosis Date    Diabetes mellitus ([address] )     GERD (gastroesophageal reflux disease)     Hyperlipidemia     Hypertension     Migraine     Psychiatric disorder        Past Surgical History:   Procedure Laterality Date    CHOLECYSTECTOMY      COLONOSCOPY      CT CYSTOGRAM  1/28/2020    ESOPHAGOGASTRODUODENOSCOPY N/A 9/7/2016    Procedure: ESOPHAGOGASTRODUODENOSCOPY (EGD); Surgeon: yNdia Mann MD;  Location: [facility]; Service:        Meds/Allergies:    Prior to Admission medications    Medication Sig Start Date End Date Taking?  Authorizing Provider   amLODIPine (NORVASC) 10 mg tablet Take 1 tablet (10 mg total) by mouth daily 8/17/18   Sonja Baeza MD   atorvastatin (LIPITOR) 40 mg tablet Take 40 mg by mouth daily    Historical Provider, MD   busPIRone (BUSPAR) 5 mg tablet Take 5 mg by mouth 2 (two) times a day 12/3/19   Historical Provider, MD   citalopram (CeleXA) 40 mg tablet Take 40 mg by mouth daily    Historical Provider, MD   famotidine (PEPCID) 20 mg tablet Take 1 tablet (20 mg total) by mouth daily 8/16/18   Sonja Baeza MD   ferrous sulfate 324 (65 Fe) mg Take 1 tablet (324 mg total) by mouth daily before breakfast 2/1/20   Annabelle Méndez PA-C   gabapentin (NEURONTIN) 100 mg capsule Take 1 capsule (100 mg total) by mouth 2 (two) times a day 8/16/18   Sonja Baeza MD   insulin glargine [facility] AUTHORITY KWIKPEN) 100 units/mL injection pen Inject 14 Units under the skin daily before breakfast 8/16/18   Sonja Baeza MD   JARDIANCE 10 MG TABS  2/18/20   Historical Provider, MD   losartan (COZAAR) 50 mg tablet Take 50 mg by mouth daily 10/23/19 10/22/20  Historical Provider, MD   meloxicam (MOBIC) 15 mg tablet Take 15 mg by mouth daily 3/28/19 3/27/20  Historical Provider, MD   metFORMIN (GLUCOPHAGE) 1000 MG tablet Take 1 tablet (1,000 mg total) by mouth daily with breakfast 8/17/18   Rosana Muir MD   mirtazapine (REMERON) 15 mg tablet Take 1 tablet (15 mg total) by mouth daily at bedtime 8/16/18   Rosana Muir MD   pantoprazole (PROTONIX) 40 mg tablet Take 1 tablet (40 mg total) by mouth 2 (two) times a day before meals 2/1/20   Annabelle Méndez PA-C   rivaroxaban (XARELTO) 20 mg tablet Take 20 mg by mouth 1/10/20   Historical Provider, MD THOMAS have reviewed home medications with patient personally  Allergies: Allergies   Allergen Reactions    Lisinopril Swelling       Social History:     Marital Status: Single   Occupation:  Unknown  Patient Pre-hospital Living Situation:  Home with home health aides  Patient Pre-hospital Level of Mobility:  Ambulatory  Patient Pre-hospital Diet Restrictions:  Diabetic  Substance Use History:   Social History     Substance and Sexual Activity   Alcohol Use Never     Social History     Tobacco Use   Smoking Status Former Smoker   Smokeless Tobacco Never Used     Social History     Substance and Sexual Activity   Drug Use Yes    Types: Marijuana       Family History:    History reviewed  No pertinent family history  Physical Exam:     Vitals:   Blood Pressure: 135/70 (07/03/20 1511)  Pulse: 83 (07/03/20 1511)  Temperature: (!) 97 4 °F (36 3 °C) (07/03/20 1511)  Temp Source: Temporal (07/03/20 1511)  Respirations: 18 (07/03/20 1511)  Height: 5' 6 5" (168 9 cm) (07/03/20 1436)  Weight - Scale: 62 4 kg (137 lb 9 1 oz) (07/03/20 1436)  SpO2: 100 % (07/03/20 1511)    Physical Exam   Constitutional: He is oriented to person, place, and time  No distress  Thin   HENT:   Head: Normocephalic and atraumatic  Eyes: Conjunctivae are normal  No scleral icterus  Neck: Neck supple  Cardiovascular: Normal rate, regular rhythm and normal heart sounds  No murmur heard    Pulmonary/Chest: Effort normal and breath sounds normal  No respiratory distress  He has no wheezes  Abdominal: Soft  Bowel sounds are normal  He exhibits no distension  There is tenderness (Minimal left-sided)  There is no guarding  Musculoskeletal: He exhibits no edema or deformity  Neurological: He is alert and oriented to person, place, and time  No cranial nerve deficit (CN II-XII grossly intact)  Skin: Skin is warm and dry  Psychiatric: He has a normal mood and affect  His behavior is normal  Thought content normal    Vitals reviewed  Additional Data:     Lab Results: I have personally reviewed pertinent reports  Results from last 7 days   Lab Units 07/03/20  1230   WBC Thousand/uL 7 82   HEMOGLOBIN g/dL 11 2*   HEMATOCRIT % 34 4*   PLATELETS Thousands/uL 160   NEUTROS PCT % 86*   LYMPHS PCT % 7*   MONOS PCT % 6   EOS PCT % 0     Results from last 7 days   Lab Units 07/03/20  1230   SODIUM mmol/L 140   POTASSIUM mmol/L 3 8   CHLORIDE mmol/L 103   CO2 mmol/L 26   BUN mg/dL 23   CREATININE mg/dL 1 31*   ANION GAP mmol/L 11   CALCIUM mg/dL 8 9   ALBUMIN g/dL 4 2   TOTAL BILIRUBIN mg/dL 0 68   ALK PHOS U/L 129*   ALT U/L 309*   AST U/L 188*   GLUCOSE RANDOM mg/dL 301*         Results from last 7 days   Lab Units 07/03/20  1219   POC GLUCOSE mg/dl 313*               Imaging: I have personally reviewed pertinent reports  CT head without contrast   ED Interpretation by Chelsea Salguero DO (07/03 1311)   IMPRESSION:       No acute intracranial abnormality           Final Result by Jose Maria Bustillo MD (07/03 1306)      No acute intracranial abnormality  Workstation performed: [id number]         US abdomen complete    (Results Pending)           [facility] / [facility] Records Reviewed: Yes     ** Please Note: This note has been constructed using a voice recognition system   **

## 2020-07-03 NOTE — ED NOTES
Pt  Putting fingers down throat in attempt to vomit  Pt advised to stop, but continues   Will continue to monitor     Corrie Duke RN  07/03/20 5619

## 2020-07-03 NOTE — ED NOTES
Patient transported to [address] [place] [address] Kaila Woodward, MAR[place]  07/03/20 5160

## 2020-07-03 NOTE — ASSESSMENT & PLAN NOTE
Lab Results   Component Value Date    HGBA1C 9 1 (H) 05/21/2020       Recent Labs     07/03/20  1219   POCGLU 313*       Blood Sugar Average: Last 72 hrs:  (P) 313   Hold oral medications while inpatient  Continue home Lantus 14 units  Sliding-scale insulin with Accu-Cheks  Diabetic diet

## 2020-07-03 NOTE — ASSESSMENT & PLAN NOTE
LFTs elevated, , , alk-phos 129  Has had elevated LFTs in the past  IV fluid hydration and recheck in a m    Ultrasound abdomen pending

## 2020-07-03 NOTE — ASSESSMENT & PLAN NOTE
Lipase elevated to 994  Patient with nonspecific left-sided abdominal pain  IV fluid hydration  Diet as tolerated  Protonix

## 2020-07-03 NOTE — ED PROVIDER NOTES
History  Chief Complaint   Patient presents with    Altered Mental Status     52-year-old male presenting for evaluation of altered mental status, agitation and nausea/vomiting  Patient is confused, does not know why he is here or how he got here  He is oriented to being at a hospital, does not know which hospital   Does not know the date/year  Per EMS report, home nurse called 911 because patient was acting agitated/confused  Reportedly patient has a history of epilepsy and they were unsure if he had a seizure  No prescribed any AEs  He was given Ativan en route by EMS  His only complaint is nausea and vomiting and sticking his fingers down his throat  He denies any other areas of pain  Will get Accu-Chek, treat with IV fluids/Zofran, CT H to rule out intracranial bleed patient does appear to be on Xarelto, abdominal labs, cardiac workup, coma panel, ammonia to rule out hepatic encephalopathy             Prior to Admission Medications   Prescriptions Last Dose Informant Patient Reported? Taking?    JARDIANCE 10 MG TABS  Self Yes No   amLODIPine (NORVASC) 10 mg tablet  Self No No   Sig: Take 1 tablet (10 mg total) by mouth daily   atorvastatin (LIPITOR) 40 mg tablet  Self Yes No   Sig: Take 40 mg by mouth daily   busPIRone (BUSPAR) 5 mg tablet  Self Yes No   Sig: Take 5 mg by mouth 2 (two) times a day   citalopram (CeleXA) 40 mg tablet  Self Yes No   Sig: Take 40 mg by mouth daily   famotidine (PEPCID) 20 mg tablet  Self No No   Sig: Take 1 tablet (20 mg total) by mouth daily   ferrous sulfate 324 (65 Fe) mg  Self No No   Sig: Take 1 tablet (324 mg total) by mouth daily before breakfast   gabapentin (NEURONTIN) 100 mg capsule  Self No No   Sig: Take 1 capsule (100 mg total) by mouth 2 (two) times a day   insulin glargine (BASAGLAR KWIKPEN) 100 units/mL injection pen  Self No No   Sig: Inject 14 Units under the skin daily before breakfast   losartan (COZAAR) 50 mg tablet  Self Yes No   Sig: Take 50 mg by mouth daily   meloxicam (MOBIC) 15 mg tablet  Self Yes No   Sig: Take 15 mg by mouth daily   metFORMIN (GLUCOPHAGE) 1000 MG tablet  Self No No   Sig: Take 1 tablet (1,000 mg total) by mouth daily with breakfast   mirtazapine (REMERON) 15 mg tablet  Self No No   Sig: Take 1 tablet (15 mg total) by mouth daily at bedtime   pantoprazole (PROTONIX) 40 mg tablet  Self No No   Sig: Take 1 tablet (40 mg total) by mouth 2 (two) times a day before meals   rivaroxaban (XARELTO) 20 mg tablet  Self Yes No   Sig: Take 20 mg by mouth      Facility-Administered Medications: None       Past Medical History:   Diagnosis Date    Diabetes mellitus ([facility] [address] )     GERD (gastroesophageal reflux disease)     Hyperlipidemia     Hypertension     Migraine     Psychiatric disorder        Past Surgical History:   Procedure Laterality Date    CHOLECYSTECTOMY      COLONOSCOPY      CT CYSTOGRAM  1/28/2020    ESOPHAGOGASTRODUODENOSCOPY N/A 9/7/2016    Procedure: ESOPHAGOGASTRODUODENOSCOPY (EGD); Surgeon: Saravanan Ferris MD;  Location: [facility]; Service:        History reviewed  No pertinent family history  I have reviewed and agree with the history as documented  E-Cigarette/Vaping     E-Cigarette/Vaping Substances     Social History     Tobacco Use    Smoking status: Former Smoker    Smokeless tobacco: Never Used   Substance Use Topics    Alcohol use: Never    Drug use: Yes     Types: Marijuana       Review of Systems   Constitutional: Negative for chills, fever and unexpected weight change  HENT: Negative for ear pain, rhinorrhea and sore throat  Eyes: Negative for pain and visual disturbance  Respiratory: Negative for cough and shortness of breath  Cardiovascular: Negative for chest pain and leg swelling  Gastrointestinal: Positive for nausea and vomiting  Negative for abdominal pain, constipation and diarrhea  Endocrine: Negative for polydipsia, polyphagia and polyuria     Genitourinary: Negative for dysuria, frequency, hematuria and urgency  Musculoskeletal: Negative for back pain, myalgias and neck pain  Skin: Negative for color change and rash  Allergic/Immunologic: Negative for environmental allergies and immunocompromised state  Neurological: Negative for dizziness, weakness, light-headedness, numbness and headaches  Hematological: Negative for adenopathy  Does not bruise/bleed easily  Psychiatric/Behavioral: Positive for confusion  Negative for agitation  All other systems reviewed and are negative  Physical Exam  Physical Exam   Constitutional: He appears well-developed and well-nourished  HENT:   Head: Normocephalic and atraumatic  Nose: Nose normal    Mouth/Throat: Oropharynx is clear and moist    Tongue: laceration noted to R side of tongue, no active bleeding  No signs of head trauma  No midline C/T/L  Spine ttp   Eyes: Conjunctivae and EOM are normal    Neck: Normal range of motion  Neck supple  Cardiovascular: Normal rate, regular rhythm, normal heart sounds and intact distal pulses  Pulmonary/Chest: Effort normal and breath sounds normal  No stridor  No respiratory distress  He has no wheezes  He has no rales  He exhibits no tenderness  Abdominal: Soft  He exhibits no distension  There is no tenderness  There is no rebound and no guarding  Retching, no abdominal ttp, negative Leon's sign   Musculoskeletal: He exhibits no edema or deformity  Neurological: He is alert  He exhibits normal muscle tone  Coordination normal    Oriented to self and being at hospital (does not know which), does not know why he is here, not oriented to time  Follows commands  Moving all extremities normally, strength equal throughout   Skin: Skin is warm and dry  No rash noted  Psychiatric: He has a normal mood and affect  Judgment and thought content normal    Nursing note and vitals reviewed        Vital Signs  ED Triage Vitals   Temperature Pulse Respirations Blood Pressure SpO2   07/03/20 1055 07/03/20 1055 07/03/20 1055 07/03/20 1055 07/03/20 1050   98 4 °F (36 9 °C) (!) 114 20 (!) 172/80 100 %      Temp Source Heart Rate Source Patient Position - Orthostatic VS BP Location FiO2 (%)   07/03/20 1055 07/03/20 1055 07/03/20 1055 07/03/20 1055 --   Oral Monitor Sitting Left arm       Pain Score       --                  Vitals:    07/03/20 1055 07/03/20 1231 07/03/20 1357   BP: (!) 172/80 129/60 144/75   Pulse: (!) 114 86 80   Patient Position - Orthostatic VS: Sitting Lying Lying         Visual Acuity      ED Medications  Medications   sodium chloride 0 9 % bolus 1,000 mL (1,000 mL Intravenous New Bag 7/3/20 1402)   LORazepam (FOR EMS ONLY) (ATIVAN) 2 mg/mL injection 2 mg (0 mg Does not apply Given to EMS 7/3/20 1149)   ondansetron (ZOFRAN) injection 4 mg (4 mg Intravenous Given 7/3/20 1149)       Diagnostic Studies  Results Reviewed     Procedure Component Value Units Date/Time    Acetaminophen level-If concentration is detectable, please discuss with medical  on call  [[id number]]  (Abnormal) Collected:  07/03/20 1230    Lab Status:  Final result Specimen:  Blood from Arm, Right Updated:  07/03/20 1316     Acetaminophen Level <5 1 ug/mL     Salicylate level [[id number]]  (Abnormal) Collected:  07/03/20 1230    Lab Status:  Final result Specimen:  Blood from Arm, Right Updated:  97/07/99 7224     Salicylate Lvl <3 mg/dL     Lipase [[id number]]  (Abnormal) Collected:  07/03/20 1230    Lab Status:  Final result Specimen:  Blood from Arm, Right Updated:  07/03/20 1309     Lipase 994 u/L     TSH, 3rd generation with Free T4 reflex [[id number]]  (Abnormal) Collected:  07/03/20 1230    Lab Status:  Final result Specimen:  Blood from Arm, Right Updated:  07/03/20 1309     TSH 3RD GENERATON 6 880 uIU/mL     Narrative:       Patients undergoing fluorescein dye angiography may retain small amounts of fluorescein in the body for 48-72 hours post procedure   Samples containing fluorescein can produce falsely depressed TSH values  If the patient had this procedure,a specimen should be resubmitted post fluorescein clearance  T4, free [[id number]] Collected:  07/03/20 1230    Lab Status:   In process Specimen:  Blood from Arm, Right Updated:  07/03/20 1309    Troponin I [[id number]]  (Normal) Collected:  07/03/20 1230    Lab Status:  Final result Specimen:  Blood from Arm, Right Updated:  07/03/20 1302     Troponin I <0 02 ng/mL     Comprehensive metabolic panel [[id number]]  (Abnormal) Collected:  07/03/20 1230    Lab Status:  Final result Specimen:  Blood from Arm, Right Updated:  07/03/20 1300     Sodium 140 mmol/L      Potassium 3 8 mmol/L      Chloride 103 mmol/L      CO2 26 mmol/L      ANION GAP 11 mmol/L      BUN 23 mg/dL      Creatinine 1 31 mg/dL      Glucose 301 mg/dL      Calcium 8 9 mg/dL       U/L       U/L      Alkaline Phosphatase 129 U/L      Total Protein 8 2 g/dL      Albumin 4 2 g/dL      Total Bilirubin 0 68 mg/dL      eGFR 70 ml/min/1 73sq m     Narrative:       [facility] guidelines for Chronic Kidney Disease (CKD):     Stage 1 with normal or high GFR (GFR > 90 mL/min/1 73 square meters)    Stage 2 Mild CKD (GFR = 60-89 mL/min/1 73 square meters)    Stage 3A Moderate CKD (GFR = 45-59 mL/min/1 73 square meters)    Stage 3B Moderate CKD (GFR = 30-44 mL/min/1 73 square meters)    Stage 4 Severe CKD (GFR = 15-29 mL/min/1 73 square meters)    Stage 5 End Stage CKD (GFR <15 mL/min/1 73 square meters)  Note: GFR calculation is accurate only with a steady state creatinine    Ammonia [[id number]]  (Normal) Collected:  07/03/20 1230    Lab Status:  Final result Specimen:  Blood from Arm, Right Updated:  07/03/20 1256     Ammonia 11 umol/L     Ethanol [[id number]]  (Normal) Collected:  07/03/20 1230    Lab Status:  Final result Specimen:  Blood from Arm, Right Updated:  07/03/20 1255     Ethanol Lvl 3 mg/dL     CBC and differential [[id number]]  (Abnormal) Collected:  07/03/20 1230    Lab Status:  Final result Specimen:  Blood from Arm, Right Updated:  07/03/20 1241     WBC 7 82 Thousand/uL      RBC 3 41 Million/uL      Hemoglobin 11 2 g/dL      Hematocrit 34 4 %       fL      MCH 32 8 pg      MCHC 32 6 g/dL      RDW 14 5 %      MPV 11 6 fL      Platelets 101 Thousands/uL      nRBC 0 /100 WBCs      Neutrophils Relative 86 %      Immat GRANS % 0 %      Lymphocytes Relative 7 %      Monocytes Relative 6 %      Eosinophils Relative 0 %      Basophils Relative 1 %      Neutrophils Absolute 6 71 Thousands/µL      Immature Grans Absolute 0 03 Thousand/uL      Lymphocytes Absolute 0 51 Thousands/µL      Monocytes Absolute 0 48 Thousand/µL      Eosinophils Absolute 0 02 Thousand/µL      Basophils Absolute 0 07 Thousands/µL     Fingerstick Glucose (POCT) [[id number]]  (Abnormal) Collected:  07/03/20 1219    Lab Status:  Final result Updated:  07/03/20 1235     POC Glucose 313 mg/dl     POCT urinalysis dipstick [[id number]]     Lab Status:  No result                  CT head without contrast   ED Interpretation by Zuri Ramirez DO (07/03 1311)   IMPRESSION:       No acute intracranial abnormality           Final Result by Des Noriega MD (07/03 1306)      No acute intracranial abnormality  Workstation performed: [id number]                    Procedures  Procedures         ED Course  ED Course as of Jul 03 1419 Fri Jul 03, 2020   1130 EKG: sinus tachycardia @ 111 bpm, normal axis, normal intervals, no st changes      1254 Pt sleepy but more awake than earlier  He does state history of seizures, but hadn't had a seizure in a long time until today  Previously on Dilantin      1255 MEDICAL ALCOHOL: 3   1302 0 86 5 months ago   Creatinine(!): 1 31   1302 AST(!): 188   1302 ALT(!): 309   1311 1 29 5 months ago   TSH 3RD GENERATON(!): 6 880   1311 Lipase(!): 994   1318 Pt still sleepy  He denies drinking ETOH to suggest etoh withdrawal sz  MDM  Number of Diagnoses or Management Options  Altered mental status:   Nausea and vomiting:   Pancreatitis:   Transaminitis:   Diagnosis management comments: 63 yo M presenting with AMS, possibly breath through seizure with history of same in the past, did bite tongue and mental status slowly improving, still sleepy at time of admission  Pt noted to have elvated lipase with N/V, pancreatitis with h/o pancreatitis in the past  Also with slight AFR[place] and transaminitis  Pt lives alone at home, therefore admitetted for observation for AMS and unsafe to go home in this mental state and can be observed for any seizures  Amount and/or Complexity of Data Reviewed  Clinical lab tests: ordered and reviewed  Tests in the radiology section of CPT®: ordered and reviewed  Tests in the medicine section of CPT®: ordered and reviewed  Review and summarize past medical records: yes  Independent visualization of images, tracings, or specimens: yes          Disposition  Final diagnoses: Altered mental status   Pancreatitis   Nausea and vomiting   Transaminitis     Time reflects when diagnosis was documented in both MDM as applicable and the Disposition within this note     Time User Action Codes Description Comment    7/3/2020  1:26 PM Lorena Islas Add [R41 82] Altered mental status     7/3/2020  1:26 PM Eboni Arnel A Add [K85 90] Pancreatitis     7/3/2020  1:26 PM Lorena Islas Add [R11 2] Nausea and vomiting     7/3/2020  1:26 PM Lorena Islas Add [R74 0] Transaminitis       ED Disposition     ED Disposition Condition Date/Time Comment    Admit Stable Fri Jul 3, 2020  1:26 PM Case was discussed with OLIVIER and the patient's admission status was agreed to be Admission Status: observation status to the service of Dr Shani Mcgovern           Follow-up Information     Follow up With Specialties Details Why [address] [facility] , DO Family Medicine   [address] [facility] [id number]  [phone]            Patient's Medications   Discharge Prescriptions    No medications on file     No discharge procedures on file      PDMP Review     None          ED Provider  Electronically Signed by           Zuri Ramirez DO  07/03/20 8071

## 2020-07-04 ENCOUNTER — APPOINTMENT (OUTPATIENT)
Dept: ULTRASOUND IMAGING | Facility: HOSPITAL | Age: 55
End: 2020-07-04
Payer: COMMERCIAL

## 2020-07-04 PROBLEM — Z79.01 CHRONIC ANTICOAGULATION: Status: ACTIVE | Noted: 2020-01-28

## 2020-07-04 LAB
ANION GAP SERPL CALCULATED.3IONS-SCNC: 8 MMOL/L (ref 4–13)
ATRIAL RATE: 111 BPM
BASOPHILS # BLD AUTO: 0.07 THOUSANDS/ΜL (ref 0–0.1)
BASOPHILS NFR BLD AUTO: 1 % (ref 0–1)
BUN SERPL-MCNC: 17 MG/DL (ref 5–25)
CALCIUM SERPL-MCNC: 8.5 MG/DL (ref 8.3–10.1)
CHLORIDE SERPL-SCNC: 108 MMOL/L (ref 100–108)
CK MB SERPL-MCNC: 4.3 NG/ML (ref 0–5)
CK MB SERPL-MCNC: <1 % (ref 0–2.5)
CK SERPL-CCNC: 749 U/L (ref 39–308)
CO2 SERPL-SCNC: 26 MMOL/L (ref 21–32)
CREAT SERPL-MCNC: 0.92 MG/DL (ref 0.6–1.3)
EOSINOPHIL # BLD AUTO: 0.04 THOUSAND/ΜL (ref 0–0.61)
EOSINOPHIL NFR BLD AUTO: 0 % (ref 0–6)
ERYTHROCYTE [DISTWIDTH] IN BLOOD BY AUTOMATED COUNT: 14.6 % (ref 11.6–15.1)
GFR SERPL CREATININE-BSD FRML MDRD: 107 ML/MIN/1.73SQ M
GLUCOSE SERPL-MCNC: 106 MG/DL (ref 65–140)
GLUCOSE SERPL-MCNC: 140 MG/DL (ref 65–140)
GLUCOSE SERPL-MCNC: 238 MG/DL (ref 65–140)
GLUCOSE SERPL-MCNC: 294 MG/DL (ref 65–140)
GLUCOSE SERPL-MCNC: 57 MG/DL (ref 65–140)
GLUCOSE SERPL-MCNC: 63 MG/DL (ref 65–140)
HCT VFR BLD AUTO: 31.4 % (ref 36.5–49.3)
HGB BLD-MCNC: 10 G/DL (ref 12–17)
IMM GRANULOCYTES # BLD AUTO: 0.02 THOUSAND/UL (ref 0–0.2)
IMM GRANULOCYTES NFR BLD AUTO: 0 % (ref 0–2)
LYMPHOCYTES # BLD AUTO: 1.13 THOUSANDS/ΜL (ref 0.6–4.47)
LYMPHOCYTES NFR BLD AUTO: 11 % (ref 14–44)
MAGNESIUM SERPL-MCNC: 2.1 MG/DL (ref 1.6–2.6)
MCH RBC QN AUTO: 32.2 PG (ref 26.8–34.3)
MCHC RBC AUTO-ENTMCNC: 31.8 G/DL (ref 31.4–37.4)
MCV RBC AUTO: 101 FL (ref 82–98)
MONOCYTES # BLD AUTO: 0.64 THOUSAND/ΜL (ref 0.17–1.22)
MONOCYTES NFR BLD AUTO: 6 % (ref 4–12)
NEUTROPHILS # BLD AUTO: 8.42 THOUSANDS/ΜL (ref 1.85–7.62)
NEUTS SEG NFR BLD AUTO: 82 % (ref 43–75)
NRBC BLD AUTO-RTO: 0 /100 WBCS
P AXIS: 84 DEGREES
PLATELET # BLD AUTO: 166 THOUSANDS/UL (ref 149–390)
PMV BLD AUTO: 11.7 FL (ref 8.9–12.7)
POTASSIUM SERPL-SCNC: 3.7 MMOL/L (ref 3.5–5.3)
PR INTERVAL: 180 MS
QRS AXIS: 61 DEGREES
QRSD INTERVAL: 96 MS
QT INTERVAL: 340 MS
QTC INTERVAL: 462 MS
RBC # BLD AUTO: 3.11 MILLION/UL (ref 3.88–5.62)
SODIUM SERPL-SCNC: 142 MMOL/L (ref 136–145)
T WAVE AXIS: 72 DEGREES
VENTRICULAR RATE: 111 BPM
WBC # BLD AUTO: 10.32 THOUSAND/UL (ref 4.31–10.16)

## 2020-07-04 PROCEDURE — 82550 ASSAY OF CK (CPK): CPT | Performed by: INTERNAL MEDICINE

## 2020-07-04 PROCEDURE — 80048 BASIC METABOLIC PNL TOTAL CA: CPT | Performed by: PHYSICIAN ASSISTANT

## 2020-07-04 PROCEDURE — 99225 PR SBSQ OBSERVATION CARE/DAY 25 MINUTES: CPT | Performed by: INTERNAL MEDICINE

## 2020-07-04 PROCEDURE — 82948 REAGENT STRIP/BLOOD GLUCOSE: CPT

## 2020-07-04 PROCEDURE — 76700 US EXAM ABDOM COMPLETE: CPT

## 2020-07-04 PROCEDURE — 82553 CREATINE MB FRACTION: CPT | Performed by: INTERNAL MEDICINE

## 2020-07-04 PROCEDURE — 83735 ASSAY OF MAGNESIUM: CPT | Performed by: PHYSICIAN ASSISTANT

## 2020-07-04 PROCEDURE — 80177 DRUG SCRN QUAN LEVETIRACETAM: CPT | Performed by: INTERNAL MEDICINE

## 2020-07-04 PROCEDURE — 99245 OFF/OP CONSLTJ NEW/EST HI 55: CPT | Performed by: PSYCHIATRY & NEUROLOGY

## 2020-07-04 PROCEDURE — 85025 COMPLETE CBC W/AUTO DIFF WBC: CPT | Performed by: PHYSICIAN ASSISTANT

## 2020-07-04 PROCEDURE — 93010 ELECTROCARDIOGRAM REPORT: CPT | Performed by: INTERNAL MEDICINE

## 2020-07-04 RX ORDER — INSULIN GLARGINE 100 [IU]/ML
10 INJECTION, SOLUTION SUBCUTANEOUS EVERY MORNING
Status: DISCONTINUED | OUTPATIENT
Start: 2020-07-05 | End: 2020-07-05 | Stop reason: HOSPADM

## 2020-07-04 RX ORDER — DEXTROSE MONOHYDRATE 25 G/50ML
INJECTION, SOLUTION INTRAVENOUS
Status: COMPLETED
Start: 2020-07-04 | End: 2020-07-04

## 2020-07-04 RX ORDER — LEVETIRACETAM 750 MG/1
750 TABLET ORAL EVERY 12 HOURS SCHEDULED
Status: DISCONTINUED | OUTPATIENT
Start: 2020-07-04 | End: 2020-07-05 | Stop reason: HOSPADM

## 2020-07-04 RX ORDER — DEXTROSE AND SODIUM CHLORIDE 5; .45 G/100ML; G/100ML
75 INJECTION, SOLUTION INTRAVENOUS CONTINUOUS
Status: DISCONTINUED | OUTPATIENT
Start: 2020-07-04 | End: 2020-07-04

## 2020-07-04 RX ORDER — LEVETIRACETAM 500 MG/1
250 TABLET ORAL ONCE
Status: COMPLETED | OUTPATIENT
Start: 2020-07-04 | End: 2020-07-04

## 2020-07-04 RX ADMIN — AMLODIPINE BESYLATE 10 MG: 10 TABLET ORAL at 08:19

## 2020-07-04 RX ADMIN — GABAPENTIN 100 MG: 100 CAPSULE ORAL at 08:19

## 2020-07-04 RX ADMIN — RIVAROXABAN 20 MG: 20 TABLET, FILM COATED ORAL at 08:19

## 2020-07-04 RX ADMIN — PANTOPRAZOLE SODIUM 40 MG: 40 TABLET, DELAYED RELEASE ORAL at 05:04

## 2020-07-04 RX ADMIN — DEXTROSE MONOHYDRATE 50 ML: 25 INJECTION, SOLUTION INTRAVENOUS at 08:00

## 2020-07-04 RX ADMIN — BUSPIRONE HYDROCHLORIDE 5 MG: 5 TABLET ORAL at 08:19

## 2020-07-04 RX ADMIN — CITALOPRAM HYDROBROMIDE 40 MG: 20 TABLET ORAL at 08:19

## 2020-07-04 RX ADMIN — FERROUS SULFATE TAB 325 MG (65 MG ELEMENTAL FE) 325 MG: 325 (65 FE) TAB at 08:19

## 2020-07-04 RX ADMIN — DEXTROSE AND SODIUM CHLORIDE 75 ML/HR: 5; .45 INJECTION, SOLUTION INTRAVENOUS at 08:19

## 2020-07-04 RX ADMIN — LEVETIRACETAM 500 MG: 500 TABLET ORAL at 08:19

## 2020-07-04 RX ADMIN — BUSPIRONE HYDROCHLORIDE 5 MG: 5 TABLET ORAL at 17:31

## 2020-07-04 RX ADMIN — INSULIN LISPRO 2 UNITS: 100 INJECTION, SOLUTION INTRAVENOUS; SUBCUTANEOUS at 16:08

## 2020-07-04 RX ADMIN — MIRTAZAPINE 15 MG: 15 TABLET, FILM COATED ORAL at 21:57

## 2020-07-04 RX ADMIN — GABAPENTIN 100 MG: 100 CAPSULE ORAL at 17:31

## 2020-07-04 RX ADMIN — INSULIN LISPRO 3 UNITS: 100 INJECTION, SOLUTION INTRAVENOUS; SUBCUTANEOUS at 22:00

## 2020-07-04 RX ADMIN — LEVETIRACETAM 750 MG: 750 TABLET, FILM COATED ORAL at 21:57

## 2020-07-04 RX ADMIN — PANTOPRAZOLE SODIUM 40 MG: 40 TABLET, DELAYED RELEASE ORAL at 16:08

## 2020-07-04 RX ADMIN — LEVETIRACETAM 250 MG: 500 TABLET ORAL at 09:31

## 2020-07-04 NOTE — ASSESSMENT & PLAN NOTE
Chronic LFT elevation for review of [facility] record  Extensive workup done at [facility] last May 2020 which included MRI of the liver/abdomen, CT of the abdomen and pelvis and hepatitis panel  He was negative for hepatitis  Liver was unremarkable except for hemangioma    · Check CPK level, if elevated, transaminitis could be related to rhabdomyolysis from tonic-clonic seizure  · Repeat LFTs in a m

## 2020-07-04 NOTE — CONSULTS
Consultation - Neurology   Silvia Miller 62 y o  male MRN: [id number]  Unit/Bed#: E5 -01 Encounter: [id number]    Assessment:  Recurrent seizure in setting of longstanding epilepsy, potentially provoked in setting of illness vs  subtherapeutic dosing of Keppra  Now at baseline  Plan:  -continue neurochecks, seizure precautions  -increased Keppra to 750mg BID  -HCT reviewed - unremarkable for any acute pathology  -no need for EEG or MRI at this time  -pt is to follow up at [facility]  -no further inpatient recommendations; call with questions or changes      Reason for Consult / Principal Problem: seizure  Hx and PE limited by: loss of memory    HPI: Silvia Miller is a 62 y o  male with epilepsy, DM, HLD, HTN and migraines who presents with reported episode of seizure-like activity with AMS afterwards yesterday morning  Per report, home health aide saw seizure-like activity described as generalized shaking and called EMS  He did not have any seizure-like activity upon arrival to the ED however he was disoriented and lethargic at the time, eventually resolving back to baseline  HCT was unremarkable  Since then he has had no complaints and currently feels well  He does not recall any events from yesterday morning nor does he remember where he was at the time but does remember waking up at the hospital and everything since  No further seizure activity noted while admitted and pt denies any further events  He states that his tongue is sore from likely biting it  Denies incontinence  He was found to have AFR[place], elevated LFTs, and hyperglycemic to 301, which has since resolved  He reports that he has had epilepsy since he was a teenager and had been on phenytoin for a very long time before he discontinued it many years ago as he did not have any further seizures  He presented to [facility] on 5/28 with another seizure   He had an MRI brain at that time that was unremarkable and a routine EEG that revealed diffuse slowing but no epileptiform activity  He was initiated on Keppra and discharged with plan for outpatient f/u  He reports compliance with his medications and denies having missed any doses  States that his prior seizures were generalized with tongue biting and incontinence  Inpatient consult to Neurology  Consult performed by: Tri Boyd DO  Consult ordered by: Susie Marie PA-C        Review of Systems   Musculoskeletal: Positive for arthralgias  Neurological: Positive for seizures  All other systems reviewed and are negative  Historical Information   Past Medical History:   Diagnosis Date    Diabetes mellitus ([facility] [address] )     GERD (gastroesophageal reflux disease)     Hyperlipidemia     Hypertension     Migraine     Psychiatric disorder      Past Surgical History:   Procedure Laterality Date    CHOLECYSTECTOMY      COLONOSCOPY      CT CYSTOGRAM  1/28/2020    ESOPHAGOGASTRODUODENOSCOPY N/A 9/7/2016    Procedure: ESOPHAGOGASTRODUODENOSCOPY (EGD); Surgeon: Jem Paulson MD;  Location: [facility]; Service:      Social History   Social History     Substance and Sexual Activity   Alcohol Use Never     Social History     Substance and Sexual Activity   Drug Use Yes    Types: Marijuana     E-Cigarette/Vaping     E-Cigarette/Vaping Substances     Social History     Tobacco Use   Smoking Status Former Smoker   Smokeless Tobacco Never Used     Family History: History reviewed  No pertinent family history  Review of previous medical records was completed      Meds/Allergies   all current active meds have been reviewed, current meds:   Current Facility-Administered Medications   Medication Dose Route Frequency    acetaminophen (TYLENOL) tablet 650 mg  650 mg Oral Q6H PRN    amLODIPine (NORVASC) tablet 10 mg  10 mg Oral Daily    busPIRone (BUSPAR) tablet 5 mg  5 mg Oral BID    calcium carbonate (TUMS) chewable tablet 1,000 mg  1,000 mg Oral TID PRN    citalopram (CeleXA) tablet 40 mg  40 mg Oral Daily    ferrous sulfate tablet 325 mg  325 mg Oral Daily With Breakfast    gabapentin (NEURONTIN) capsule 100 mg  100 mg Oral BID    [START ON 7/5/2020] insulin glargine (LANTUS) subcutaneous injection 10 Units 0 1 mL  10 Units Subcutaneous QAM    insulin lispro (HumaLOG) 100 units/mL subcutaneous injection 1-5 Units  1-5 Units Subcutaneous TID AC    insulin lispro (HumaLOG) 100 units/mL subcutaneous injection 1-5 Units  1-5 Units Subcutaneous HS    levETIRAcetam (KEPPRA) tablet 750 mg  750 mg Oral Q12H [address]    mirtazapine (REMERON) tablet 15 mg  15 mg Oral HS    ondansetron (ZOFRAN) injection 4 mg  4 mg Intravenous Q6H PRN    pantoprazole (PROTONIX) EC tablet 40 mg  40 mg Oral BID AC    rivaroxaban (XARELTO) tablet 20 mg  20 mg Oral Daily With Breakfast    senna (SENOKOT) tablet 8 6 mg  1 tablet Oral HS PRN    and PTA meds:   Prior to Admission Medications   Prescriptions Last Dose Informant Patient Reported? Taking?    JARDIANCE 10 MG TABS Unknown at Unknown time Self Yes No   amLODIPine (NORVASC) 10 mg tablet Unknown at Unknown time Self No No   Sig: Take 1 tablet (10 mg total) by mouth daily   atorvastatin (LIPITOR) 40 mg tablet Unknown at Unknown time Self Yes No   Sig: Take 40 mg by mouth daily   busPIRone (BUSPAR) 5 mg tablet Unknown at Unknown time Self Yes No   Sig: Take 5 mg by mouth 2 (two) times a day   citalopram (CeleXA) 40 mg tablet Unknown at Unknown time Self Yes No   Sig: Take 40 mg by mouth daily   famotidine (PEPCID) 20 mg tablet Unknown at Unknown time Self No No   Sig: Take 1 tablet (20 mg total) by mouth daily   ferrous sulfate 324 (65 Fe) mg Unknown at Unknown time Self No No   Sig: Take 1 tablet (324 mg total) by mouth daily before breakfast   gabapentin (NEURONTIN) 100 mg capsule Unknown at Unknown time Self No No   Sig: Take 1 capsule (100 mg total) by mouth 2 (two) times a day   insulin glargine (BASAGLAR KWIKPEN) 100 units/mL injection pen Unknown at Unknown time Self No No Sig: Inject 14 Units under the skin daily before breakfast   losartan (COZAAR) 50 mg tablet Unknown at Unknown time Self Yes No   Sig: Take 50 mg by mouth daily   meloxicam (MOBIC) 15 mg tablet  Self Yes No   Sig: Take 15 mg by mouth daily   metFORMIN (GLUCOPHAGE) 1000 MG tablet Unknown at Unknown time Self No No   Sig: Take 1 tablet (1,000 mg total) by mouth daily with breakfast   mirtazapine (REMERON) 15 mg tablet Unknown at Unknown time Self No No   Sig: Take 1 tablet (15 mg total) by mouth daily at bedtime   pantoprazole (PROTONIX) 40 mg tablet Unknown at Unknown time Self No No   Sig: Take 1 tablet (40 mg total) by mouth 2 (two) times a day before meals   rivaroxaban (XARELTO) 20 mg tablet Unknown at Unknown time Self Yes No   Sig: Take 20 mg by mouth      Facility-Administered Medications: None       Allergies   Allergen Reactions    Lisinopril Swelling       Objective   Vitals:Blood pressure 125/67, pulse 77, temperature 98 3 °F (36 8 °C), temperature source Tympanic, resp  rate 18, height 5' 6 5" (1 689 m), weight 62 4 kg (137 lb 9 1 oz), SpO2 99 %  ,Body mass index is 21 87 kg/m²  Intake/Output Summary (Last 24 hours) at 7/4/2020 1109  Last data filed at 7/4/2020 0901  Gross per 24 hour   Intake 3731 25 ml   Output 700 ml   Net 3031 25 ml       Invasive Devices: Invasive Devices     Peripheral Intravenous Line            Peripheral IV 07/03/20 Right Wrist 1 day                Physical Exam   Constitutional: He is oriented to person, place, and time  He appears well-developed and well-nourished  HENT:   Head: Normocephalic and atraumatic  Eyes: Pupils are equal, round, and reactive to light  EOM are normal    Neck: Normal range of motion  Neck supple  Cardiovascular: Normal rate, regular rhythm and normal heart sounds  Pulmonary/Chest: Effort normal and breath sounds normal    Abdominal: Soft  Bowel sounds are normal    Neurological: He is alert and oriented to person, place, and time   He has normal strength  He has a normal Finger-Nose-Finger Test and a normal Heel to [employer]    Psychiatric: His speech is normal      Neurologic Exam     Mental Status   Oriented to person, place, and time  Attention: normal  Concentration: normal    Speech: speech is normal   Level of consciousness: alert  Knowledge: good  Able to name object  Able to repeat  Normal comprehension  Cranial Nerves     CN II   Visual fields full to confrontation  CN III, IV, VI   Pupils are equal, round, and reactive to light  Extraocular motions are normal      CN V   Facial sensation intact  CN VII   Facial expression full, symmetric  CN VIII   Hearing: intact    CN IX, X   Palate: symmetric    CN XI   Right sternocleidomastoid strength: normal  Left sternocleidomastoid strength: normal  Right trapezius strength: normal  Left trapezius strength: normal    CN XII   Tongue deviation: none    Motor Exam     Strength   Strength 5/5 throughout  Sensory Exam   Light touch normal      Gait, Coordination, and Reflexes     Coordination   Finger to nose coordination: normal  Heel to shin coordination: normal    Reflexes   Reflexes 2+ except as noted  Lab Results:   CBC:   Results from last 7 days   Lab Units 07/04/20  0449 07/03/20  1230   WBC Thousand/uL 10 32* 7 82   RBC Million/uL 3 11* 3 41*   HEMOGLOBIN g/dL 10 0* 11 2*   HEMATOCRIT % 31 4* 34 4*   MCV fL 101* 101*   PLATELETS Thousands/uL 166 160   , BMP/CMP:   Results from last 7 days   Lab Units 07/04/20  0454 07/03/20  1230   SODIUM mmol/L 142 140   POTASSIUM mmol/L 3 7 3 8   CHLORIDE mmol/L 108 103   CO2 mmol/L 26 26   BUN mg/dL 17 23   CREATININE mg/dL 0 92 1 31*   CALCIUM mg/dL 8 5 8 9   AST U/L  --  188*   ALT U/L  --  309*   ALK PHOS U/L  --  129*   EGFR ml/min/1 73sq m 107 70   , Drug Screen:     Imaging Studies: I have personally reviewed pertinent films in PACS  EKG, Pathology, and Other Studies: I have personally reviewed pertinent reports      VTE Prophylaxis: Sequential compression device (Venodyne)     Code Status: Level 1 - Full Code

## 2020-07-04 NOTE — ASSESSMENT & PLAN NOTE
History of pancreatitis, chronic per [facility] record  Extensive workup done during his last admission at [facility] view including MRI/and CT of the abdomen  Recheck lipase and LFTs in a m

## 2020-07-04 NOTE — NURSING NOTE
Pt blood sugar this am 57  Pt NPO for us of abd  Difficult to determine if pt symptomatic secondary to baseline confusion  MD notified and hypoglycemic protocol for NPO pt initiated  At MD direction- full amp of dextrose given and IVF started  Blood sugar recheck in 15 mins and held all insulins  Call bell in reach  Will continue to monitor

## 2020-07-04 NOTE — ASSESSMENT & PLAN NOTE
Lab Results   Component Value Date    HGBA1C 9 1 (H) 05/21/2020       Recent Labs     07/03/20  1547 07/03/20  2100 07/04/20  0751 07/04/20  0905   POCGLU 178* 277* 57* 140       Blood Sugar Average: Last 72 hrs:  (P) 193   Fasting hypoglycemia this morning at 57  Decrease Lantus to 10 units  Watch for recurrent hypoglycemia

## 2020-07-04 NOTE — NURSING NOTE
Pt requested that his bed alarm be turned off  I advised against it, since he is on Seizure precaution  Pt confirmed they understood the risks involved in not having the bed alarm on

## 2020-07-04 NOTE — UTILIZATION REVIEW
Initial Clinical Review    Admission: Date/Time/Statement: Admission Orders (From admission, onward)     Ordered        07/03/20 1327  Place in Observation  Once                   Orders Placed This Encounter   Procedures    Place in Observation     Standing Status:   Standing     Number of Occurrences:   1     Order Specific Question:   Admitting Physician     Answer:   Paramjit Viveros [[id number]]     Order Specific Question:   Level of Care     Answer:   Med Surg [[id number]]     ED Arrival Information     Expected Arrival Acuity Means of Arrival Escorted By Service Admission Type    - [facility]/2020 10:[facility] Hospitalist Emergency    Arrival Complaint    altered mental status        Chief Complaint   Patient presents with    Altered Mental Status     Assessment/Plan: 25-year-old male presenting for evaluation of altered mental status, agitation and nausea/vomiting  Patient is confused, does not know why he is here or how he got here  He is oriented to being at a hospital, does not know which hospital   Does not know the date/year  Per EMS report, home nurse called 911 because patient was acting agitated/confused  Reportedly patient has a history of epilepsy and they were unsure if he had a seizure  No prescribed any AEs  He was given Ativan en route by EMS  His only complaint is nausea and vomiting and sticking fingers down his throat  Oriented to self and being at hospital (does not know which), does not know why he is here, not oriented to time  Follows commands  Moving all extremities normally, strength equal throughout  Tongue: laceration noted to R side of tongue, no active bleeding  No signs of head trauma   No midline C/T/L  Spine ttp  Plan continue keppre IVF for transaminitis and elevated lipase     ED Triage Vitals   Temperature Pulse Respirations Blood Pressure SpO2   07/03/20 1055 07/03/20 1055 07/03/20 1055 07/03/20 1055 07/03/20 1050   98 4 °F (36 9 °C) (!) 114 20 (!) 172/80 100 %      Temp Source Heart Rate Source Patient Position - Orthostatic VS BP Location FiO2 (%)   07/03/20 1055 07/03/20 1055 07/03/20 1055 07/03/20 1055 --   Oral Monitor Sitting Left arm       Pain Score       07/03/20 1429       No Pain        Wt Readings from Last 1 Encounters:   07/03/20 62 4 kg (137 lb 9 1 oz)     Additional Vital Signs:   Date/Time  Temp  Pulse  Resp  BP  SpO2  O2 Device  Patient Position - Orthostatic VS   07/04/20 0743  98 3 °F (36 8 °C)  77  18  125/67  99 %  None (Room air)  Lying   07/03/20 2203  98 3 °F (36 8 °C)  81  17  146/83  100 %  None (Room air)  Lying   07/03/20 1511  97 4 °F (36 3 °C)Abnormal   83  18  135/70  100 %  None (Room air)  Lying   07/03/20 1357    80  16  144/75  100 %  None (Room air)  Lying   07/03/20 1231    86  16  129/60  100 %  None (Room air)  Lying       Pertinent Labs/Diagnostic Test Results:       Results from last 7 days   Lab Units 07/04/20  0449 07/03/20  1230   WBC Thousand/uL 10 32* 7 82   HEMOGLOBIN g/dL 10 0* 11 2*   HEMATOCRIT % 31 4* 34 4*   PLATELETS Thousands/uL 166 160   NEUTROS ABS Thousands/µL 8 42* 6 71         Results from last 7 days   Lab Units 07/04/20  0454 07/03/20  1230   SODIUM mmol/L 142 140   POTASSIUM mmol/L 3 7 3 8   CHLORIDE mmol/L 108 103   CO2 mmol/L 26 26   ANION GAP mmol/L 8 11   BUN mg/dL 17 23   CREATININE mg/dL 0 92 1 31*   EGFR ml/min/1 73sq m 107 70   CALCIUM mg/dL 8 5 8 9   MAGNESIUM mg/dL 2 1  --      Results from last 7 days   Lab Units 07/03/20  1230   AST U/L 188*   ALT U/L 309*   ALK PHOS U/L 129*   TOTAL PROTEIN g/dL 8 2   ALBUMIN g/dL 4 2   TOTAL BILIRUBIN mg/dL 0 68   AMMONIA umol/L 11     Results from last 7 days   Lab Units 07/04/20  0905 07/04/20  0751 07/03/20  2100 07/03/20  1547 07/03/20  1219   POC GLUCOSE mg/dl 140 57* 277* 178* 313*     Results from last 7 days   Lab Units 07/04/20  0454 07/03/20  1230   GLUCOSE RANDOM mg/dL 63* 301*     Results from last 7 days   Lab Units 07/03/20  1230   TROPONIN I ng/mL <0 02     Results from last 7 days   Lab Units 07/03/20  1230   TSH 3RD GENERATON uIU/mL 6 880*     Results from last 7 days   Lab Units 07/03/20  1230   LIPASE u/L 994*             Results from last 7 days   Lab Units 07/03/20  1739   CLARITY UA  Clear   COLOR UA  Yellow   SPEC GRAV UA  1 010   PH UA  6 0   GLUCOSE UA mg/dl >=1000 (1%)*   KETONES UA mg/dl Negative   BLOOD UA  Moderate*   PROTEIN UA mg/dl 100 (2+)*   NITRITE UA  Negative   BILIRUBIN UA  Negative   UROBILINOGEN UA E U /dl 0 2   LEUKOCYTES UA  Elevated glucose may cause decreased leukocyte values  See urine microscopic for [facility] result/*   WBC UA /hpf 2-4*   RBC UA /hpf 1-2*   BACTERIA UA /hpf Occasional   EPITHELIAL CELLS WET PREP /hpf Occasional     Results from last 7 days   Lab Units 07/03/20  1230   ETHANOL LVL mg/dL 3   ACETAMINOPHEN LVL ug/mL <9 5*   SALICYLATE LVL mg/dL <3*       CT head 07-03-20  WNL  EKG 07-03-20  Sinus tachycardia  When compared with ECG of 02-JUL-2020 19:06,  Questionable change in QRS duration  Questionable change in initial forces of Septal leads        ED Treatment:   Medication Administration from 07/03/2020 1048 to 07/03/2020 1423       Date/Time Order Dose Route Action     07/03/2020 1149 ondansetron (ZOFRAN) injection 4 mg 4 mg Intravenous Given     07/03/2020 1402 sodium chloride 0 9 % bolus 1,000 mL 1,000 mL Intravenous New Bag        Past Medical History:   Diagnosis Date    Diabetes mellitus ([facility] [address] )     GERD (gastroesophageal reflux disease)     Hyperlipidemia     Hypertension     Migraine     Psychiatric disorder      Present on Admission:   Elevated lipase   GERD (gastroesophageal reflux disease)   Hyperlipidemia   Hypertension   Type 2 diabetes mellitus with hyperglycemia, without long-term current use of insulin (HCC)      Admitting Diagnosis: Pancreatitis [K85 90]  Altered mental status [R41 82]  Nausea and vomiting [R11 2]  Transaminitis [R74 0]  Age/Sex: 62 y o  male  Admission Orders:  Scheduled Medications:    Medications:  amLODIPine 10 mg Oral Daily   busPIRone 5 mg Oral BID   citalopram 40 mg Oral Daily   ferrous sulfate 325 mg Oral Daily With Breakfast   gabapentin 100 mg Oral BID   [START ON 7/5/2020] insulin glargine 10 Units Subcutaneous QAM   insulin lispro 1-5 Units Subcutaneous TID AC   insulin lispro 1-5 Units Subcutaneous HS   levETIRAcetam 750 mg Oral Q12H SHERRY   mirtazapine 15 mg Oral HS   pantoprazole 40 mg Oral BID AC   rivaroxaban 20 mg Oral Daily With Breakfast     Continuous IV Infusions:     PRN Meds:    acetaminophen 650 mg Oral Q6H PRN   calcium carbonate 1,000 mg Oral TID PRN   ondansetron 4 mg Intravenous Q6H PRN   senna 1 tablet Oral HS PRN       IP CONSULT TO NEUROLOGY  IP CONSULT TO CASE MANAGEMENT   PT/OT  SCD  seizure precaution     Network Utilization Review Department  [email or web address]  ATTENTION: Please call with any questions or concerns to [phone] and carefully listen to the prompts so that you are directed to the right person  All voicemails are confidential   Candia Siemens all requests for admission clinical reviews, approved or denied determinations and any other requests to dedicated fax number below belonging to the campus where the patient is receiving treatment   List of dedicated fax numbers for the Facilities:  [address] [place] [address] DENIALS (Administrative/Medical Necessity) [phone]   [address] (Maternity/NICU/Pediatrics) [phone]   Bebeto Yusuf [phone]   Aleida Fishman [phone]   Alon Patel [phone]   Angy Curtis [phone]   [facility] [address] [phone]   [facility]  [phone]   [address] [facility] [address]  [address] [facility] [address] [facility] [phone]

## 2020-07-04 NOTE — PLAN OF CARE
NPO for liver US at present,a te well for dinner last night though and do not anticipate any po difficulty once diet advance again, however, will continue to follow for same

## 2020-07-04 NOTE — PROGRESS NOTES
Progress Note - Gene Necessary 1965, 62 y o  male MRN: [id number]    Unit/Bed#: E5 -01 Encounter: [id number]    Primary Care Provider: Roxanne Ruano DO   Date and time admitted to hospital: 7/3/2020 10:48 AM        * Acute encephalopathy  Assessment & Plan  · Due to reported breakthrough seizure, in a patient with known seizure disorder  · Keppra level pending  · Maintain on Keppra 500 mg b i d  Prior to admission, agree with increasing dose to 750 b i d  Per Neurology  · Seizure precaution  · Plan explained to patient and daughter in detail    Transaminitis  Assessment & Plan  Chronic LFT elevation for review of [facility] record  Extensive workup done at [facility] last May 2020 which included MRI of the liver/abdomen, CT of the abdomen and pelvis and hepatitis panel  He was negative for hepatitis  Liver was unremarkable except for hemangioma    · Check CPK level, if elevated, transaminitis could be related to rhabdomyolysis from tonic-clonic seizure  · Repeat LFTs in a m  Elevated lipase  Assessment & Plan  History of pancreatitis, chronic per [facility] record  Extensive workup done during his last admission at [facility] view including MRI/and CT of the abdomen  Recheck lipase and LFTs in a m        Type 2 diabetes mellitus with hyperglycemia, without long-term current use of insulin [facility])  Assessment & Plan  Lab Results   Component Value Date    HGBA1C 9 1 (H) 05/21/2020       Recent Labs     07/03/20  1547 07/03/20  2100 07/04/20  0751 07/04/20  0905   POCGLU 178* 277* 57* 140       Blood Sugar Average: Last 72 hrs:  (P) 193   Fasting hypoglycemia this morning at 57  Decrease Lantus to 10 units  Watch for recurrent hypoglycemia    Chronic anticoagulation  Assessment & Plan  Continue anticoagulation with Xarelto for history of PE    Hyperlipidemia  Assessment & Plan  Hold statin due to elevated LFTs    GERD (gastroesophageal reflux disease)  Assessment & Plan  Continue Protonix b i d  Hypertension  Assessment & Plan  BP controlled at this time  Continue Norvasc  Losartan was held given [place] on admission      VTE Pharmacologic Prophylaxis:   Pharmacologic: Rivaroxaban (Xarelto)  Mechanical VTE Prophylaxis in Place: No    Patient Centered Rounds: I have performed bedside rounds with nursing staff today  Discussions with Specialists or Other Care Team Provider:  H&P and care everywhere records reviewed in detail    Education and Discussions with Family / Patient:  Spoke with daughter Frannie Jeffers and gave update    Time Spent for Care: 30 minutes  More than 50% of total time spent on counseling and coordination of care as described above  Current Length of Stay: 0 day(s)    Current Patient Status: Observation   Certification Statement: The patient, admitted on an observation basis, will now require > 2 midnight hospital stay due to Transaminitis, breakthrough seizure, elevated lipase    Discharge Plan:  Patient will need a 2nd midnight stay    Code Status: Level 1 - Full Code    Subjective:   Patient was concerned about his seizure  He could not recall what happened  He was just brought into the hospital after his home health aide noticed that he was having seizure-like movement  I spoke with his daughter who told me that his home health head witness the event and that she has limited English but reported that he was having shaking movement and was not responsive  He also reports occasional shaking movements while he is awake  No seizures since admission    Objective:     Vitals:   Temp (24hrs), Av 1 °F (36 7 °C), Min:97 4 °F (36 3 °C), Max:98 4 °F (36 9 °C)    Temp:  [97 4 °F (36 3 °C)-98 4 °F (36 9 °C)] 98 3 °F (36 8 °C)  HR:  [] 77  Resp:  [16-20] 18  BP: (125-172)/(60-83) 125/67  SpO2:  [99 %-100 %] 99 %  Body mass index is 21 87 kg/m²  Input and Output Summary (last 24 hours):        Intake/Output Summary (Last 24 hours) at 2020 0916  Last data filed at 7/4/2020 0901  Gross per 24 hour   Intake 3731 25 ml   Output 700 ml   Net 3031 25 ml       Physical Exam:     Physical Exam   Constitutional: He is oriented to person, place, and time  He appears well-developed  No distress  HENT:   Head: Normocephalic and atraumatic  Poor dentition   Eyes: No scleral icterus  Neck: No JVD present  Cardiovascular: Regular rhythm  Exam reveals no gallop and no friction rub  No murmur heard  Pulmonary/Chest: Effort normal  No stridor  No respiratory distress  He has no wheezes  Abdominal: Soft  He exhibits no distension  There is no tenderness  There is no guarding  Musculoskeletal: He exhibits no edema  Neurological: He is alert and oriented to person, place, and time  Skin: Skin is warm and dry  He is not diaphoretic  Psychiatric: He has a normal mood and affect  His behavior is normal    Vitals reviewed  Additional Data:     Labs:    Results from last 7 days   Lab Units 07/04/20  0449   WBC Thousand/uL 10 32*   HEMOGLOBIN g/dL 10 0*   HEMATOCRIT % 31 4*   PLATELETS Thousands/uL 166   NEUTROS PCT % 82*   LYMPHS PCT % 11*   MONOS PCT % 6   EOS PCT % 0     Results from last 7 days   Lab Units 07/04/20  0454 07/03/20  1230   SODIUM mmol/L 142 140   POTASSIUM mmol/L 3 7 3 8   CHLORIDE mmol/L 108 103   CO2 mmol/L 26 26   BUN mg/dL 17 23   CREATININE mg/dL 0 92 1 31*   ANION GAP mmol/L 8 11   CALCIUM mg/dL 8 5 8 9   ALBUMIN g/dL  --  4 2   TOTAL BILIRUBIN mg/dL  --  0 68   ALK PHOS U/L  --  129*   ALT U/L  --  309*   AST U/L  --  188*   GLUCOSE RANDOM mg/dL 63* 301*         Results from last 7 days   Lab Units 07/04/20  0905 07/04/20  0751 07/03/20  2100 07/03/20  1547 07/03/20  1219   POC GLUCOSE mg/dl 140 57* 277* 178* 313*     * I Have Reviewed All Lab Data Listed Above  * Additional Pertinent Lab Tests Reviewed:  All Labs Within Last 24 Hours Reviewed    Imaging:    Imaging Reports Reviewed Today Include:  CT head  Imaging Personally Reviewed by Myself Includes:  None    Recent Cultures (last 7 days):           Last 24 Hours Medication List:     Current Facility-Administered Medications:  acetaminophen 650 mg Oral Q6H PRN Sho Luna PA-C   amLODIPine 10 mg Oral Daily KIRA Grewal-FRITZ   busPIRone 5 mg Oral BID Sho Cheryl, PA-FRITZ   calcium carbonate 1,000 mg Oral TID PRN Jayant Adames PA-C   citalopram 40 mg Oral Daily Sho Luna PA-C   ferrous sulfate 325 mg Oral Daily With Breakfast Sho Luna PA-C   gabapentin 100 mg Oral BID Sho Luna PA-C   [START ON 7/5/2020] insulin glargine 10 Units Subcutaneous QAM Yonis Ornelas MD   insulin lispro 1-5 Units Subcutaneous TID AC Sho Luna, DANETTE   insulin lispro 1-5 Units Subcutaneous HS Sho Luna, DANETTE   levETIRAcetam 750 mg Oral Q12H [address] Deatrice Aydin,    mirtazapine 15 mg Oral HS Sho Smalfonzo, DANETTE   ondansetron 4 mg Intravenous Q6H PRN Sho Luna PA-C   pantoprazole 40 mg Oral BID AC Sho Luna PA-C   rivaroxaban 20 mg Oral Daily With Breakfast Sho Luna PA-C   senna 1 tablet Oral HS PRN Jayant Adames PA-C        Today, Patient Was Seen By: Yonis Ornelas MD    ** Please Note: Dictation voice to text software may have been used in the creation of this document   **

## 2020-07-04 NOTE — ASSESSMENT & PLAN NOTE
· Due to reported breakthrough seizure, in a patient with known seizure disorder  · Keppra level pending  · Maintain on Keppra 500 mg b i d  Prior to admission, agree with increasing dose to 750 b i d   Per Neurology  · Seizure precaution  · Plan explained to patient and daughter in detail

## 2020-07-05 VITALS
RESPIRATION RATE: 18 BRPM | SYSTOLIC BLOOD PRESSURE: 156 MMHG | OXYGEN SATURATION: 99 % | WEIGHT: 137.57 LBS | BODY MASS INDEX: 21.59 KG/M2 | TEMPERATURE: 98.1 F | HEIGHT: 67 IN | HEART RATE: 80 BPM | DIASTOLIC BLOOD PRESSURE: 84 MMHG

## 2020-07-05 LAB
ALBUMIN SERPL BCP-MCNC: 3.3 G/DL (ref 3.5–5)
ALP SERPL-CCNC: 110 U/L (ref 46–116)
ALT SERPL W P-5'-P-CCNC: 238 U/L (ref 12–78)
ANION GAP SERPL CALCULATED.3IONS-SCNC: 7 MMOL/L (ref 4–13)
AST SERPL W P-5'-P-CCNC: 118 U/L (ref 5–45)
BASOPHILS # BLD AUTO: 0.05 THOUSANDS/ΜL (ref 0–0.1)
BASOPHILS NFR BLD AUTO: 1 % (ref 0–1)
BILIRUB SERPL-MCNC: 0.49 MG/DL (ref 0.2–1)
BUN SERPL-MCNC: 18 MG/DL (ref 5–25)
CALCIUM SERPL-MCNC: 8.2 MG/DL (ref 8.3–10.1)
CHLORIDE SERPL-SCNC: 106 MMOL/L (ref 100–108)
CO2 SERPL-SCNC: 27 MMOL/L (ref 21–32)
CREAT SERPL-MCNC: 0.94 MG/DL (ref 0.6–1.3)
EOSINOPHIL # BLD AUTO: 0.09 THOUSAND/ΜL (ref 0–0.61)
EOSINOPHIL NFR BLD AUTO: 1 % (ref 0–6)
ERYTHROCYTE [DISTWIDTH] IN BLOOD BY AUTOMATED COUNT: 14.3 % (ref 11.6–15.1)
GFR SERPL CREATININE-BSD FRML MDRD: 104 ML/MIN/1.73SQ M
GLUCOSE SERPL-MCNC: 115 MG/DL (ref 65–140)
GLUCOSE SERPL-MCNC: 201 MG/DL (ref 65–140)
GLUCOSE SERPL-MCNC: 203 MG/DL (ref 65–140)
HCT VFR BLD AUTO: 31.1 % (ref 36.5–49.3)
HGB BLD-MCNC: 10.2 G/DL (ref 12–17)
IMM GRANULOCYTES # BLD AUTO: 0.01 THOUSAND/UL (ref 0–0.2)
IMM GRANULOCYTES NFR BLD AUTO: 0 % (ref 0–2)
LIPASE SERPL-CCNC: 378 U/L (ref 73–393)
LYMPHOCYTES # BLD AUTO: 1.39 THOUSANDS/ΜL (ref 0.6–4.47)
LYMPHOCYTES NFR BLD AUTO: 22 % (ref 14–44)
MCH RBC QN AUTO: 33.1 PG (ref 26.8–34.3)
MCHC RBC AUTO-ENTMCNC: 32.8 G/DL (ref 31.4–37.4)
MCV RBC AUTO: 101 FL (ref 82–98)
MONOCYTES # BLD AUTO: 0.54 THOUSAND/ΜL (ref 0.17–1.22)
MONOCYTES NFR BLD AUTO: 9 % (ref 4–12)
NEUTROPHILS # BLD AUTO: 4.26 THOUSANDS/ΜL (ref 1.85–7.62)
NEUTS SEG NFR BLD AUTO: 67 % (ref 43–75)
NRBC BLD AUTO-RTO: 0 /100 WBCS
PLATELET # BLD AUTO: 144 THOUSANDS/UL (ref 149–390)
PMV BLD AUTO: 11.1 FL (ref 8.9–12.7)
POTASSIUM SERPL-SCNC: 4 MMOL/L (ref 3.5–5.3)
PROT SERPL-MCNC: 7.3 G/DL (ref 6.4–8.2)
RBC # BLD AUTO: 3.08 MILLION/UL (ref 3.88–5.62)
SODIUM SERPL-SCNC: 140 MMOL/L (ref 136–145)
WBC # BLD AUTO: 6.34 THOUSAND/UL (ref 4.31–10.16)

## 2020-07-05 PROCEDURE — 99217 PR OBSERVATION CARE DISCHARGE MANAGEMENT: CPT | Performed by: INTERNAL MEDICINE

## 2020-07-05 PROCEDURE — 97163 PT EVAL HIGH COMPLEX 45 MIN: CPT

## 2020-07-05 PROCEDURE — 82948 REAGENT STRIP/BLOOD GLUCOSE: CPT

## 2020-07-05 PROCEDURE — 83690 ASSAY OF LIPASE: CPT | Performed by: INTERNAL MEDICINE

## 2020-07-05 PROCEDURE — 80053 COMPREHEN METABOLIC PANEL: CPT | Performed by: INTERNAL MEDICINE

## 2020-07-05 PROCEDURE — 85025 COMPLETE CBC W/AUTO DIFF WBC: CPT | Performed by: INTERNAL MEDICINE

## 2020-07-05 RX ORDER — LEVETIRACETAM 750 MG/1
750 TABLET ORAL EVERY 12 HOURS SCHEDULED
Qty: 60 TABLET | Refills: 0 | Status: SHIPPED | OUTPATIENT
Start: 2020-07-05

## 2020-07-05 RX ADMIN — CITALOPRAM HYDROBROMIDE 40 MG: 20 TABLET ORAL at 09:16

## 2020-07-05 RX ADMIN — BUSPIRONE HYDROCHLORIDE 5 MG: 5 TABLET ORAL at 09:16

## 2020-07-05 RX ADMIN — FERROUS SULFATE TAB 325 MG (65 MG ELEMENTAL FE) 325 MG: 325 (65 FE) TAB at 09:16

## 2020-07-05 RX ADMIN — RIVAROXABAN 20 MG: 20 TABLET, FILM COATED ORAL at 09:16

## 2020-07-05 RX ADMIN — GABAPENTIN 100 MG: 100 CAPSULE ORAL at 09:16

## 2020-07-05 RX ADMIN — LEVETIRACETAM 750 MG: 750 TABLET, FILM COATED ORAL at 09:16

## 2020-07-05 RX ADMIN — INSULIN GLARGINE 10 UNITS: 100 INJECTION, SOLUTION SUBCUTANEOUS at 09:16

## 2020-07-05 RX ADMIN — PANTOPRAZOLE SODIUM 40 MG: 40 TABLET, DELAYED RELEASE ORAL at 05:37

## 2020-07-05 RX ADMIN — AMLODIPINE BESYLATE 10 MG: 10 TABLET ORAL at 09:16

## 2020-07-05 NOTE — ASSESSMENT & PLAN NOTE
Chronic LFT elevation  review of [facility] record  Extensive workup done at [facility] last May 2020 which included MRI of the liver/abdomen, CT of the abdomen and pelvis and hepatitis panel    He was negative for hepatitis  Liver was unremarkable except for hemangioma    · Possibly related to breakthrough seizure with mild rhabdomyolysis  · Ultrasound without acute abnormality  · He has known hemangioma seen on previous CT and MRI  · Recent hepatitis panel done at [facility] negative  · Repeat are improving

## 2020-07-05 NOTE — ASSESSMENT & PLAN NOTE
· Due to reported breakthrough seizure, in a patient with known seizure disorder  · Keppra level pending  · Keppra dose increased to 750 mg b i d   · He is tolerating did increase in dose without any complications  · Discharge home and follow-up with his neurologist at [facility]  · [place] dot reporting form will be accomplished and submitted

## 2020-07-05 NOTE — DISCHARGE INSTR - AVS FIRST PAGE
· DO NOT DRIVE OR OPERATE MACHINERY  · Your Keppra dose was increased to 750 mg twice daily since you had another seizure while on 500 mg  · From now on, take Keppra 750 mg twice a day and follow-up with your neurologist at [facility]    Please follow-up with your family doctor this week  You will need repeat blood work to check you liver function test  Do not take Lipitor until your liver function test is done and advised by your family doctor

## 2020-07-05 NOTE — ASSESSMENT & PLAN NOTE
Lab Results   Component Value Date    HGBA1C 9 1 (H) 05/21/2020       Recent Labs     07/04/20  1552 07/04/20  2046 07/05/20  0909 07/05/20  1105   POCGLU 238* 294* 203* 201*       Blood Sugar Average: Last 72 hrs:  (P) 200 7     Blood sugars are trending   Resume home regimen

## 2020-07-05 NOTE — ASSESSMENT & PLAN NOTE
· Due to reported breakthrough seizure, in a patient with known seizure disorder  · Keppra level pending  · Keppra dose increased to 750 mg b i d   · He is tolerating did increase in dose without any complications  · Discharge home and follow-up with his neurologist at [facility]  · [place] dot reporting form will be accomplished and submitted

## 2020-07-05 NOTE — ASSESSMENT & PLAN NOTE
Chronic LFT elevation  review of [facility] record  Extensive workup done at [facility] last May 2020 which included MRI of the liver/abdomen, CT of the abdomen and pelvis and hepatitis panel    He was negative for hepatitis  Liver was unremarkable except for hemangioma    · Possibly related to breakthrough seizure with mild rhabdomyolysis  · Ultrasound without acute abnormality  · He has known hemangioma seen on previous CT and MRI  · Recent hepatitis panel done at [facility] ([place], [place]) negative  · Repeat are improving

## 2020-07-05 NOTE — DISCHARGE SUMMARY
Discharge- Yinka Baker 1965, 62 y o  male MRN: [id number]    Unit/Bed#: E5 -01 Encounter: [id number]    Primary Care Provider: Stefany Irving DO   Date and time admitted to hospital: 7/3/2020 10:48 AM        * Acute encephalopathy  Assessment & Plan  · Due to reported breakthrough seizure, in a patient with known seizure disorder  · Keppra level pending  · Keppra dose increased to 750 mg b i d   · He is tolerating did increase in dose without any complications  · Discharge home and follow-up with his neurologist at [facility]  · [place] dot reporting form will be accomplished and submitted    Transaminitis  Assessment & Plan  Chronic LFT elevation  review of [facility] record  Extensive workup done at [facility] last May 2020 which included MRI of the liver/abdomen, CT of the abdomen and pelvis and hepatitis panel  He was negative for hepatitis  Liver was unremarkable except for hemangioma    · Possibly related to breakthrough seizure with mild rhabdomyolysis  · Ultrasound without acute abnormality  · He has known hemangioma seen on previous CT and MRI  · Recent hepatitis panel done at [facility] negative  · Repeat are improving    Elevated lipase  Assessment & Plan  · Elevated on admission but without typical pain or inflammation on imaging  ·  History of pancreatitis, chronic per [facility] record  · Extensive workup done during his last admission at [facility] view including MRI/and CT of the abdomen    Repeat lipase now back to normal        Type 2 diabetes mellitus with hyperglycemia, without long-term current use of insulin [facility])  Assessment & Plan  Lab Results   Component Value Date    HGBA1C 9 1 (H) 05/21/2020       Recent Labs     07/04/20  1552 07/04/20  2046 07/05/20  0909 07/05/20  1105   POCGLU 238* 294* 203* 201*       Blood Sugar Average: Last 72 hrs:  (P) 200 7     Blood sugars are trending   Resume home regimen    Chronic anticoagulation  Assessment & Plan  Continue anticoagulation with Xarelto for history of PE    Hyperlipidemia  Assessment & Plan  Anticipate his LFTs will improve in the next few days  Hold Lipitor until repeat blood work  Patient advised to follow-up with PCP and perform repeat liver function tests    GERD (gastroesophageal reflux disease)  Assessment & Plan  Continue Pepcid on discharge    Hypertension  Assessment & Plan  Resume Norvasc and losartan on discharge (home med)      Discharging Physician / Practitioner: Kameron Jung MD  PCP: Ranjana Lutz DO  Admission Date:       Admission Orders (From admission, onward)              Ordered          07/03/20 1327   Place in Observation  Once                       Discharge Date: 07/05/20          Resolved Problems  Date Reviewed: 7/5/2020     None             Consultations During Hospital Stay:  · Neurology     Procedures Performed:   · None     Significant Findings / Test Results:   · CT head-no acute abnormality  · Liver ultrasound-no acute abnormality     Incidental Findings:   · Stable hepatic and splenic hemangioma      Test Results Pending at Discharge (will require follow up): · None     Outpatient Tests Requested:  · Repeat liver function testing in a week     Complications:  None     Reason for Admission:  Seizure     Hospital Course:      Glenis Garcia is a 62 y o  male patient who originally presented to the hospital on 7/3/2020 due to witness seizure activity at home  He has known history of seizure disorder, diabetes, chronic pancreatitis, hypertension who was sent to the hospital after his home health aide witness abnormal shaking movement with decreased responsiveness  He was admitted due to breakthrough seizure and his Keppra dose was increased from 500 to 750 mg twice a day  A Keppra level was sent and is still pending  During his brief hospital stay he had no further seizures and tolerated the increase in dose    He was evaluated by   [facility] of neurology  Pertinent findings on admission include elevated lipase of 994, elevated AST of 188,  AL K 129  He also had a mildly elevated CPK of 749  Although his lipase was elevated he had no epigastric pain or inflammation on imaging  Repeat ultrasound was performed which also showed no acute liver abnormality  He has had extensive imaging of his abdomen last May 2020 at [facility]  He also had hepatitis testing done there which was negative  Repeat lipase done on the day of discharge returned back to normal   His LFTs also continued to trend down  I suspect his elevated LFTs could be from his breakthrough seizure with mild rhabdomyolysis versus side effect of Lipitor  His Lipitor was held and will continue to be held on discharge until repeat LFTs could be done as an outpatient through his PCP  He was referred back to [facility] group  The patient has scheduled follow-up with urology for known tumor of his kidney which is currently being managed with observation  He has a scheduled follow-up with the concussion clinic on 07/31 through [address]  Please see above list of diagnoses and related plan for additional information       Condition at Discharge: good      Discharge Day Visit / Exam:      Subjective:  Slade Castro to go home  No further seizure activity  No abdominal pain      Vitals: Blood Pressure: 156/84 (07/05/20 0751)  Pulse: 80 (07/05/20 0751)  Temperature: 98 1 °F (36 7 °C) (07/05/20 0751)  Temp Source: Temporal (07/05/20 0751)  Respirations: 18 (07/05/20 0751)  Height: 5' 6 5" (168 9 cm) (07/03/20 1436)  Weight - Scale: 62 4 kg (137 lb 9 1 oz) (07/03/20 1436)  SpO2: 99 % (07/05/20 0751)  Exam:   Physical Exam   Constitutional: He is oriented to person, place, and time  He appears well-developed  No distress  HENT:   Head: Normocephalic and atraumatic  Eyes: No scleral icterus  Neck: No JVD present  Cardiovascular: Regular rhythm   Exam reveals no gallop and no friction rub  No murmur heard  Pulmonary/Chest: Effort normal  No stridor  No respiratory distress  He has no wheezes  Abdominal: Soft  He exhibits no distension  There is no tenderness  There is no guarding  Musculoskeletal: He exhibits no edema  Neurological: He is alert and oriented to person, place, and time  Skin: Skin is dry  He is not diaphoretic  Psychiatric: He has a normal mood and affect  His behavior is normal    Vitals reviewed      Discussion with Family:  Spoke with daughter Chica Jang and gave update     Discharge instructions/Information to patient and family:   See after visit summary for information provided to patient and family        Provisions for Follow-Up Care:  See after visit summary for information related to follow-up care and any pertinent home health orders        Disposition:      Home with VNA Services (Reminder: Complete face to face encounter)        Planned Readmission: no     Discharge Statement:  I spent >30 minutes discharging the patient  This time was spent on the day of discharge  I had direct contact with the patient on the day of discharge  Greater than 50% of the total time was spent examining patient, answering all patient questions, arranging and discussing plan of care with patient as well as directly providing post-discharge instructions    Additional time then spent on discharge activities      Discharge Medications:  See after visit summary for reconciled discharge medications provided to patient and family        ** Please Note: This note has been constructed using a voice recognition system **

## 2020-07-05 NOTE — SOCIAL WORK
Pt currently with HHA's in the home admitted with recurrent sz  [place] form completed and to be sent to [facility]

## 2020-07-05 NOTE — PHYSICAL THERAPY NOTE
PHYSICAL THERAPY EVALUATION          Patient Name: Daisy Castellon  UAJSB'F Date: 7/5/2020   PT EVALUATION    62 y o     [id number]    Pancreatitis [K85 90]  Altered mental status [R41 82]  Nausea and vomiting [R11 2]  Transaminitis [R74 0]    Past Medical History:   Diagnosis Date    Diabetes mellitus ([facility] [address] )     GERD (gastroesophageal reflux disease)     Hyperlipidemia     Hypertension     Migraine     Psychiatric disorder      Past Surgical History:   Procedure Laterality Date    CHOLECYSTECTOMY      COLONOSCOPY      CT CYSTOGRAM  1/28/2020    ESOPHAGOGASTRODUODENOSCOPY N/A 9/7/2016    Procedure: ESOPHAGOGASTRODUODENOSCOPY (EGD); Surgeon: Rohini Lerma MD;  Location: [facility]; Service:         07/05/20 1112   Note Type   Note type Eval only   Pain Assessment   Pain Assessment Tool Pain Assessment not indicated - pt denies pain   Pain Score No Pain   Home Living   Type of [address] One level;Elevator   [facility]; Hemiwalker   Additional Comments 0 KAREN   Prior Function   Level of [place] Independent with ADLs and functional mobility; Needs assistance with IADLs   Lives With Alone   Receives Help From Family;Home health  (A 10-2 daily)   ADL Assistance Independent   IADLs Needs assistance  (HHA assists)   Falls in the last 6 months 1 to 4  (related to blurry vision per pt)   Comments pta pt reports living alone, being indep w ADLs  use of RW > cane because he feels it provides more stability to compensate for his balance issues  -  local dtr, nieces and nephews that can assist prn   Restrictions/Precautions   Weight Bearing Precautions Per Order No   Other Precautions Seizure;Multiple lines  (PIV)   General   Additional Pertinent History pt admitted 7/3/20 for acute encephalopathy  presented to ED w AMS  hx depression, anxiety, seizures   was admitted to [facility] 5/20-31/20 for fall and associated hospital problems  up w assit orders   Cognition   Overall Cognitive Status WFL   Arousal/Participation Cooperative   Orientation Level Oriented X4   Memory Decreased recall of recent events   Following Commands Follows one step commands without difficulty   Comments pt pleasant, demonstrates good insight into deficits   RLE Assessment   RLE Assessment WFL   LLE Assessment   LLE Assessment WFL   Coordination   Movements are Fluid and Coordinated 1   Sensation WFL   Bed Mobility   Supine to Sit 5  Supervision   Additional items Bedrails; Increased time required   Sit to Supine 5  Supervision   Additional items Bedrails; Increased time required   Transfers   Sit to Stand 5  Supervision   Additional items Bedrails; Increased time required   Stand to Sit 5  Supervision   Additional items Bedrails; Increased time required   Ambulation/Elevation   Gait pattern Wide DOMI; Excessively slow   Gait Assistance 5  Supervision   Assistive Device Rolling walker   Distance 55'   Balance   Static Standing Fair   Dynamic Standing [address], & [address] -   Endurance Deficit   Endurance Deficit No   Activity Tolerance   Activity Tolerance Patient tolerated treatment well   Medical Staff Joaquín Pavel Dr Danny Blanco RN   Assessment   Prognosis Good   Problem List Impaired balance   Assessment Shadi Wolfe is a 62 y o  male admitted to [facility] on 7/3/2020 for Acute encephalopathy  Pt  has a past medical history of Diabetes mellitus ([address] ), GERD (gastroesophageal reflux disease), Hyperlipidemia, Hypertension, Migraine, and Psychiatric disorder    PT was consulted and pt was seen on 7/5/2020 for mobility assessment and d/c planning  Pt presents seizure precautions, PIV  Pt is currently functioning at a supervision assistance x1 level for bed mobility, supervision assistance x1 level for transfers, supervision assistance x1 level for ambulation with Rolling Walker   Pt demonstrated no significant deficits of strength, endurance, balance w use of RW  Pt states no concerns w d/c home  Agreeable to HHPT for strengthening, balance  At this time PT recommendations for d/c are home w support, HHPT when medically stable     Goals   Patient Goals get better   Plan   PT Frequency   (d/c PT- pt expected to d/c today)   Recommendation   PT Discharge Recommendation Return to previous environment with social support;Home with skilled therapy  (HHPT for balance, strengthening)   PT - OK to Discharge Yes   Additional Comments when medically stable   Modified [place] Scale   Modified [place] Scale 3   Barthel Index   Feeding 10   Bathing 0   Grooming Score 5   Dressing Score 10   Bladder Score 10   Bowels Score 10   Toilet Use Score 10   Transfers (Bed/Chair) Score 15   Mobility (Level Surface) Score 0  (not tested)   Stairs Score 0  (not tested)   Barthel Index Score 70   History: co - morbidities, age, coping styles (depression, anxiety), social background (lives alone), past experience (admission at [address] [place] [address] in May), fall risk, use of assistive device, assist for iadl's, cognition (good judgement/insight), multiple lines  Exam: impairments in systems including musculoskeletal (strength), neuromuscular (balance, gait, transfers, motor function), cognition  Clinical: stable  Complexity:high    Raza Palomares, PT

## 2020-07-05 NOTE — ASSESSMENT & PLAN NOTE
Lab Results   Component Value Date    HGBA1C 9 1 (H) 05/21/2020       Recent Labs     07/04/20  1106 07/04/20  1552 07/04/20 2046 07/05/20  0909   POCGLU 106 238* 294* 203*       Blood Sugar Average: Last 72 hrs:  (P) 122 [id number]     Blood sugars are trending   Resume home regimen

## 2020-07-05 NOTE — NURSING NOTE
Patient discharged to home  Discharge instructions were reviewed with patient  Patient agreed to follow up with PCP and Neurology within the next few weeks   IV was removed and patient was escorted out via wheelchair

## 2020-07-05 NOTE — PLAN OF CARE
Problem: Potential for Falls  Goal: Patient will remain free of falls  Description  INTERVENTIONS:  - Assess patient frequently for physical needs  -  Identify cognitive and physical deficits and behaviors that affect risk of falls    -  [place] fall precautions as indicated by assessment   - Educate patient/family on patient safety including physical limitations  - Instruct patient to call for assistance with activity based on assessment  - Modify environment to reduce risk of injury  - Consider OT/PT consult to assist with strengthening/mobility  Outcome: Adequate for Discharge     Problem: PAIN - ADULT  Goal: Verbalizes/displays adequate comfort level or baseline comfort level  Description  Interventions:  - Encourage patient to monitor pain and request assistance  - Assess pain using appropriate pain scale  - Administer analgesics based on type and severity of pain and evaluate response  - Implement non-pharmacological measures as appropriate and evaluate response  - Consider cultural and social influences on pain and pain management  - Notify physician/advanced practitioner if interventions unsuccessful or patient reports new pain  Outcome: Adequate for Discharge     Problem: INFECTION - ADULT  Goal: Absence or prevention of progression during hospitalization  Description  INTERVENTIONS:  - Assess and monitor for signs and symptoms of infection  - Monitor lab/diagnostic results  - Monitor all insertion sites, i e  indwelling lines, tubes, and drains  - Monitor endotracheal if appropriate and nasal secretions for changes in amount and color  - [place] appropriate cooling/warming therapies per order  - Administer medications as ordered  - Instruct and encourage patient and family to use good hand hygiene technique  - Identify and instruct in appropriate isolation precautions for identified infection/condition  Outcome: Adequate for Discharge     Problem: SAFETY ADULT  Goal: Maintain or return to baseline ADL function  Description  INTERVENTIONS:  -  Assess patient's ability to carry out ADLs; assess patient's baseline for ADL function and identify physical deficits which impact ability to perform ADLs (bathing, care of mouth/teeth, toileting, grooming, dressing, etc )  - Assess/evaluate cause of self-care deficits   - Assess range of motion  - Assess patient's mobility; develop plan if impaired  - Assess patient's need for assistive devices and provide as appropriate  - Encourage maximum independence but intervene and supervise when necessary  - Involve family in performance of ADLs  - Assess for home care needs following discharge   - Consider OT consult to assist with ADL evaluation and planning for discharge  - Provide patient education as appropriate  Outcome: Adequate for Discharge  Goal: Maintain or return mobility status to optimal level  Description  INTERVENTIONS:  - Assess patient's baseline mobility status (ambulation, transfers, stairs, etc )    - Identify cognitive and physical deficits and behaviors that affect mobility  - Identify mobility aids required to assist with transfers and/or ambulation (gait belt, sit-to-stand, lift, walker, cane, etc )  - [place] fall precautions as indicated by assessment  - Record patient progress and toleration of activity level on Mobility SBAR; progress patient to next Phase/Stage  - Instruct patient to call for assistance with activity based on assessment  - Consider rehabilitation consult to assist with strengthening/weightbearing, etc   Outcome: Adequate for Discharge     Problem: DISCHARGE PLANNING  Goal: Discharge to home or other facility with appropriate resources  Description  INTERVENTIONS:  - Identify barriers to discharge w/patient and caregiver  - Arrange for needed discharge resources and transportation as appropriate  - Identify discharge learning needs (meds, wound care, etc )  - Arrange for interpretive services to assist at discharge as needed  - Refer to Case Management Department for coordinating discharge planning if the patient needs post-hospital services based on physician/advanced practitioner order or complex needs related to functional status, cognitive ability, or social support system  Outcome: Adequate for Discharge     Problem: Knowledge Deficit  Goal: Patient/family/caregiver demonstrates understanding of disease process, treatment plan, medications, and discharge instructions  Description  Complete learning assessment and assess knowledge base    Interventions:  - Provide teaching at level of understanding  - Provide teaching via preferred learning methods  Outcome: Adequate for Discharge     Problem: NEUROSENSORY - ADULT  Goal: Achieves stable or improved neurological status  Description  INTERVENTIONS  - Monitor and report changes in neurological status  - Monitor vital signs such as temperature, blood pressure, glucose, and any other labs ordered   - Initiate measures to prevent increased intracranial pressure  - Monitor for seizure activity and implement precautions if appropriate      Outcome: Adequate for Discharge  Goal: Remains free of injury related to seizures activity  Description  INTERVENTIONS  - Maintain airway, patient safety  and administer oxygen as ordered  - Monitor patient for seizure activity, document and report duration and description of seizure to physician/advanced practitioner  - If seizure occurs,  ensure patient safety during seizure  - Reorient patient post seizure  - Seizure pads on all 4 side rails  - Instruct patient/family to notify RN of any seizure activity including if an aura is experienced  - Instruct patient/family to call for assistance with activity based on nursing assessment  - Administer anti-seizure medications if ordered    Outcome: Adequate for Discharge  Goal: Achieves maximal functionality and self care  Description  INTERVENTIONS  - Monitor swallowing and airway patency with patient fatigue and changes in neurological status  - Encourage and assist patient to increase activity and self care  - Encourage visually impaired, hearing impaired and aphasic patients to use assistive/communication devices  Outcome: Adequate for Discharge     Problem: Nutrition/Hydration-ADULT  Goal: Nutrient/Hydration intake appropriate for improving, restoring or maintaining nutritional needs  Description  Monitor and assess patient's nutrition/hydration status for malnutrition  Collaborate with interdisciplinary team and initiate plan and interventions as ordered  Monitor patient's weight and dietary intake as ordered or per policy  Utilize nutrition screening tool and intervene as necessary  Determine patient's food preferences and provide high-protein, high-caloric foods as appropriate       INTERVENTIONS:  - Monitor oral intake, urinary output, labs, and treatment plans  - Assess nutrition and hydration status and recommend course of action  - Evaluate amount of meals eaten  - Assist patient with eating if necessary   - Allow adequate time for meals  - Recommend/ encourage appropriate diets, oral nutritional supplements, and vitamin/mineral supplements  - Order, calculate, and assess calorie counts as needed  - Recommend, monitor, and adjust tube feedings and TPN/PPN based on assessed needs  - Assess need for intravenous fluids  - Provide specific nutrition/hydration education as appropriate  - Include patient/family/caregiver in decisions related to nutrition  Outcome: Adequate for Discharge

## 2020-07-05 NOTE — PLAN OF CARE
Problem: Potential for Falls  Goal: Patient will remain free of falls  Description  INTERVENTIONS:  - Assess patient frequently for physical needs  -  Identify cognitive and physical deficits and behaviors that affect risk of falls    -  [place] fall precautions as indicated by assessment   - Educate patient/family on patient safety including physical limitations  - Instruct patient to call for assistance with activity based on assessment  - Modify environment to reduce risk of injury  - Consider OT/PT consult to assist with strengthening/mobility  Outcome: Progressing     Problem: PAIN - ADULT  Goal: Verbalizes/displays adequate comfort level or baseline comfort level  Description  Interventions:  - Encourage patient to monitor pain and request assistance  - Assess pain using appropriate pain scale  - Administer analgesics based on type and severity of pain and evaluate response  - Implement non-pharmacological measures as appropriate and evaluate response  - Consider cultural and social influences on pain and pain management  - Notify physician/advanced practitioner if interventions unsuccessful or patient reports new pain  Outcome: Progressing     Problem: INFECTION - ADULT  Goal: Absence or prevention of progression during hospitalization  Description  INTERVENTIONS:  - Assess and monitor for signs and symptoms of infection  - Monitor lab/diagnostic results  - Monitor all insertion sites, i e  indwelling lines, tubes, and drains  - Monitor endotracheal if appropriate and nasal secretions for changes in amount and color  - [place] appropriate cooling/warming therapies per order  - Administer medications as ordered  - Instruct and encourage patient and family to use good hand hygiene technique  - Identify and instruct in appropriate isolation precautions for identified infection/condition  Outcome: Progressing     Problem: SAFETY ADULT  Goal: Maintain or return to baseline ADL function  Description  INTERVENTIONS:  -  Assess patient's ability to carry out ADLs; assess patient's baseline for ADL function and identify physical deficits which impact ability to perform ADLs (bathing, care of mouth/teeth, toileting, grooming, dressing, etc )  - Assess/evaluate cause of self-care deficits   - Assess range of motion  - Assess patient's mobility; develop plan if impaired  - Assess patient's need for assistive devices and provide as appropriate  - Encourage maximum independence but intervene and supervise when necessary  - Involve family in performance of ADLs  - Assess for home care needs following discharge   - Consider OT consult to assist with ADL evaluation and planning for discharge  - Provide patient education as appropriate  Outcome: Progressing  Goal: Maintain or return mobility status to optimal level  Description  INTERVENTIONS:  - Assess patient's baseline mobility status (ambulation, transfers, stairs, etc )    - Identify cognitive and physical deficits and behaviors that affect mobility  - Identify mobility aids required to assist with transfers and/or ambulation (gait belt, sit-to-stand, lift, walker, cane, etc )  - [place] fall precautions as indicated by assessment  - Record patient progress and toleration of activity level on Mobility SBAR; progress patient to next Phase/Stage  - Instruct patient to call for assistance with activity based on assessment  - Consider rehabilitation consult to assist with strengthening/weightbearing, etc   Outcome: Progressing     Problem: DISCHARGE PLANNING  Goal: Discharge to home or other facility with appropriate resources  Description  INTERVENTIONS:  - Identify barriers to discharge w/patient and caregiver  - Arrange for needed discharge resources and transportation as appropriate  - Identify discharge learning needs (meds, wound care, etc )  - Arrange for interpretive services to assist at discharge as needed  - Refer to Case Management Department for coordinating discharge planning if the patient needs post-hospital services based on physician/advanced practitioner order or complex needs related to functional status, cognitive ability, or social support system  Outcome: Progressing     Problem: Knowledge Deficit  Goal: Patient/family/caregiver demonstrates understanding of disease process, treatment plan, medications, and discharge instructions  Description  Complete learning assessment and assess knowledge base    Interventions:  - Provide teaching at level of understanding  - Provide teaching via preferred learning methods  Outcome: Progressing     Problem: NEUROSENSORY - ADULT  Goal: Achieves stable or improved neurological status  Description  INTERVENTIONS  - Monitor and report changes in neurological status  - Monitor vital signs such as temperature, blood pressure, glucose, and any other labs ordered   - Initiate measures to prevent increased intracranial pressure  - Monitor for seizure activity and implement precautions if appropriate      Outcome: Progressing  Goal: Remains free of injury related to seizures activity  Description  INTERVENTIONS  - Maintain airway, patient safety  and administer oxygen as ordered  - Monitor patient for seizure activity, document and report duration and description of seizure to physician/advanced practitioner  - If seizure occurs,  ensure patient safety during seizure  - Reorient patient post seizure  - Seizure pads on all 4 side rails  - Instruct patient/family to notify RN of any seizure activity including if an aura is experienced  - Instruct patient/family to call for assistance with activity based on nursing assessment  - Administer anti-seizure medications if ordered    Outcome: Progressing  Goal: Achieves maximal functionality and self care  Description  INTERVENTIONS  - Monitor swallowing and airway patency with patient fatigue and changes in neurological status  - Encourage and assist patient to increase activity and self care  - Encourage visually impaired, hearing impaired and aphasic patients to use assistive/communication devices  Outcome: Progressing     Problem: Nutrition/Hydration-ADULT  Goal: Nutrient/Hydration intake appropriate for improving, restoring or maintaining nutritional needs  Description  Monitor and assess patient's nutrition/hydration status for malnutrition  Collaborate with interdisciplinary team and initiate plan and interventions as ordered  Monitor patient's weight and dietary intake as ordered or per policy  Utilize nutrition screening tool and intervene as necessary  Determine patient's food preferences and provide high-protein, high-caloric foods as appropriate       INTERVENTIONS:  - Monitor oral intake, urinary output, labs, and treatment plans  - Assess nutrition and hydration status and recommend course of action  - Evaluate amount of meals eaten  - Assist patient with eating if necessary   - Allow adequate time for meals  - Recommend/ encourage appropriate diets, oral nutritional supplements, and vitamin/mineral supplements  - Order, calculate, and assess calorie counts as needed  - Recommend, monitor, and adjust tube feedings and TPN/PPN based on assessed needs  - Assess need for intravenous fluids  - Provide specific nutrition/hydration education as appropriate  - Include patient/family/caregiver in decisions related to nutrition  Outcome: Progressing

## 2020-07-05 NOTE — ASSESSMENT & PLAN NOTE
Anticipate his LFTs will improve in the next few days    Hold Lipitor until repeat blood work  Patient advised to follow-up with PCP and perform repeat liver function tests

## 2020-07-05 NOTE — PROGRESS NOTES
Progress Note - Maday Montiel 1965, 62 y o  male MRN: [id number]    Unit/Bed#: E5 -01 Encounter: [id number]    Primary Care Provider: Ari Mota DO   Date and time admitted to hospital: 7/3/2020 10:48 AM        * Acute encephalopathy  Assessment & Plan  · Due to reported breakthrough seizure, in a patient with known seizure disorder  · Keppra level pending  · Keppra dose increased to 750 mg b i d   · He is tolerating did increase in dose without any complications  · Discharge home and follow-up with his neurologist at [facility]  · Angel Luis dot reporting form will be accomplished and submitted    Transaminitis  Assessment & Plan  Chronic LFT elevation  review of [facility] record  Extensive workup done at [facility] last May 2020 which included MRI of the liver/abdomen, CT of the abdomen and pelvis and hepatitis panel  He was negative for hepatitis  Liver was unremarkable except for hemangioma    · Possibly related to breakthrough seizure with mild rhabdomyolysis  · Ultrasound without acute abnormality  · He has known hemangioma seen on previous CT and MRI  · Recent hepatitis panel done at [facility] negative  · Repeat are improving    Elevated lipase  Assessment & Plan  · Elevated on admission but without typical pain or inflammation on imaging  ·  History of pancreatitis, chronic per [facility] record  · Extensive workup done during his last admission at [facility] view including MRI/and CT of the abdomen    Repeat lipase now back to normal        Type 2 diabetes mellitus with hyperglycemia, without long-term current use of insulin [facility])  Assessment & Plan  Lab Results   Component Value Date    HGBA1C 9 1 (H) 05/21/2020       Recent Labs     07/04/20  1106 07/04/20  1552 07/04/20  2046 07/05/20  0909   POCGLU 106 238* 294* 203*       Blood Sugar Average: Last 72 hrs:  (P) 816 [id number]     Blood sugars are trending   Resume home regimen    Chronic anticoagulation  Assessment & Plan  Continue anticoagulation with Xarelto for history of PE    Hyperlipidemia  Assessment & Plan  Anticipate his LFTs will improve in the next few days  Hold Lipitor until repeat blood work  Patient advised to follow-up with PCP and perform repeat liver function tests    GERD (gastroesophageal reflux disease)  Assessment & Plan  Continue Pepcid on discharge    Hypertension  Assessment & Plan  Resume Norvasc and losartan on discharge (home med)      Discharging Physician / Practitioner: Gerson Angelo MD  PCP: Robert Phalen, DO  Admission Date:   Admission Orders (From admission, onward)     Ordered        07/03/20 1327  Place in Observation  Once                   Discharge Date: 07/05/20    Resolved Problems  Date Reviewed: 7/5/2020    None          Consultations During Hospital Stay:  · Neurology    Procedures Performed:   · None    Significant Findings / Test Results:   · CT head-no acute abnormality  · Liver ultrasound-no acute abnormality    Incidental Findings:   · Stable hepatic and splenic hemangioma     Test Results Pending at Discharge (will require follow up): · None     Outpatient Tests Requested:  · Repeat liver function testing in a week    Complications:  None    Reason for Admission:  Seizure    Hospital Course:     Lorie Cam is a 62 y o  male patient who originally presented to the hospital on 7/3/2020 due to witness seizure activity at home  He has known history of seizure disorder, diabetes, chronic pancreatitis, hypertension who was sent to the hospital after his home health aide witness abnormal shaking movement with decreased responsiveness  He was admitted due to breakthrough seizure and his Keppra dose was increased from 500 to 750 mg twice a day  A Keppra level was sent and is still pending  During his brief hospital stay he had no further seizures and tolerated the increase in dose  He was evaluated by Dr Izzy Manuel of neurology    Pertinent findings on admission include elevated lipase of 994, elevated AST of 188,  AL K 129  He also had a mildly elevated CPK of 749  Although his lipase was elevated he had no epigastric pain or inflammation on imaging  Repeat ultrasound was performed which also showed no acute liver abnormality  He has had extensive imaging of his abdomen last May 2020 at [facility]  He also had hepatitis testing done there which was negative  Repeat lipase done on the day of discharge returned back to normal   His LFTs also continued to trend down  I suspect his elevated LFTs could be from his breakthrough seizure with mild rhabdomyolysis versus side effect of Lipitor  His Lipitor was held and will continue to be held on discharge until repeat LFTs could be done as an outpatient through his PCP  He was referred back to [facility] group  The patient has scheduled follow-up with urology for known tumor of his kidney which is currently being managed with observation  He has a scheduled follow-up with the concussion clinic on 07/31 through [facility]    Please see above list of diagnoses and related plan for additional information  Condition at Discharge: good     Discharge Day Visit / Exam:     Subjective:  [employer] to go home  No further seizure activity  No abdominal pain  Vitals: Blood Pressure: 156/84 (07/05/20 0751)  Pulse: 80 (07/05/20 0751)  Temperature: 98 1 °F (36 7 °C) (07/05/20 0751)  Temp Source: Temporal (07/05/20 0751)  Respirations: 18 (07/05/20 0751)  Height: 5' 6 5" (168 9 cm) (07/03/20 1436)  Weight - Scale: 62 4 kg (137 lb 9 1 oz) (07/03/20 1436)  SpO2: 99 % (07/05/20 0751)  Exam:   Physical Exam   Constitutional: He is oriented to person, place, and time  He appears well-developed  No distress  HENT:   Head: Normocephalic and atraumatic  Eyes: No scleral icterus  Neck: No JVD present  Cardiovascular: Regular rhythm  Exam reveals no gallop and no friction rub     No murmur heard   Pulmonary/Chest: Effort normal  No stridor  No respiratory distress  He has no wheezes  Abdominal: Soft  He exhibits no distension  There is no tenderness  There is no guarding  Musculoskeletal: He exhibits no edema  Neurological: He is alert and oriented to person, place, and time  Skin: Skin is dry  He is not diaphoretic  Psychiatric: He has a normal mood and affect  His behavior is normal    Vitals reviewed  Discussion with Family:  Spoke with daughter Lynda Matute and gave update    Discharge instructions/Information to patient and family:   See after visit summary for information provided to patient and family  Provisions for Follow-Up Care:  See after visit summary for information related to follow-up care and any pertinent home health orders  Disposition:     Home with [facility] Services (Reminder: Complete face to face encounter)      Planned Readmission: no     Discharge Statement:  I spent >30 minutes discharging the patient  This time was spent on the day of discharge  I had direct contact with the patient on the day of discharge  Greater than 50% of the total time was spent examining patient, answering all patient questions, arranging and discussing plan of care with patient as well as directly providing post-discharge instructions  Additional time then spent on discharge activities  Discharge Medications:  See after visit summary for reconciled discharge medications provided to patient and family        ** Please Note: This note has been constructed using a voice recognition system **

## 2020-07-05 NOTE — ASSESSMENT & PLAN NOTE
· Elevated on admission but without typical pain or inflammation on imaging  ·  History of pancreatitis, chronic per [facility] record  · Extensive workup done during his last admission at [facility] view including MRI/and CT of the abdomen    Repeat lipase now back to normal

## 2020-07-07 LAB — LEVETIRACETAM SERPL-MCNC: 23.1 UG/ML (ref 10–40)

## 2020-10-09 ENCOUNTER — HOSPITAL ENCOUNTER (EMERGENCY)
Facility: HOSPITAL | Age: 55
Discharge: HOME | End: 2020-10-09
Attending: EMERGENCY MEDICINE | Admitting: EMERGENCY MEDICINE
Payer: COMMERCIAL

## 2020-10-09 VITALS
DIASTOLIC BLOOD PRESSURE: 74 MMHG | BODY MASS INDEX: 19.24 KG/M2 | RESPIRATION RATE: 18 BRPM | HEART RATE: 90 BPM | SYSTOLIC BLOOD PRESSURE: 162 MMHG | OXYGEN SATURATION: 100 % | TEMPERATURE: 97.3 F | WEIGHT: 121.03 LBS

## 2020-10-09 DIAGNOSIS — R19.7 DIARRHEA: Primary | ICD-10-CM

## 2020-10-09 LAB
ALBUMIN SERPL BCP-MCNC: 4.4 G/DL (ref 3.5–5)
ALP SERPL-CCNC: 138 U/L (ref 46–116)
ALT SERPL W P-5'-P-CCNC: 130 U/L (ref 12–78)
ANION GAP SERPL CALCULATED.3IONS-SCNC: 11 MMOL/L (ref 4–13)
AST SERPL W P-5'-P-CCNC: 56 U/L (ref 5–45)
BASOPHILS # BLD AUTO: 0.05 THOUSANDS/ΜL (ref 0–0.1)
BASOPHILS NFR BLD AUTO: 1 % (ref 0–1)
BILIRUB SERPL-MCNC: 0.7 MG/DL (ref 0.2–1)
BUN SERPL-MCNC: 14 MG/DL (ref 5–25)
CALCIUM SERPL-MCNC: 9.4 MG/DL (ref 8.3–10.1)
CHLORIDE SERPL-SCNC: 100 MMOL/L (ref 100–108)
CO2 SERPL-SCNC: 29 MMOL/L (ref 21–32)
CREAT SERPL-MCNC: 0.9 MG/DL (ref 0.6–1.3)
EOSINOPHIL # BLD AUTO: 0 THOUSAND/ΜL (ref 0–0.61)
EOSINOPHIL NFR BLD AUTO: 0 % (ref 0–6)
ERYTHROCYTE [DISTWIDTH] IN BLOOD BY AUTOMATED COUNT: 14 % (ref 11.6–15.1)
GFR SERPL CREATININE-BSD FRML MDRD: 110 ML/MIN/1.73SQ M
GLUCOSE SERPL-MCNC: 124 MG/DL (ref 65–140)
HAV IGM SER QL: NORMAL
HBV CORE IGM SER QL: NORMAL
HBV SURFACE AG SER QL: NORMAL
HCT VFR BLD AUTO: 37.5 % (ref 36.5–49.3)
HCV AB SER QL: NORMAL
HGB BLD-MCNC: 12.7 G/DL (ref 12–17)
IMM GRANULOCYTES # BLD AUTO: 0.03 THOUSAND/UL (ref 0–0.2)
IMM GRANULOCYTES NFR BLD AUTO: 0 % (ref 0–2)
LIPASE SERPL-CCNC: 403 U/L (ref 73–393)
LYMPHOCYTES # BLD AUTO: 0.97 THOUSANDS/ΜL (ref 0.6–4.47)
LYMPHOCYTES NFR BLD AUTO: 12 % (ref 14–44)
MCH RBC QN AUTO: 32.8 PG (ref 26.8–34.3)
MCHC RBC AUTO-ENTMCNC: 33.9 G/DL (ref 31.4–37.4)
MCV RBC AUTO: 97 FL (ref 82–98)
MONOCYTES # BLD AUTO: 0.54 THOUSAND/ΜL (ref 0.17–1.22)
MONOCYTES NFR BLD AUTO: 7 % (ref 4–12)
NEUTROPHILS # BLD AUTO: 6.45 THOUSANDS/ΜL (ref 1.85–7.62)
NEUTS SEG NFR BLD AUTO: 80 % (ref 43–75)
NRBC BLD AUTO-RTO: 0 /100 WBCS
PLATELET # BLD AUTO: 183 THOUSANDS/UL (ref 149–390)
PMV BLD AUTO: 12.2 FL (ref 8.9–12.7)
POTASSIUM SERPL-SCNC: 3.5 MMOL/L (ref 3.5–5.3)
PROT SERPL-MCNC: 9 G/DL (ref 6.4–8.2)
RBC # BLD AUTO: 3.87 MILLION/UL (ref 3.88–5.62)
SODIUM SERPL-SCNC: 140 MMOL/L (ref 136–145)
TSH SERPL DL<=0.05 MIU/L-ACNC: 2.83 UIU/ML (ref 0.36–3.74)
WBC # BLD AUTO: 8.04 THOUSAND/UL (ref 4.31–10.16)

## 2020-10-09 PROCEDURE — 82705 FATS/LIPIDS FECES QUAL: CPT | Performed by: EMERGENCY MEDICINE

## 2020-10-09 PROCEDURE — 36415 COLL VENOUS BLD VENIPUNCTURE: CPT | Performed by: EMERGENCY MEDICINE

## 2020-10-09 PROCEDURE — 83690 ASSAY OF LIPASE: CPT | Performed by: EMERGENCY MEDICINE

## 2020-10-09 PROCEDURE — 80053 COMPREHEN METABOLIC PANEL: CPT | Performed by: EMERGENCY MEDICINE

## 2020-10-09 PROCEDURE — 84443 ASSAY THYROID STIM HORMONE: CPT | Performed by: EMERGENCY MEDICINE

## 2020-10-09 PROCEDURE — 99284 EMERGENCY DEPT VISIT MOD MDM: CPT | Performed by: EMERGENCY MEDICINE

## 2020-10-09 PROCEDURE — 87493 C DIFF AMPLIFIED PROBE: CPT | Performed by: EMERGENCY MEDICINE

## 2020-10-09 PROCEDURE — 96374 THER/PROPH/DIAG INJ IV PUSH: CPT

## 2020-10-09 PROCEDURE — 80074 ACUTE HEPATITIS PANEL: CPT | Performed by: EMERGENCY MEDICINE

## 2020-10-09 PROCEDURE — 87046 STOOL CULTR AEROBIC BACT EA: CPT

## 2020-10-09 PROCEDURE — 96375 TX/PRO/DX INJ NEW DRUG ADDON: CPT

## 2020-10-09 PROCEDURE — 89055 LEUKOCYTE ASSESSMENT FECAL: CPT | Performed by: EMERGENCY MEDICINE

## 2020-10-09 PROCEDURE — 85025 COMPLETE CBC W/AUTO DIFF WBC: CPT | Performed by: EMERGENCY MEDICINE

## 2020-10-09 PROCEDURE — 96361 HYDRATE IV INFUSION ADD-ON: CPT

## 2020-10-09 PROCEDURE — 87427 SHIGA-LIKE TOXIN AG IA: CPT

## 2020-10-09 PROCEDURE — 87045 FECES CULTURE AEROBIC BACT: CPT | Performed by: EMERGENCY MEDICINE

## 2020-10-09 PROCEDURE — 99284 EMERGENCY DEPT VISIT MOD MDM: CPT

## 2020-10-09 RX ORDER — METOCLOPRAMIDE 10 MG/1
10 TABLET ORAL EVERY 6 HOURS
Qty: 30 TABLET | Refills: 0 | Status: SHIPPED | OUTPATIENT
Start: 2020-10-09

## 2020-10-09 RX ORDER — DIPHENHYDRAMINE HYDROCHLORIDE 50 MG/ML
25 INJECTION INTRAMUSCULAR; INTRAVENOUS ONCE
Status: COMPLETED | OUTPATIENT
Start: 2020-10-09 | End: 2020-10-09

## 2020-10-09 RX ORDER — METOCLOPRAMIDE HYDROCHLORIDE 5 MG/ML
10 INJECTION INTRAMUSCULAR; INTRAVENOUS ONCE
Status: COMPLETED | OUTPATIENT
Start: 2020-10-09 | End: 2020-10-09

## 2020-10-09 RX ADMIN — SODIUM CHLORIDE 1000 ML: 0.9 INJECTION, SOLUTION INTRAVENOUS at 11:17

## 2020-10-09 RX ADMIN — METOCLOPRAMIDE 10 MG: 5 INJECTION, SOLUTION INTRAMUSCULAR; INTRAVENOUS at 11:19

## 2020-10-09 RX ADMIN — DIPHENHYDRAMINE HYDROCHLORIDE 25 MG: 50 INJECTION, SOLUTION INTRAMUSCULAR; INTRAVENOUS at 11:19

## 2020-10-10 LAB — C DIFF TOX B TCDB STL QL NAA+PROBE: NEGATIVE

## 2020-10-12 LAB
FAT STL QL: NORMAL
NEUTRAL FAT STL QL: NORMAL
WBC SPEC QL GRAM STN: NORMAL

## 2020-10-16 LAB — MISCELLANEOUS LAB TEST RESULT: NORMAL

## 2020-10-22 ENCOUNTER — HOSPITAL ENCOUNTER (EMERGENCY)
Facility: HOSPITAL | Age: 55
Discharge: HOME | End: 2020-10-22
Attending: EMERGENCY MEDICINE | Admitting: EMERGENCY MEDICINE
Payer: COMMERCIAL

## 2020-10-22 VITALS
BODY MASS INDEX: 18.16 KG/M2 | SYSTOLIC BLOOD PRESSURE: 166 MMHG | DIASTOLIC BLOOD PRESSURE: 76 MMHG | RESPIRATION RATE: 16 BRPM | TEMPERATURE: 97.8 F | OXYGEN SATURATION: 99 % | WEIGHT: 114.2 LBS | HEART RATE: 88 BPM

## 2020-10-22 DIAGNOSIS — R11.2 NON-INTRACTABLE VOMITING WITH NAUSEA, UNSPECIFIED VOMITING TYPE: Primary | ICD-10-CM

## 2020-10-22 LAB
ALBUMIN SERPL BCP-MCNC: 4.3 G/DL (ref 3.5–5)
ALP SERPL-CCNC: 105 U/L (ref 46–116)
ALT SERPL W P-5'-P-CCNC: 164 U/L (ref 12–78)
ANION GAP SERPL CALCULATED.3IONS-SCNC: 11 MMOL/L (ref 4–13)
AST SERPL W P-5'-P-CCNC: 108 U/L (ref 5–45)
BASOPHILS # BLD AUTO: 0.06 THOUSANDS/ΜL (ref 0–0.1)
BASOPHILS NFR BLD AUTO: 1 % (ref 0–1)
BILIRUB SERPL-MCNC: 0.57 MG/DL (ref 0.2–1)
BUN SERPL-MCNC: 20 MG/DL (ref 5–25)
CALCIUM SERPL-MCNC: 10 MG/DL (ref 8.3–10.1)
CHLORIDE SERPL-SCNC: 105 MMOL/L (ref 100–108)
CO2 SERPL-SCNC: 28 MMOL/L (ref 21–32)
CREAT SERPL-MCNC: 1.33 MG/DL (ref 0.6–1.3)
EOSINOPHIL # BLD AUTO: 0.07 THOUSAND/ΜL (ref 0–0.61)
EOSINOPHIL NFR BLD AUTO: 1 % (ref 0–6)
ERYTHROCYTE [DISTWIDTH] IN BLOOD BY AUTOMATED COUNT: 14.8 % (ref 11.6–15.1)
GFR SERPL CREATININE-BSD FRML MDRD: 69 ML/MIN/1.73SQ M
GLUCOSE SERPL-MCNC: 75 MG/DL (ref 65–140)
GLUCOSE SERPL-MCNC: 96 MG/DL (ref 65–140)
HCT VFR BLD AUTO: 36.8 % (ref 36.5–49.3)
HGB BLD-MCNC: 12 G/DL (ref 12–17)
IMM GRANULOCYTES # BLD AUTO: 0.02 THOUSAND/UL (ref 0–0.2)
IMM GRANULOCYTES NFR BLD AUTO: 0 % (ref 0–2)
LIPASE SERPL-CCNC: 296 U/L (ref 73–393)
LYMPHOCYTES # BLD AUTO: 1.58 THOUSANDS/ΜL (ref 0.6–4.47)
LYMPHOCYTES NFR BLD AUTO: 24 % (ref 14–44)
MCH RBC QN AUTO: 32.1 PG (ref 26.8–34.3)
MCHC RBC AUTO-ENTMCNC: 32.6 G/DL (ref 31.4–37.4)
MCV RBC AUTO: 98 FL (ref 82–98)
MONOCYTES # BLD AUTO: 0.49 THOUSAND/ΜL (ref 0.17–1.22)
MONOCYTES NFR BLD AUTO: 8 % (ref 4–12)
NEUTROPHILS # BLD AUTO: 4.27 THOUSANDS/ΜL (ref 1.85–7.62)
NEUTS SEG NFR BLD AUTO: 66 % (ref 43–75)
NRBC BLD AUTO-RTO: 0 /100 WBCS
PLATELET # BLD AUTO: 219 THOUSANDS/UL (ref 149–390)
PMV BLD AUTO: 11.4 FL (ref 8.9–12.7)
POTASSIUM SERPL-SCNC: 3.8 MMOL/L (ref 3.5–5.3)
PROT SERPL-MCNC: 8.7 G/DL (ref 6.4–8.2)
RBC # BLD AUTO: 3.74 MILLION/UL (ref 3.88–5.62)
SODIUM SERPL-SCNC: 144 MMOL/L (ref 136–145)
WBC # BLD AUTO: 6.49 THOUSAND/UL (ref 4.31–10.16)

## 2020-10-22 PROCEDURE — 99284 EMERGENCY DEPT VISIT MOD MDM: CPT | Performed by: EMERGENCY MEDICINE

## 2020-10-22 PROCEDURE — 83690 ASSAY OF LIPASE: CPT | Performed by: EMERGENCY MEDICINE

## 2020-10-22 PROCEDURE — 82948 REAGENT STRIP/BLOOD GLUCOSE: CPT

## 2020-10-22 PROCEDURE — 99283 EMERGENCY DEPT VISIT LOW MDM: CPT

## 2020-10-22 PROCEDURE — 96374 THER/PROPH/DIAG INJ IV PUSH: CPT

## 2020-10-22 PROCEDURE — 80053 COMPREHEN METABOLIC PANEL: CPT | Performed by: EMERGENCY MEDICINE

## 2020-10-22 PROCEDURE — 96361 HYDRATE IV INFUSION ADD-ON: CPT

## 2020-10-22 PROCEDURE — 96375 TX/PRO/DX INJ NEW DRUG ADDON: CPT

## 2020-10-22 PROCEDURE — 85025 COMPLETE CBC W/AUTO DIFF WBC: CPT | Performed by: EMERGENCY MEDICINE

## 2020-10-22 PROCEDURE — 36415 COLL VENOUS BLD VENIPUNCTURE: CPT | Performed by: EMERGENCY MEDICINE

## 2020-10-22 RX ORDER — ONDANSETRON 2 MG/ML
4 INJECTION INTRAMUSCULAR; INTRAVENOUS ONCE
Status: COMPLETED | OUTPATIENT
Start: 2020-10-22 | End: 2020-10-22

## 2020-10-22 RX ORDER — PANTOPRAZOLE SODIUM 40 MG/1
40 TABLET, DELAYED RELEASE ORAL ONCE
Status: COMPLETED | OUTPATIENT
Start: 2020-10-22 | End: 2020-10-22

## 2020-10-22 RX ORDER — PROMETHAZINE HYDROCHLORIDE 25 MG/ML
12.5 INJECTION, SOLUTION INTRAMUSCULAR; INTRAVENOUS ONCE
Status: COMPLETED | OUTPATIENT
Start: 2020-10-22 | End: 2020-10-22

## 2020-10-22 RX ORDER — METOCLOPRAMIDE HYDROCHLORIDE 5 MG/ML
10 INJECTION INTRAMUSCULAR; INTRAVENOUS ONCE
Status: COMPLETED | OUTPATIENT
Start: 2020-10-22 | End: 2020-10-22

## 2020-10-22 RX ORDER — PANTOPRAZOLE SODIUM 20 MG/1
20 TABLET, DELAYED RELEASE ORAL
Status: DISCONTINUED | OUTPATIENT
Start: 2020-10-22 | End: 2020-10-22

## 2020-10-22 RX ADMIN — PROMETHAZINE HYDROCHLORIDE 12.5 MG: 25 INJECTION INTRAMUSCULAR; INTRAVENOUS at 11:06

## 2020-10-22 RX ADMIN — ONDANSETRON 4 MG: 2 INJECTION INTRAMUSCULAR; INTRAVENOUS at 10:16

## 2020-10-22 RX ADMIN — SODIUM CHLORIDE 1000 ML: 0.9 INJECTION, SOLUTION INTRAVENOUS at 09:43

## 2020-10-22 RX ADMIN — METOCLOPRAMIDE 10 MG: 5 INJECTION, SOLUTION INTRAMUSCULAR; INTRAVENOUS at 09:44

## 2020-10-22 RX ADMIN — PANTOPRAZOLE SODIUM 40 MG: 40 TABLET, DELAYED RELEASE ORAL at 12:13

## 2020-10-22 RX ADMIN — DEXTROSE AND SODIUM CHLORIDE: 5; .9 INJECTION, SOLUTION INTRAVENOUS at 11:15

## 2020-10-22 RX ADMIN — DEXTROSE AND SODIUM CHLORIDE 1000 ML: 5; .9 INJECTION, SOLUTION INTRAVENOUS at 12:45

## 2020-10-23 ENCOUNTER — LAB (OUTPATIENT)
Dept: LAB | Facility: HOSPITAL | Age: 55
End: 2020-10-23
Attending: INTERNAL MEDICINE
Payer: COMMERCIAL

## 2020-10-23 DIAGNOSIS — R19.7 DIARRHEA, UNSPECIFIED TYPE: ICD-10-CM

## 2020-10-23 LAB
T4 FREE SERPL-MCNC: 1.17 NG/DL (ref 0.76–1.46)
TSH SERPL DL<=0.05 MIU/L-ACNC: 4.99 UIU/ML (ref 0.36–3.74)

## 2020-10-23 PROCEDURE — 84443 ASSAY THYROID STIM HORMONE: CPT

## 2020-10-23 PROCEDURE — 36415 COLL VENOUS BLD VENIPUNCTURE: CPT

## 2020-10-23 PROCEDURE — 84439 ASSAY OF FREE THYROXINE: CPT

## 2021-02-18 ENCOUNTER — ANESTHESIA EVENT (OUTPATIENT)
Dept: GASTROENTEROLOGY | Facility: HOSPITAL | Age: 56
End: 2021-02-18

## 2021-02-19 ENCOUNTER — ANESTHESIA (OUTPATIENT)
Dept: GASTROENTEROLOGY | Facility: HOSPITAL | Age: 56
End: 2021-02-19

## 2021-02-19 ENCOUNTER — HOSPITAL ENCOUNTER (OUTPATIENT)
Dept: GASTROENTEROLOGY | Facility: HOSPITAL | Age: 56
Setting detail: OUTPATIENT SURGERY
Discharge: HOME | End: 2021-02-19
Attending: INTERNAL MEDICINE | Admitting: INTERNAL MEDICINE
Payer: COMMERCIAL

## 2021-02-19 VITALS
BODY MASS INDEX: 18.83 KG/M2 | DIASTOLIC BLOOD PRESSURE: 72 MMHG | TEMPERATURE: 98.4 F | OXYGEN SATURATION: 100 % | HEART RATE: 70 BPM | WEIGHT: 120 LBS | HEIGHT: 67 IN | RESPIRATION RATE: 20 BRPM | SYSTOLIC BLOOD PRESSURE: 160 MMHG

## 2021-02-19 VITALS — HEART RATE: 68 BPM

## 2021-02-19 DIAGNOSIS — Z79.4 TYPE 2 DIABETES MELLITUS WITHOUT COMPLICATION, WITH LONG-TERM CURRENT USE OF INSULIN (HCC): ICD-10-CM

## 2021-02-19 DIAGNOSIS — R11.11 VOMITING WITHOUT NAUSEA, INTRACTABILITY OF VOMITING NOT SPECIFIED, UNSPECIFIED VOMITING TYPE: ICD-10-CM

## 2021-02-19 DIAGNOSIS — R14.0 BLOATING: ICD-10-CM

## 2021-02-19 DIAGNOSIS — E11.9 TYPE 2 DIABETES MELLITUS WITHOUT COMPLICATION, WITH LONG-TERM CURRENT USE OF INSULIN (HCC): ICD-10-CM

## 2021-02-19 DIAGNOSIS — E11.65 TYPE 2 DIABETES MELLITUS WITH HYPERGLYCEMIA, WITHOUT LONG-TERM CURRENT USE OF INSULIN (HCC): ICD-10-CM

## 2021-02-19 DIAGNOSIS — K21.9 GASTROESOPHAGEAL REFLUX DISEASE WITHOUT ESOPHAGITIS: ICD-10-CM

## 2021-02-19 LAB
GLUCOSE SERPL-MCNC: 213 MG/DL (ref 65–140)
GLUCOSE SERPL-MCNC: 247 MG/DL (ref 65–140)

## 2021-02-19 PROCEDURE — 43247 EGD REMOVE FOREIGN BODY: CPT | Performed by: INTERNAL MEDICINE

## 2021-02-19 PROCEDURE — 82948 REAGENT STRIP/BLOOD GLUCOSE: CPT

## 2021-02-19 RX ORDER — PROPOFOL 10 MG/ML
INJECTION, EMULSION INTRAVENOUS AS NEEDED
Status: DISCONTINUED | OUTPATIENT
Start: 2021-02-19 | End: 2021-02-19

## 2021-02-19 RX ORDER — ONDANSETRON 2 MG/ML
4 INJECTION INTRAMUSCULAR; INTRAVENOUS EVERY 6 HOURS PRN
Status: DISCONTINUED | OUTPATIENT
Start: 2021-02-19 | End: 2021-02-23 | Stop reason: HOSPADM

## 2021-02-19 RX ORDER — LIDOCAINE HYDROCHLORIDE 20 MG/ML
INJECTION, SOLUTION EPIDURAL; INFILTRATION; INTRACAUDAL; PERINEURAL AS NEEDED
Status: DISCONTINUED | OUTPATIENT
Start: 2021-02-19 | End: 2021-02-19

## 2021-02-19 RX ORDER — SODIUM CHLORIDE 9 MG/ML
125 INJECTION, SOLUTION INTRAVENOUS CONTINUOUS
Status: DISCONTINUED | OUTPATIENT
Start: 2021-02-19 | End: 2021-02-23 | Stop reason: HOSPADM

## 2021-02-19 RX ADMIN — PROPOFOL 120 MG: 10 INJECTION, EMULSION INTRAVENOUS at 08:36

## 2021-02-19 RX ADMIN — SODIUM CHLORIDE 125 ML/HR: 0.9 INJECTION, SOLUTION INTRAVENOUS at 08:21

## 2021-02-19 RX ADMIN — LIDOCAINE HYDROCHLORIDE 50 MG: 20 INJECTION, SOLUTION EPIDURAL; INFILTRATION; INTRACAUDAL; PERINEURAL at 08:36

## 2021-02-19 RX ADMIN — PROPOFOL 40 MG: 10 INJECTION, EMULSION INTRAVENOUS at 08:38

## 2021-02-19 NOTE — DISCHARGE INSTRUCTIONS
Please call [phone] with any problems  Unfortunately there was a large amount of food remaining in her stomach and the procedure was incomplete  Please keep your appointment next week with Dr Estefany Seth to address these issues  Upper Endoscopy   WHAT YOU NEED TO KNOW:   An upper endoscopy is also called an upper gastrointestinal (GI) endoscopy, or an esophagogastroduodenoscopy (EGD)  You may feel bloated, gassy, or have some abdominal discomfort after your procedure  Your throat may be sore for 24 to 36 hours  You may burp or pass gas from air that is still inside your body  DISCHARGE INSTRUCTIONS:   Call [phone] if:   · You have sudden chest pain or trouble breathing  Seek care immediately if:   · You feel dizzy or faint  · You have trouble swallowing  · You have severe throat pain  · Your bowel movements are very dark or black  · Your abdomen is hard and firm and you have severe pain  · You vomit blood  Contact your healthcare provider if:   · You feel full or bloated and cannot burp or pass gas  · You have not had a bowel movement for 3 days after your procedure  · You have neck pain  · You have a fever or chills  · You have nausea or are vomiting  · You have a rash or hives  · You have questions or concerns about your endoscopy  Relieve a sore throat:  Suck on throat lozenges or crushed ice  Gargle with a small amount of warm salt water  Mix 1 teaspoon of salt and 1 cup of warm water to make salt water  Relieve gas and discomfort from bloating:  Lie on your right side with a heating pad on your abdomen  Take short walks to help pass gas  Eat small meals until bloating is relieved  Rest after your procedure:  Do not drive or make important decisions until the day after your procedure  Return to your normal activity as directed  You can usually return to work the day after your procedure    Follow up with your healthcare provider as directed:  Write down your questions so you remember to ask them during your visits  © Copyright [address] Information is for End User's use only and may not be sold, redistributed or otherwise used for commercial purposes  All illustrations and images included in CareNotes® are the copyrighted property of [employer] M [employer]  or Brittney Melo  The above information is an  only  It is not intended as medical advice for individual conditions or treatments  Talk to your doctor, nurse or pharmacist before following any medical regimen to see if it is safe and effective for you

## 2021-02-19 NOTE — ANESTHESIA PREPROCEDURE EVALUATION
Procedure:  EGD    Relevant Problems   ANESTHESIA (within normal limits)      CARDIO   (+) Accelerated hypertension   (+) Hyperlipidemia   (+) Hypertension   (+) Other hyperlipidemia      ENDO   (+) Type 2 diabetes mellitus with hyperglycemia, without long-term current use of insulin (HCC)      GI/HEPATIC   (+) GERD (gastroesophageal reflux disease)      /RENAL   (+) Acute renal failure (HCC)      HEMATOLOGY   (+) Anemia      NEURO/PSYCH   (+) 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 2 diabetes mellitus (HCC)        Physical Exam    Airway    Mallampati score: II  TM Distance: >3 FB  Neck ROM: full     Dental       Cardiovascular  Rhythm: regular, Rate: normal,     Pulmonary  Breath sounds clear to auscultation,     Other Findings        Anesthesia Plan  ASA Score- 3     Anesthesia Type- IV sedation with anesthesia with ASA Monitors  Additional Monitors:   Airway Plan:           Plan Factors-Exercise tolerance (METS): <4 METS  Chart reviewed  Patient summary reviewed  Patient is not a current smoker  Induction- intravenous  Postoperative Plan-     Informed Consent- Anesthetic plan and risks discussed with patient  I personally reviewed this patient with the CRNA  Discussed and agreed on the Anesthesia Plan with the CRNA  Andrei Hayden

## 2021-04-01 ENCOUNTER — ANESTHESIA EVENT (OUTPATIENT)
Dept: GASTROENTEROLOGY | Facility: HOSPITAL | Age: 56
End: 2021-04-01

## 2021-04-02 ENCOUNTER — ANESTHESIA (OUTPATIENT)
Dept: GASTROENTEROLOGY | Facility: HOSPITAL | Age: 56
End: 2021-04-02

## 2021-04-02 ENCOUNTER — HOSPITAL ENCOUNTER (OUTPATIENT)
Dept: GASTROENTEROLOGY | Facility: HOSPITAL | Age: 56
Setting detail: OUTPATIENT SURGERY
Discharge: HOME | End: 2021-04-02
Attending: INTERNAL MEDICINE | Admitting: INTERNAL MEDICINE
Payer: COMMERCIAL

## 2021-04-02 VITALS
RESPIRATION RATE: 16 BRPM | OXYGEN SATURATION: 100 % | TEMPERATURE: 97.9 F | DIASTOLIC BLOOD PRESSURE: 86 MMHG | WEIGHT: 120 LBS | SYSTOLIC BLOOD PRESSURE: 164 MMHG | BODY MASS INDEX: 18.83 KG/M2 | HEIGHT: 67 IN | HEART RATE: 74 BPM

## 2021-04-02 DIAGNOSIS — Z79.4 TYPE 2 DIABETES MELLITUS WITHOUT COMPLICATION, WITH LONG-TERM CURRENT USE OF INSULIN (HCC): ICD-10-CM

## 2021-04-02 DIAGNOSIS — E11.65 TYPE 2 DIABETES MELLITUS WITH HYPERGLYCEMIA, WITHOUT LONG-TERM CURRENT USE OF INSULIN (HCC): ICD-10-CM

## 2021-04-02 DIAGNOSIS — R11.11 VOMITING WITHOUT NAUSEA, INTRACTABILITY OF VOMITING NOT SPECIFIED, UNSPECIFIED VOMITING TYPE: ICD-10-CM

## 2021-04-02 DIAGNOSIS — E11.9 TYPE 2 DIABETES MELLITUS WITHOUT COMPLICATION, WITH LONG-TERM CURRENT USE OF INSULIN (HCC): ICD-10-CM

## 2021-04-02 DIAGNOSIS — K21.9 GASTROESOPHAGEAL REFLUX DISEASE WITHOUT ESOPHAGITIS: ICD-10-CM

## 2021-04-02 PROBLEM — K31.84 GASTROPARESIS: Status: ACTIVE | Noted: 2021-04-02

## 2021-04-02 LAB — GLUCOSE SERPL-MCNC: 84 MG/DL (ref 65–140)

## 2021-04-02 PROCEDURE — 82948 REAGENT STRIP/BLOOD GLUCOSE: CPT

## 2021-04-02 PROCEDURE — 88305 TISSUE EXAM BY PATHOLOGIST: CPT | Performed by: PATHOLOGY

## 2021-04-02 PROCEDURE — 43239 EGD BIOPSY SINGLE/MULTIPLE: CPT | Performed by: INTERNAL MEDICINE

## 2021-04-02 RX ORDER — SODIUM CHLORIDE 9 MG/ML
125 INJECTION, SOLUTION INTRAVENOUS CONTINUOUS
Status: DISCONTINUED | OUTPATIENT
Start: 2021-04-02 | End: 2021-04-06 | Stop reason: HOSPADM

## 2021-04-02 RX ORDER — PROPOFOL 10 MG/ML
INJECTION, EMULSION INTRAVENOUS AS NEEDED
Status: DISCONTINUED | OUTPATIENT
Start: 2021-04-02 | End: 2021-04-02

## 2021-04-02 RX ADMIN — SODIUM CHLORIDE 125 ML/HR: 0.9 INJECTION, SOLUTION INTRAVENOUS at 07:18

## 2021-04-02 RX ADMIN — PROPOFOL 100 MG: 10 INJECTION, EMULSION INTRAVENOUS at 07:33

## 2021-04-02 NOTE — H&P
See attached H and P  Patient now states he was not truthful about his last meal before his last endoscopy  He states he did have macaroni and cheese last night  Will proceed with EGD and discussed with Anesthesiology

## 2021-04-02 NOTE — DISCHARGE INSTRUCTIONS
Please call [phone] with any problems  He still had food in your stomach and he did not stop your Xarelto  My office will call to arrange follow-up office visit  Upper Endoscopy   WHAT YOU NEED TO KNOW:   An upper endoscopy is also called an upper gastrointestinal (GI) endoscopy, or an esophagogastroduodenoscopy (EGD)  You may feel bloated, gassy, or have some abdominal discomfort after your procedure  Your throat may be sore for 24 to 36 hours  You may burp or pass gas from air that is still inside your body  DISCHARGE INSTRUCTIONS:   Call 911 if:   · You have sudden chest pain or trouble breathing  Seek care immediately if:   · You feel dizzy or faint  · You have trouble swallowing  · You have severe throat pain  · Your bowel movements are very dark or black  · Your abdomen is hard and firm and you have severe pain  · You vomit blood  Contact your healthcare provider if:   · You feel full or bloated and cannot burp or pass gas  · You have not had a bowel movement for 3 days after your procedure  · You have neck pain  · You have a fever or chills  · You have nausea or are vomiting  · You have a rash or hives  · You have questions or concerns about your endoscopy  Relieve a sore throat:  Suck on throat lozenges or crushed ice  Gargle with a small amount of warm salt water  Mix 1 teaspoon of salt and 1 cup of warm water to make salt water  Relieve gas and discomfort from bloating:  Lie on your right side with a heating pad on your abdomen  Take short walks to help pass gas  Eat small meals until bloating is relieved  Rest after your procedure:  Do not drive or make important decisions until the day after your procedure  Return to your normal activity as directed  You can usually return to work the day after your procedure  Follow up with your healthcare provider as directed:  Write down your questions so you remember to ask them during your visits     © Copyright [address] Information is for [employer] use only and may not be sold, redistributed or otherwise used for commercial purposes  All illustrations and images included in CareNotes® are the copyrighted property of [employer]  or Brittney Melo  The above information is an  only  It is not intended as medical advice for individual conditions or treatments  Talk to your doctor, nurse or pharmacist before following any medical regimen to see if it is safe and effective for you

## 2021-04-02 NOTE — ANESTHESIA PREPROCEDURE EVALUATION
Procedure:  EGD    Relevant Problems   ANESTHESIA (within normal limits)      CARDIO   (+) Accelerated hypertension   (+) Hyperlipidemia   (+) Hypertension   (+) Other hyperlipidemia      ENDO   (+) Type 2 diabetes mellitus with hyperglycemia, without long-term current use of insulin (HCC)      GI/HEPATIC   (+) GERD (gastroesophageal reflux disease)      /RENAL   (+) Acute renal failure (HCC)      HEMATOLOGY   (+) Anemia      NEURO/PSYCH   (+) 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 2 diabetes mellitus (HCC)        Physical Exam    Airway    Mallampati score: II  TM Distance: >3 FB  Neck ROM: full     Dental   Comment: Missing many teeth  ,     Cardiovascular  Rhythm: regular, Rate: normal,     Pulmonary  Breath sounds clear to auscultation,     Other Findings        Anesthesia Plan  ASA Score- 3     Anesthesia Type- IV sedation with anesthesia with ASA Monitors  Additional Monitors:   Airway Plan:           Plan Factors-Exercise tolerance (METS): <4 METS  Chart reviewed  Patient summary reviewed  Patient is not a current smoker  Patient instructed to abstain from smoking on day of procedure  Patient did not smoke on day of surgery  Induction- intravenous  Postoperative Plan-     Informed Consent- Anesthetic plan and risks discussed with patient and daughter Wendy Stockton)  I personally reviewed this patient with the CRNA  Discussed and agreed on the Anesthesia Plan with the DEMETRIO Carrasquillo

## 2021-07-22 ENCOUNTER — HOSPITAL ENCOUNTER (EMERGENCY)
Facility: HOSPITAL | Age: 56
Discharge: HOME | End: 2021-07-22
Attending: EMERGENCY MEDICINE | Admitting: EMERGENCY MEDICINE
Payer: COMMERCIAL

## 2021-07-22 VITALS
TEMPERATURE: 98.3 F | RESPIRATION RATE: 16 BRPM | HEART RATE: 92 BPM | SYSTOLIC BLOOD PRESSURE: 183 MMHG | DIASTOLIC BLOOD PRESSURE: 90 MMHG | OXYGEN SATURATION: 100 %

## 2021-07-22 DIAGNOSIS — E86.9 VOLUME DEPLETION: ICD-10-CM

## 2021-07-22 DIAGNOSIS — E11.65 HYPERGLYCEMIA DUE TO TYPE 2 DIABETES MELLITUS (HCC): Primary | ICD-10-CM

## 2021-07-22 LAB
ALBUMIN SERPL BCP-MCNC: 3.7 G/DL (ref 3.5–5)
ALP SERPL-CCNC: 199 U/L (ref 46–116)
ALT SERPL W P-5'-P-CCNC: 103 U/L (ref 12–78)
ANION GAP SERPL CALCULATED.3IONS-SCNC: 8 MMOL/L (ref 4–13)
AST SERPL W P-5'-P-CCNC: 57 U/L (ref 5–45)
ATRIAL RATE: 86 BPM
BACTERIA UR QL AUTO: ABNORMAL /HPF
BASOPHILS # BLD AUTO: 0.03 THOUSANDS/ΜL (ref 0–0.1)
BASOPHILS NFR BLD AUTO: 0 % (ref 0–1)
BILIRUB SERPL-MCNC: 0.61 MG/DL (ref 0.2–1)
BILIRUB UR QL STRIP: ABNORMAL
BUN SERPL-MCNC: 22 MG/DL (ref 5–25)
CALCIUM SERPL-MCNC: 9.1 MG/DL (ref 8.3–10.1)
CHLORIDE SERPL-SCNC: 102 MMOL/L (ref 100–108)
CLARITY UR: CLEAR
CO2 SERPL-SCNC: 29 MMOL/L (ref 21–32)
COLOR UR: YELLOW
CREAT SERPL-MCNC: 1.03 MG/DL (ref 0.6–1.3)
EOSINOPHIL # BLD AUTO: 0.01 THOUSAND/ΜL (ref 0–0.61)
EOSINOPHIL NFR BLD AUTO: 0 % (ref 0–6)
ERYTHROCYTE [DISTWIDTH] IN BLOOD BY AUTOMATED COUNT: 13.6 % (ref 11.6–15.1)
GFR SERPL CREATININE-BSD FRML MDRD: 93 ML/MIN/1.73SQ M
GLUCOSE SERPL-MCNC: 233 MG/DL (ref 65–140)
GLUCOSE UR STRIP-MCNC: ABNORMAL MG/DL
HCT VFR BLD AUTO: 35 % (ref 36.5–49.3)
HGB BLD-MCNC: 11.2 G/DL (ref 12–17)
HGB UR QL STRIP.AUTO: ABNORMAL
IMM GRANULOCYTES # BLD AUTO: 0.03 THOUSAND/UL (ref 0–0.2)
IMM GRANULOCYTES NFR BLD AUTO: 0 % (ref 0–2)
KETONES UR STRIP-MCNC: ABNORMAL MG/DL
LEUKOCYTE ESTERASE UR QL STRIP: NEGATIVE
LYMPHOCYTES # BLD AUTO: 0.92 THOUSANDS/ΜL (ref 0.6–4.47)
LYMPHOCYTES NFR BLD AUTO: 12 % (ref 14–44)
MCH RBC QN AUTO: 32.7 PG (ref 26.8–34.3)
MCHC RBC AUTO-ENTMCNC: 32 G/DL (ref 31.4–37.4)
MCV RBC AUTO: 102 FL (ref 82–98)
MONOCYTES # BLD AUTO: 0.49 THOUSAND/ΜL (ref 0.17–1.22)
MONOCYTES NFR BLD AUTO: 7 % (ref 4–12)
NEUTROPHILS # BLD AUTO: 5.99 THOUSANDS/ΜL (ref 1.85–7.62)
NEUTS SEG NFR BLD AUTO: 81 % (ref 43–75)
NITRITE UR QL STRIP: NEGATIVE
NON-SQ EPI CELLS URNS QL MICRO: ABNORMAL /HPF
NRBC BLD AUTO-RTO: 0 /100 WBCS
P AXIS: 90 DEGREES
PH UR STRIP.AUTO: 5.5 [PH] (ref 4.5–8)
PLATELET # BLD AUTO: 186 THOUSANDS/UL (ref 149–390)
PMV BLD AUTO: 12 FL (ref 8.9–12.7)
POTASSIUM SERPL-SCNC: 3.9 MMOL/L (ref 3.5–5.3)
PR INTERVAL: 166 MS
PROT SERPL-MCNC: 7.8 G/DL (ref 6.4–8.2)
PROT UR STRIP-MCNC: >=300 MG/DL
QRS AXIS: 12 DEGREES
QRSD INTERVAL: 82 MS
QT INTERVAL: 340 MS
QTC INTERVAL: 406 MS
RBC # BLD AUTO: 3.43 MILLION/UL (ref 3.88–5.62)
RBC #/AREA URNS AUTO: ABNORMAL /HPF
SODIUM SERPL-SCNC: 139 MMOL/L (ref 136–145)
SP GR UR STRIP.AUTO: >=1.03 (ref 1–1.03)
T WAVE AXIS: 77 DEGREES
UROBILINOGEN UR QL STRIP.AUTO: 0.2 E.U./DL
VENTRICULAR RATE: 86 BPM
WBC # BLD AUTO: 7.47 THOUSAND/UL (ref 4.31–10.16)
WBC #/AREA URNS AUTO: ABNORMAL /HPF

## 2021-07-22 PROCEDURE — 99284 EMERGENCY DEPT VISIT MOD MDM: CPT | Performed by: EMERGENCY MEDICINE

## 2021-07-22 PROCEDURE — 99285 EMERGENCY DEPT VISIT HI MDM: CPT

## 2021-07-22 PROCEDURE — 93010 ELECTROCARDIOGRAM REPORT: CPT

## 2021-07-22 PROCEDURE — 96360 HYDRATION IV INFUSION INIT: CPT

## 2021-07-22 PROCEDURE — 96361 HYDRATE IV INFUSION ADD-ON: CPT

## 2021-07-22 PROCEDURE — 85025 COMPLETE CBC W/AUTO DIFF WBC: CPT | Performed by: EMERGENCY MEDICINE

## 2021-07-22 PROCEDURE — 80053 COMPREHEN METABOLIC PANEL: CPT | Performed by: EMERGENCY MEDICINE

## 2021-07-22 PROCEDURE — 81001 URINALYSIS AUTO W/SCOPE: CPT

## 2021-07-22 PROCEDURE — 93005 ELECTROCARDIOGRAM TRACING: CPT

## 2021-07-22 PROCEDURE — 36415 COLL VENOUS BLD VENIPUNCTURE: CPT | Performed by: EMERGENCY MEDICINE

## 2021-07-22 RX ORDER — AMLODIPINE BESYLATE 10 MG/1
10 TABLET ORAL ONCE
Status: COMPLETED | OUTPATIENT
Start: 2021-07-22 | End: 2021-07-22

## 2021-07-22 RX ADMIN — SODIUM CHLORIDE 1000 ML: 0.9 INJECTION, SOLUTION INTRAVENOUS at 19:42

## 2021-07-22 RX ADMIN — AMLODIPINE BESYLATE 10 MG: 10 TABLET ORAL at 21:33

## 2021-07-22 NOTE — ED PROVIDER NOTES
History  Chief Complaint   Patient presents with    Weakness - Generalized     Increased weakness and fatigue over the past couple of days  Decreased appetite, indigestion  blood glucose high at home  59 yo M with IDDM, HTN, brought to ED by his daughter, who said she hadn't heard from him for a couple days, and when she called he didn't answer his phone, so she went over to check on him  She found him alert, in his bed, appearing listless, and c/o feeling weak and tired  Upon further investigation, he said he couldn't remember the last time he ate, and he hasn't been checking his blood sugar or using his insulin  And apparently he has "aids" that come to help him with non medical tasks in his home, which he qualified for through a government program, although his daughter can't elaborate on what criteria were used to initiate it  Regardless, due to some logistical circumstances he has not had any assistance for at least two days  She just says he's chronically, "not well, and he needs a lot of help"  He denies alcohol, drugs, or smoking  He is alert, oriented, but appears cachectic and chronically ill  He denies any acute pain, headache, chest pain, or shortness of breath  He just says he hasn't been feeling well for a couple days and doesn't remember if he has been eating or drinking  History provided by:  Patient and relative      Prior to Admission Medications   Prescriptions Last Dose Informant Patient Reported? Taking?    Insulin Lispro (ADMELOG SC)   Yes No   Sig: Inject 5 Units under the skin 3 (three) times a day with meals   JARDIANCE 10 MG TABS  Self Yes Yes   Sig: Take 10 mg by mouth daily    amLODIPine (NORVASC) 10 mg tablet  Self No Yes   Sig: Take 1 tablet (10 mg total) by mouth daily   atorvastatin (LIPITOR) 40 mg tablet   Yes Yes   Sig: Take 40 mg by mouth daily   busPIRone (BUSPAR) 5 mg tablet  Self Yes Yes   Sig: Take 5 mg by mouth 2 (two) times a day   citalopram (CeleXA) 40 mg tablet  Self Yes Yes   Sig: Take 40 mg by mouth daily   dicyclomine (BENTYL) 20 mg tablet   Yes Yes   Sig: Take 20 mg by mouth 2 (two) times a day   famotidine (PEPCID) 20 mg tablet  Self No Yes   Sig: Take 1 tablet (20 mg total) by mouth daily   ferrous sulfate 324 (65 Fe) mg Not Taking at Unknown time Self No No   Sig: Take 1 tablet (324 mg total) by mouth daily before breakfast   Patient not taking: Reported on 9/29/2020   gabapentin (NEURONTIN) 100 mg capsule  Self No Yes   Sig: Take 1 capsule (100 mg total) by mouth 2 (two) times a day   insulin glargine (BASAGLAR KWIKPEN) 100 units/mL injection pen  Self No Yes   Sig: Inject 14 Units under the skin daily before breakfast   Patient taking differently: Inject 12 Units under the skin daily before breakfast    levETIRAcetam (KEPPRA) 750 mg tablet   No Yes   Sig: Take 1 tablet (750 mg total) by mouth every 12 (twelve) hours   Patient taking differently: Take 500 mg by mouth every 12 (twelve) hours    metoclopramide (REGLAN) 10 mg tablet Not Taking at Unknown time  No No   Sig: Take 1 tablet (10 mg total) by mouth every 6 (six) hours   Patient not taking: Reported on 7/22/2021   mirtazapine (REMERON) 15 mg tablet  Self No Yes   Sig: Take 1 tablet (15 mg total) by mouth daily at bedtime   omeprazole (PriLOSEC) 40 MG capsule   Yes Yes   Sig: Take 40 mg by mouth daily   rivaroxaban (XARELTO) 20 mg tablet  Self Yes Yes   Sig: Take 20 mg by mouth      Facility-Administered Medications: None       Past Medical History:   Diagnosis Date    Anxiety     Depression     Diabetes mellitus (HCC)     Esophagogastric ring     GERD (gastroesophageal reflux disease)     Hepatic hemangioma     History of kidney cancer     Hx of urinary infection     Hyperlipidemia     Hypertension     Migraine     Pancreatitis     PE (pulmonary thromboembolism) (HCC)     Psychiatric disorder     Seizures (HCC)        Past Surgical History:   Procedure Laterality Date    CHOLECYSTECTOMY      COLONOSCOPY  01/2020    0 8 mm sessile polyp removed adenoma, bxs negative - done by Dr Antonette Parham POLYPECTOMY  12/2019    5mm sigmoid polyp, adenoma - Dr Paulie Rodriguez CT CYSTOGRAM  1/28/2020    EGD  04/2013    chronic antral gastritis w/ intestinal metaplasia    ESOPHAGOGASTRODUODENOSCOPY N/A 9/7/2016    Procedure: ESOPHAGOGASTRODUODENOSCOPY (EGD); Surgeon: Nithya Villaseñor MD;  Location: [facility]; Service:        History reviewed  No pertinent family history  I have reviewed and agree with the history as documented  E-Cigarette/Vaping    E-Cigarette Use Never User      E-Cigarette/Vaping Substances    Nicotine No     THC No     CBD No     Flavoring No     Other No     Unknown No      Social History     Tobacco Use    Smoking status: Never Smoker    Smokeless tobacco: Never Used   Vaping Use    Vaping Use: Never used   Substance Use Topics    Alcohol use: Never    Drug use: Yes     Types: Marijuana       Review of Systems   Constitutional: Positive for appetite change and fatigue  Neurological: Positive for weakness  All other systems reviewed and are negative  Physical Exam  Physical Exam  Vitals and nursing note reviewed  Constitutional:       Appearance: Normal appearance  He is well-developed and underweight  He is not toxic-appearing or diaphoretic  HENT:      Head: Normocephalic  Right Ear: Tympanic membrane and external ear normal       Left Ear: External ear normal       Nose: Nose normal    Eyes:      General: Lids are normal       Conjunctiva/sclera: Conjunctivae normal       Pupils: Pupils are equal, round, and reactive to light  Neck:      Vascular: No JVD  Meningeal: Brudzinski's sign and Kernig's sign absent  Cardiovascular:      Rate and Rhythm: Normal rate and regular rhythm  Heart sounds: Normal heart sounds  No murmur heard       Pulmonary:      Effort: Pulmonary effort is normal  No tachypnea, accessory muscle usage or respiratory distress  Breath sounds: Normal breath sounds  No wheezing  Abdominal:      General: Bowel sounds are normal  There is no distension  Palpations: Abdomen is soft  Abdomen is not rigid  There is no mass  Tenderness: There is no abdominal tenderness  There is no guarding or rebound  Musculoskeletal:         General: Normal range of motion  Cervical back: Normal range of motion and neck supple  Lymphadenopathy:      Head:      Right side of head: No submental, submandibular, preauricular or posterior auricular adenopathy  Left side of head: No submental, submandibular, preauricular or posterior auricular adenopathy  Cervical: No cervical adenopathy  Skin:     General: Skin is warm and dry  Findings: No rash  Neurological:      Mental Status: He is alert and oriented to person, place, and time  GCS: GCS eye subscore is 4  GCS verbal subscore is 5  GCS motor subscore is 6  Cranial Nerves: No cranial nerve deficit  Sensory: No sensory deficit  Coordination: Coordination normal       Deep Tendon Reflexes: Reflexes are normal and symmetric  Psychiatric:         Attention and Perception: Attention normal          Speech: Speech is delayed  Behavior: Behavior is slowed  Behavior is cooperative  Cognition and Memory: He exhibits impaired recent memory           Vital Signs  ED Triage Vitals [07/22/21 1802]   Temperature Pulse Respirations Blood Pressure SpO2   98 3 °F (36 8 °C) 94 18 163/79 97 %      Temp Source Heart Rate Source Patient Position - Orthostatic VS BP Location FiO2 (%)   Oral Monitor Sitting Right arm --      Pain Score       No Pain           Vitals:    07/22/21 1802 07/22/21 2000 07/22/21 2133   BP: 163/79 (!) 192/79 (!) 210/104   Pulse: 94 84    Patient Position - Orthostatic VS: Sitting Lying          Visual Acuity      ED Medications  Medications   sodium chloride 0 9 % bolus 1,000 mL (1,000 mL Intravenous New Bag 7/22/21 1942)   amLODIPine (NORVASC) tablet 10 mg (10 mg Oral Given 7/22/21 2133)       Diagnostic Studies  Results Reviewed     Procedure Component Value Units Date/Time    Urine Microscopic [[id number]] Collected: 07/22/21 2136    Lab Status:  In process Specimen: Urine, Other Updated: 07/22/21 2145    Urine Macroscopic, POC [[id number]]  (Abnormal) Collected: 07/22/21 2136    Lab Status: Final result Specimen: Urine Updated: 07/22/21 2138     Color, UA Yellow     Clarity, UA Clear     pH, UA 5 5     Leukocytes, UA Negative     Nitrite, UA Negative     Protein, UA >=300 mg/dl      Glucose,  (1/2%) mg/dl      Ketones, UA 15 (1+) mg/dl      Urobilinogen, UA 0 2 E U /dl      Bilirubin, UA Interference- unable to analyze     Blood, UA Moderate     Specific Gravity, UA >=1 030    Narrative:      [facility] RESULT    Comprehensive metabolic panel [[id number]]  (Abnormal) Collected: 07/22/21 2039    Lab Status: Final result Specimen: Blood from Arm, Right Updated: 07/22/21 2106     Sodium 139 mmol/L      Potassium 3 9 mmol/L      Chloride 102 mmol/L      CO2 29 mmol/L      ANION GAP 8 mmol/L      BUN 22 mg/dL      Creatinine 1 03 mg/dL      Glucose 233 mg/dL      Calcium 9 1 mg/dL      AST 57 U/L       U/L      Alkaline Phosphatase 199 U/L      Total Protein 7 8 g/dL      Albumin 3 7 g/dL      Total Bilirubin 0 61 mg/dL      eGFR 93 ml/min/1 73sq m     Narrative:      [facility] guidelines for Chronic Kidney Disease (CKD):     Stage 1 with normal or high GFR (GFR > 90 mL/min/1 73 square meters)    Stage 2 Mild CKD (GFR = 60-89 mL/min/1 73 square meters)    Stage 3A Moderate CKD (GFR = 45-59 mL/min/1 73 square meters)    Stage 3B Moderate CKD (GFR = 30-44 mL/min/1 73 square meters)    Stage 4 Severe CKD (GFR = 15-29 mL/min/1 73 square meters)    Stage 5 End Stage CKD (GFR <15 mL/min/1 73 square meters)  Note: GFR calculation is accurate only with a steady state creatinine CBC and differential [[id number]]  (Abnormal) Collected: 07/22/21 1856    Lab Status: Final result Specimen: Blood from Arm, Right Updated: 07/22/21 1903     WBC 7 47 Thousand/uL      RBC 3 43 Million/uL      Hemoglobin 11 2 g/dL      Hematocrit 35 0 %       fL      MCH 32 7 pg      MCHC 32 0 g/dL      RDW 13 6 %      MPV 12 0 fL      Platelets 484 Thousands/uL      nRBC 0 /100 WBCs      Neutrophils Relative 81 %      Immat GRANS % 0 %      Lymphocytes Relative 12 %      Monocytes Relative 7 %      Eosinophils Relative 0 %      Basophils Relative 0 %      Neutrophils Absolute 5 99 Thousands/µL      Immature Grans Absolute 0 03 Thousand/uL      Lymphocytes Absolute 0 92 Thousands/µL      Monocytes Absolute 0 49 Thousand/µL      Eosinophils Absolute 0 01 Thousand/µL      Basophils Absolute 0 03 Thousands/µL                  No orders to display              Procedures  Procedures         ED Course  ED Course as of Jul 22 2157   u Jul 22, 2021 2112 Hyperglycemia without DKA, transaminitis is chronic and variable      2137 He is ambulatory, I have talked with his daughter  She is experienced with his chronic condition, and she says he is at his functional status baseline  She said the home aid who helps with tasks has been set up to come within the next two days and possibly tomorrow, and if not then she or his sister can help  I am giving his night blood pressure medication  2144 Mild ketosis, likely volume,  No UTI   Ketones, UA(!): 15 (1+)   2153 Another daughter arrived, and she also agreed this issue was because his aid did not come, which they are remedying and can help him in the meantime                                                  MDM    Disposition  Final diagnoses:   Hyperglycemia due to type 2 diabetes mellitus (HCC)   Volume depletion     Time reflects when diagnosis was documented in both MDM as applicable and the Disposition within this note     Time User Action Codes Description Comment    7/22/2021  9:45 PM Nelda Mccurdymelony GARCIA Add [E11 65] Hyperglycemia due to type 2 diabetes mellitus ([facility] [address] )     7/22/2021  9:46 PM Mai Vasquez Add [E86 9] Volume depletion       ED Disposition     ED Disposition Condition Date/Time Comment    Discharge Good u Jul 22, 2021  9:45 PM Nino Gant discharge to home/self care  Follow-up Information     Follow up With Specialties Details Why Contact Info    Anu Renteria MD Family Medicine Go to  For followup [address] [place] [id number]  [phone]            Patient's Medications   Discharge Prescriptions    No medications on file     No discharge procedures on file      PDMP Review     None          ED Provider  Electronically Signed by           Jordan Turner MD  07/22/21 1175

## 2021-07-23 NOTE — DISCHARGE INSTRUCTIONS
Continue medications as prescribed  Followup with primary care as soon as possible  Drink plenty of fluid  Return as needed for new concerns

## 2022-05-31 NOTE — ASSESSMENT & PLAN NOTE
Hold statin due to elevated LFTs Pts , Jad in to visit. Pt walked in hallway holding hands with  and ate dinner with her.  said Angel to pt and pt became very upset, calling after him when he left. Sitter with pt.

## 2023-06-20 ENCOUNTER — OFFICE VISIT (OUTPATIENT)
Dept: DENTISTRY | Facility: CLINIC | Age: 58
End: 2023-06-20

## 2023-06-20 VITALS — SYSTOLIC BLOOD PRESSURE: 152 MMHG | HEART RATE: 70 BPM | TEMPERATURE: 92.1 F | DIASTOLIC BLOOD PRESSURE: 76 MMHG

## 2023-06-20 DIAGNOSIS — Z01.20 ENCOUNTER FOR DENTAL EXAMINATION: Primary | ICD-10-CM

## 2023-06-20 PROCEDURE — D0191 ASSESSMENT OF A PATIENT: HCPCS | Performed by: DENTIST

## 2023-06-20 PROCEDURE — D0210 INTRAORAL - COMPLETE SERIES OF RADIOGRAPHIC IMAGES: HCPCS | Performed by: DENTIST
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
## 2023-07-09 ENCOUNTER — APPOINTMENT (EMERGENCY)
Dept: CT IMAGING | Facility: HOSPITAL | Age: 58
End: 2023-07-09
Payer: COMMERCIAL

## 2023-07-09 ENCOUNTER — HOSPITAL ENCOUNTER (OUTPATIENT)
Facility: HOSPITAL | Age: 58
Setting detail: OBSERVATION
Discharge: HOME HEALTH | End: 2023-07-10
Attending: EMERGENCY MEDICINE | Admitting: INTERNAL MEDICINE
Payer: COMMERCIAL

## 2023-07-09 DIAGNOSIS — R26.2 AMBULATORY DYSFUNCTION: Chronic | ICD-10-CM

## 2023-07-09 DIAGNOSIS — G93.40 ACUTE ENCEPHALOPATHY: ICD-10-CM

## 2023-07-09 DIAGNOSIS — K52.9 COLITIS: Primary | ICD-10-CM

## 2023-07-09 LAB
ALBUMIN SERPL BCP-MCNC: 4.3 G/DL (ref 3.5–5)
ALP SERPL-CCNC: 132 U/L (ref 34–104)
ALT SERPL W P-5'-P-CCNC: 99 U/L (ref 7–52)
ANION GAP SERPL CALCULATED.3IONS-SCNC: 10 MMOL/L
APTT PPP: 23 SECONDS (ref 23–37)
AST SERPL W P-5'-P-CCNC: 47 U/L (ref 13–39)
ATRIAL RATE: 93 BPM
BASOPHILS # BLD AUTO: 0.04 THOUSANDS/ÂΜL (ref 0–0.1)
BASOPHILS NFR BLD AUTO: 0 % (ref 0–1)
BILIRUB SERPL-MCNC: 0.61 MG/DL (ref 0.2–1)
BUN SERPL-MCNC: 24 MG/DL (ref 5–25)
CALCIUM SERPL-MCNC: 10 MG/DL (ref 8.4–10.2)
CARDIAC TROPONIN I PNL SERPL HS: 10 NG/L
CHLORIDE SERPL-SCNC: 99 MMOL/L (ref 96–108)
CO2 SERPL-SCNC: 30 MMOL/L (ref 21–32)
CREAT SERPL-MCNC: 1.37 MG/DL (ref 0.6–1.3)
EOSINOPHIL # BLD AUTO: 0.01 THOUSAND/ÂΜL (ref 0–0.61)
EOSINOPHIL NFR BLD AUTO: 0 % (ref 0–6)
ERYTHROCYTE [DISTWIDTH] IN BLOOD BY AUTOMATED COUNT: 15.2 % (ref 11.6–15.1)
GFR SERPL CREATININE-BSD FRML MDRD: 56 ML/MIN/1.73SQ M
GLUCOSE SERPL-MCNC: 170 MG/DL (ref 65–140)
GLUCOSE SERPL-MCNC: 184 MG/DL (ref 65–140)
HCT VFR BLD AUTO: 38.1 % (ref 36.5–49.3)
HGB BLD-MCNC: 12.2 G/DL (ref 12–17)
IMM GRANULOCYTES # BLD AUTO: 0.03 THOUSAND/UL (ref 0–0.2)
IMM GRANULOCYTES NFR BLD AUTO: 0 % (ref 0–2)
INR PPP: 0.98 (ref 0.84–1.19)
LIPASE SERPL-CCNC: 294 U/L (ref 11–82)
LYMPHOCYTES # BLD AUTO: 0.91 THOUSANDS/ÂΜL (ref 0.6–4.47)
LYMPHOCYTES NFR BLD AUTO: 9 % (ref 14–44)
MCH RBC QN AUTO: 30.4 PG (ref 26.8–34.3)
MCHC RBC AUTO-ENTMCNC: 32 G/DL (ref 31.4–37.4)
MCV RBC AUTO: 95 FL (ref 82–98)
MONOCYTES # BLD AUTO: 0.59 THOUSAND/ÂΜL (ref 0.17–1.22)
MONOCYTES NFR BLD AUTO: 6 % (ref 4–12)
NEUTROPHILS # BLD AUTO: 8.26 THOUSANDS/ÂΜL (ref 1.85–7.62)
NEUTS SEG NFR BLD AUTO: 85 % (ref 43–75)
NRBC BLD AUTO-RTO: 0 /100 WBCS
P AXIS: 91 DEGREES
PLATELET # BLD AUTO: 189 THOUSANDS/UL (ref 149–390)
PMV BLD AUTO: 11.2 FL (ref 8.9–12.7)
POTASSIUM SERPL-SCNC: 3.9 MMOL/L (ref 3.5–5.3)
PR INTERVAL: 172 MS
PROT SERPL-MCNC: 8.6 G/DL (ref 6.4–8.4)
PROTHROMBIN TIME: 13 SECONDS (ref 11.6–14.5)
QRS AXIS: 59 DEGREES
QRSD INTERVAL: 78 MS
QT INTERVAL: 340 MS
QTC INTERVAL: 422 MS
RBC # BLD AUTO: 4.01 MILLION/UL (ref 3.88–5.62)
SODIUM SERPL-SCNC: 139 MMOL/L (ref 135–147)
T WAVE AXIS: 85 DEGREES
VENTRICULAR RATE: 93 BPM
WBC # BLD AUTO: 9.84 THOUSAND/UL (ref 4.31–10.16)

## 2023-07-09 PROCEDURE — 83690 ASSAY OF LIPASE: CPT | Performed by: EMERGENCY MEDICINE

## 2023-07-09 PROCEDURE — 99284 EMERGENCY DEPT VISIT MOD MDM: CPT

## 2023-07-09 PROCEDURE — 85730 THROMBOPLASTIN TIME PARTIAL: CPT | Performed by: EMERGENCY MEDICINE

## 2023-07-09 PROCEDURE — 84443 ASSAY THYROID STIM HORMONE: CPT

## 2023-07-09 PROCEDURE — 85610 PROTHROMBIN TIME: CPT | Performed by: EMERGENCY MEDICINE

## 2023-07-09 PROCEDURE — 74177 CT ABD & PELVIS W/CONTRAST: CPT

## 2023-07-09 PROCEDURE — 96374 THER/PROPH/DIAG INJ IV PUSH: CPT

## 2023-07-09 PROCEDURE — 83036 HEMOGLOBIN GLYCOSYLATED A1C: CPT

## 2023-07-09 PROCEDURE — 80053 COMPREHEN METABOLIC PANEL: CPT | Performed by: EMERGENCY MEDICINE

## 2023-07-09 PROCEDURE — G1004 CDSM NDSC: HCPCS

## 2023-07-09 PROCEDURE — 84145 PROCALCITONIN (PCT): CPT

## 2023-07-09 PROCEDURE — 85025 COMPLETE CBC W/AUTO DIFF WBC: CPT | Performed by: EMERGENCY MEDICINE

## 2023-07-09 PROCEDURE — 84484 ASSAY OF TROPONIN QUANT: CPT | Performed by: EMERGENCY MEDICINE

## 2023-07-09 PROCEDURE — 93005 ELECTROCARDIOGRAM TRACING: CPT

## 2023-07-09 PROCEDURE — 82948 REAGENT STRIP/BLOOD GLUCOSE: CPT

## 2023-07-09 PROCEDURE — 96361 HYDRATE IV INFUSION ADD-ON: CPT

## 2023-07-09 PROCEDURE — 93010 ELECTROCARDIOGRAM REPORT: CPT

## 2023-07-09 PROCEDURE — 84439 ASSAY OF FREE THYROXINE: CPT

## 2023-07-09 PROCEDURE — 36415 COLL VENOUS BLD VENIPUNCTURE: CPT | Performed by: EMERGENCY MEDICINE

## 2023-07-09 RX ORDER — ONDANSETRON 2 MG/ML
4 INJECTION INTRAMUSCULAR; INTRAVENOUS ONCE
Status: COMPLETED | OUTPATIENT
Start: 2023-07-09 | End: 2023-07-09

## 2023-07-09 RX ORDER — DIPHENOXYLATE HYDROCHLORIDE AND ATROPINE SULFATE 2.5; .025 MG/1; MG/1
1 TABLET ORAL ONCE
Status: COMPLETED | OUTPATIENT
Start: 2023-07-09 | End: 2023-07-09

## 2023-07-09 RX ADMIN — SODIUM CHLORIDE 1000 ML: 0.9 INJECTION, SOLUTION INTRAVENOUS at 22:38

## 2023-07-09 RX ADMIN — ONDANSETRON 4 MG: 2 INJECTION INTRAMUSCULAR; INTRAVENOUS at 22:36

## 2023-07-09 RX ADMIN — DIPHENOXYLATE HYDROCHLORIDE AND ATROPINE SULFATE 1 TABLET: .025; 2.5 TABLET ORAL at 22:37

## 2023-07-09 RX ADMIN — IOHEXOL 100 ML: 350 INJECTION, SOLUTION INTRAVENOUS at 23:42

## 2023-07-10 ENCOUNTER — HOME HEALTH ADMISSION (OUTPATIENT)
Dept: HOME HEALTH SERVICES | Facility: HOME HEALTHCARE | Age: 58
End: 2023-07-10
Payer: COMMERCIAL

## 2023-07-10 VITALS
HEART RATE: 84 BPM | DIASTOLIC BLOOD PRESSURE: 80 MMHG | OXYGEN SATURATION: 98 % | RESPIRATION RATE: 18 BRPM | TEMPERATURE: 98.2 F | SYSTOLIC BLOOD PRESSURE: 143 MMHG

## 2023-07-10 PROBLEM — D64.9 ANEMIA: Status: RESOLVED | Noted: 2020-01-28 | Resolved: 2023-07-10

## 2023-07-10 PROBLEM — N28.89 RENAL MASS: Status: ACTIVE | Noted: 2023-07-10

## 2023-07-10 PROBLEM — G93.40 ACUTE ENCEPHALOPATHY: Status: RESOLVED | Noted: 2020-07-03 | Resolved: 2023-07-10

## 2023-07-10 PROBLEM — E11.40 NEUROPATHY DUE TO TYPE 2 DIABETES MELLITUS (HCC): Status: RESOLVED | Noted: 2018-08-10 | Resolved: 2023-07-10

## 2023-07-10 PROBLEM — N39.0 UTI (URINARY TRACT INFECTION): Status: RESOLVED | Noted: 2018-08-10 | Resolved: 2023-07-10

## 2023-07-10 PROBLEM — K52.9 COLITIS: Status: ACTIVE | Noted: 2023-07-10

## 2023-07-10 PROBLEM — R19.7 NAUSEA VOMITING AND DIARRHEA: Status: RESOLVED | Noted: 2020-01-27 | Resolved: 2023-07-10

## 2023-07-10 PROBLEM — R39.89 PNEUMATURIA: Status: RESOLVED | Noted: 2020-01-27 | Resolved: 2023-07-10

## 2023-07-10 PROBLEM — R11.2 NAUSEA VOMITING AND DIARRHEA: Status: RESOLVED | Noted: 2020-01-27 | Resolved: 2023-07-10

## 2023-07-10 PROBLEM — I10 ACCELERATED HYPERTENSION: Status: RESOLVED | Noted: 2018-08-10 | Resolved: 2023-07-10

## 2023-07-10 PROBLEM — R42 DIZZINESS: Status: RESOLVED | Noted: 2020-01-29 | Resolved: 2023-07-10

## 2023-07-10 LAB
2HR DELTA HS TROPONIN: -1 NG/L
ALBUMIN SERPL BCP-MCNC: 4.1 G/DL (ref 3.5–5)
ALP SERPL-CCNC: 114 U/L (ref 34–104)
ALT SERPL W P-5'-P-CCNC: 78 U/L (ref 7–52)
ANION GAP SERPL CALCULATED.3IONS-SCNC: 9 MMOL/L
AST SERPL W P-5'-P-CCNC: 38 U/L (ref 13–39)
ATRIAL RATE: 81 BPM
BACTERIA UR QL AUTO: ABNORMAL /HPF
BILIRUB SERPL-MCNC: 0.55 MG/DL (ref 0.2–1)
BILIRUB UR QL STRIP: NEGATIVE
BUN SERPL-MCNC: 20 MG/DL (ref 5–25)
CALCIUM SERPL-MCNC: 9.2 MG/DL (ref 8.4–10.2)
CARDIAC TROPONIN I PNL SERPL HS: 9 NG/L
CHLORIDE SERPL-SCNC: 104 MMOL/L (ref 96–108)
CLARITY UR: CLEAR
CO2 SERPL-SCNC: 25 MMOL/L (ref 21–32)
COLOR UR: YELLOW
CREAT SERPL-MCNC: 1.13 MG/DL (ref 0.6–1.3)
ERYTHROCYTE [DISTWIDTH] IN BLOOD BY AUTOMATED COUNT: 15.2 % (ref 11.6–15.1)
GFR SERPL CREATININE-BSD FRML MDRD: 70 ML/MIN/1.73SQ M
GLUCOSE SERPL-MCNC: 107 MG/DL (ref 65–140)
GLUCOSE SERPL-MCNC: 118 MG/DL (ref 65–140)
GLUCOSE SERPL-MCNC: 127 MG/DL (ref 65–140)
GLUCOSE UR STRIP-MCNC: ABNORMAL MG/DL
HCT VFR BLD AUTO: 32.4 % (ref 36.5–49.3)
HGB BLD-MCNC: 10.3 G/DL (ref 12–17)
HGB UR QL STRIP.AUTO: ABNORMAL
KETONES UR STRIP-MCNC: NEGATIVE MG/DL
LACTATE SERPL-SCNC: 1.3 MMOL/L (ref 0.5–2)
LEUKOCYTE ESTERASE UR QL STRIP: NEGATIVE
MAGNESIUM SERPL-MCNC: 2 MG/DL (ref 1.9–2.7)
MCH RBC QN AUTO: 30.6 PG (ref 26.8–34.3)
MCHC RBC AUTO-ENTMCNC: 31.8 G/DL (ref 31.4–37.4)
MCV RBC AUTO: 96 FL (ref 82–98)
MUCOUS THREADS UR QL AUTO: ABNORMAL
NITRITE UR QL STRIP: NEGATIVE
NON-SQ EPI CELLS URNS QL MICRO: ABNORMAL /HPF
P AXIS: 95 DEGREES
PH UR STRIP.AUTO: 5 [PH] (ref 4.5–8)
PLATELET # BLD AUTO: 161 THOUSANDS/UL (ref 149–390)
PMV BLD AUTO: 11.3 FL (ref 8.9–12.7)
POTASSIUM SERPL-SCNC: 3.9 MMOL/L (ref 3.5–5.3)
PR INTERVAL: 166 MS
PROCALCITONIN SERPL-MCNC: 0.21 NG/ML
PROT SERPL-MCNC: 7.6 G/DL (ref 6.4–8.4)
PROT UR STRIP-MCNC: >=300 MG/DL
QRS AXIS: 75 DEGREES
QRSD INTERVAL: 88 MS
QT INTERVAL: 358 MS
QTC INTERVAL: 415 MS
RBC # BLD AUTO: 3.37 MILLION/UL (ref 3.88–5.62)
RBC #/AREA URNS AUTO: ABNORMAL /HPF
SODIUM SERPL-SCNC: 138 MMOL/L (ref 135–147)
SP GR UR STRIP.AUTO: 1.01 (ref 1–1.03)
T WAVE AXIS: 75 DEGREES
T4 FREE SERPL-MCNC: 1.01 NG/DL (ref 0.61–1.12)
TSH SERPL DL<=0.05 MIU/L-ACNC: 6.57 UIU/ML (ref 0.45–4.5)
UROBILINOGEN UR QL STRIP.AUTO: 0.2 E.U./DL
VENTRICULAR RATE: 81 BPM
WBC # BLD AUTO: 8.98 THOUSAND/UL (ref 4.31–10.16)
WBC #/AREA URNS AUTO: ABNORMAL /HPF

## 2023-07-10 PROCEDURE — 82948 REAGENT STRIP/BLOOD GLUCOSE: CPT

## 2023-07-10 PROCEDURE — 82705 FATS/LIPIDS FECES QUAL: CPT

## 2023-07-10 PROCEDURE — 99236 HOSP IP/OBS SAME DATE HI 85: CPT | Performed by: INTERNAL MEDICINE

## 2023-07-10 PROCEDURE — 85027 COMPLETE CBC AUTOMATED: CPT | Performed by: INTERNAL MEDICINE

## 2023-07-10 PROCEDURE — 83735 ASSAY OF MAGNESIUM: CPT | Performed by: INTERNAL MEDICINE

## 2023-07-10 PROCEDURE — 87493 C DIFF AMPLIFIED PROBE: CPT

## 2023-07-10 PROCEDURE — 93005 ELECTROCARDIOGRAM TRACING: CPT

## 2023-07-10 PROCEDURE — 36415 COLL VENOUS BLD VENIPUNCTURE: CPT | Performed by: EMERGENCY MEDICINE

## 2023-07-10 PROCEDURE — 97166 OT EVAL MOD COMPLEX 45 MIN: CPT

## 2023-07-10 PROCEDURE — 83605 ASSAY OF LACTIC ACID: CPT

## 2023-07-10 PROCEDURE — 80053 COMPREHEN METABOLIC PANEL: CPT | Performed by: INTERNAL MEDICINE

## 2023-07-10 PROCEDURE — 87505 NFCT AGENT DETECTION GI: CPT

## 2023-07-10 PROCEDURE — 93010 ELECTROCARDIOGRAM REPORT: CPT

## 2023-07-10 PROCEDURE — 84484 ASSAY OF TROPONIN QUANT: CPT | Performed by: EMERGENCY MEDICINE

## 2023-07-10 PROCEDURE — 81001 URINALYSIS AUTO W/SCOPE: CPT

## 2023-07-10 PROCEDURE — 97163 PT EVAL HIGH COMPLEX 45 MIN: CPT

## 2023-07-10 RX ORDER — GABAPENTIN 100 MG/1
100 CAPSULE ORAL 2 TIMES DAILY
Status: DISCONTINUED | OUTPATIENT
Start: 2023-07-10 | End: 2023-07-10 | Stop reason: HOSPADM

## 2023-07-10 RX ORDER — ACETAMINOPHEN 325 MG/1
650 TABLET ORAL EVERY 6 HOURS PRN
Status: DISCONTINUED | OUTPATIENT
Start: 2023-07-10 | End: 2023-07-10 | Stop reason: HOSPADM

## 2023-07-10 RX ORDER — CITALOPRAM 20 MG/1
10 TABLET ORAL 2 TIMES DAILY
Status: DISCONTINUED | OUTPATIENT
Start: 2023-07-10 | End: 2023-07-10 | Stop reason: HOSPADM

## 2023-07-10 RX ORDER — INSULIN GLARGINE 100 [IU]/ML
12 INJECTION, SOLUTION SUBCUTANEOUS EVERY MORNING
Status: DISCONTINUED | OUTPATIENT
Start: 2023-07-10 | End: 2023-07-10 | Stop reason: HOSPADM

## 2023-07-10 RX ORDER — ATORVASTATIN CALCIUM 40 MG/1
40 TABLET, FILM COATED ORAL
Status: DISCONTINUED | OUTPATIENT
Start: 2023-07-10 | End: 2023-07-10 | Stop reason: HOSPADM

## 2023-07-10 RX ORDER — POLYETHYLENE GLYCOL 3350 17 G/17G
17 POWDER, FOR SOLUTION ORAL DAILY PRN
Status: DISCONTINUED | OUTPATIENT
Start: 2023-07-10 | End: 2023-07-10 | Stop reason: HOSPADM

## 2023-07-10 RX ORDER — INSULIN LISPRO 100 [IU]/ML
1-5 INJECTION, SOLUTION INTRAVENOUS; SUBCUTANEOUS
Status: DISCONTINUED | OUTPATIENT
Start: 2023-07-10 | End: 2023-07-10 | Stop reason: HOSPADM

## 2023-07-10 RX ORDER — DICYCLOMINE HCL 20 MG
20 TABLET ORAL 2 TIMES DAILY
Status: DISCONTINUED | OUTPATIENT
Start: 2023-07-10 | End: 2023-07-10 | Stop reason: HOSPADM

## 2023-07-10 RX ORDER — MAGNESIUM HYDROXIDE/ALUMINUM HYDROXICE/SIMETHICONE 120; 1200; 1200 MG/30ML; MG/30ML; MG/30ML
30 SUSPENSION ORAL EVERY 6 HOURS PRN
Status: DISCONTINUED | OUTPATIENT
Start: 2023-07-10 | End: 2023-07-10 | Stop reason: HOSPADM

## 2023-07-10 RX ORDER — METRONIDAZOLE 500 MG/1
500 TABLET ORAL EVERY 8 HOURS SCHEDULED
Qty: 15 TABLET | Refills: 0 | Status: SHIPPED | OUTPATIENT
Start: 2023-07-10 | End: 2023-07-15

## 2023-07-10 RX ORDER — ONDANSETRON 2 MG/ML
4 INJECTION INTRAMUSCULAR; INTRAVENOUS EVERY 6 HOURS PRN
Status: DISCONTINUED | OUTPATIENT
Start: 2023-07-10 | End: 2023-07-10 | Stop reason: HOSPADM

## 2023-07-10 RX ORDER — CEPHALEXIN 500 MG/1
500 CAPSULE ORAL EVERY 8 HOURS SCHEDULED
Qty: 15 CAPSULE | Refills: 0 | Status: SHIPPED | OUTPATIENT
Start: 2023-07-10 | End: 2023-07-15

## 2023-07-10 RX ORDER — PANTOPRAZOLE SODIUM 40 MG/1
40 TABLET, DELAYED RELEASE ORAL
Status: DISCONTINUED | OUTPATIENT
Start: 2023-07-10 | End: 2023-07-10 | Stop reason: HOSPADM

## 2023-07-10 RX ORDER — AMLODIPINE BESYLATE 10 MG/1
10 TABLET ORAL DAILY
Status: DISCONTINUED | OUTPATIENT
Start: 2023-07-10 | End: 2023-07-10 | Stop reason: HOSPADM

## 2023-07-10 RX ORDER — LEVETIRACETAM 500 MG/1
500 TABLET ORAL EVERY 12 HOURS SCHEDULED
Status: DISCONTINUED | OUTPATIENT
Start: 2023-07-10 | End: 2023-07-10 | Stop reason: HOSPADM

## 2023-07-10 RX ORDER — BUSPIRONE HYDROCHLORIDE 5 MG/1
5 TABLET ORAL 2 TIMES DAILY
Status: DISCONTINUED | OUTPATIENT
Start: 2023-07-10 | End: 2023-07-10 | Stop reason: HOSPADM

## 2023-07-10 RX ORDER — LEVETIRACETAM 500 MG/1
500 TABLET ORAL EVERY 12 HOURS SCHEDULED
COMMUNITY
Start: 2023-07-10

## 2023-07-10 RX ORDER — FAMOTIDINE 20 MG/1
20 TABLET, FILM COATED ORAL DAILY
Status: DISCONTINUED | OUTPATIENT
Start: 2023-07-10 | End: 2023-07-10 | Stop reason: HOSPADM

## 2023-07-10 RX ORDER — SODIUM CHLORIDE, SODIUM GLUCONATE, SODIUM ACETATE, POTASSIUM CHLORIDE, MAGNESIUM CHLORIDE, SODIUM PHOSPHATE, DIBASIC, AND POTASSIUM PHOSPHATE .53; .5; .37; .037; .03; .012; .00082 G/100ML; G/100ML; G/100ML; G/100ML; G/100ML; G/100ML; G/100ML
125 INJECTION, SOLUTION INTRAVENOUS CONTINUOUS
Status: DISCONTINUED | OUTPATIENT
Start: 2023-07-10 | End: 2023-07-10 | Stop reason: HOSPADM

## 2023-07-10 RX ADMIN — CITALOPRAM HYDROBROMIDE 10 MG: 20 TABLET ORAL at 08:55

## 2023-07-10 RX ADMIN — DICYCLOMINE HYDROCHLORIDE 20 MG: 20 TABLET ORAL at 08:56

## 2023-07-10 RX ADMIN — RIVAROXABAN 20 MG: 20 TABLET, FILM COATED ORAL at 08:56

## 2023-07-10 RX ADMIN — LEVETIRACETAM 500 MG: 500 TABLET, FILM COATED ORAL at 02:34

## 2023-07-10 RX ADMIN — PANTOPRAZOLE SODIUM 40 MG: 40 TABLET, DELAYED RELEASE ORAL at 05:08

## 2023-07-10 RX ADMIN — SODIUM CHLORIDE, SODIUM GLUCONATE, SODIUM ACETATE, POTASSIUM CHLORIDE, MAGNESIUM CHLORIDE, SODIUM PHOSPHATE, DIBASIC, AND POTASSIUM PHOSPHATE 125 ML/HR: .53; .5; .37; .037; .03; .012; .00082 INJECTION, SOLUTION INTRAVENOUS at 02:16

## 2023-07-10 RX ADMIN — AMLODIPINE BESYLATE 10 MG: 10 TABLET ORAL at 08:55

## 2023-07-10 RX ADMIN — FAMOTIDINE 20 MG: 20 TABLET, FILM COATED ORAL at 08:55

## 2023-07-10 RX ADMIN — SODIUM CHLORIDE, SODIUM GLUCONATE, SODIUM ACETATE, POTASSIUM CHLORIDE, MAGNESIUM CHLORIDE, SODIUM PHOSPHATE, DIBASIC, AND POTASSIUM PHOSPHATE 125 ML/HR: .53; .5; .37; .037; .03; .012; .00082 INJECTION, SOLUTION INTRAVENOUS at 11:29

## 2023-07-10 RX ADMIN — GABAPENTIN 100 MG: 100 CAPSULE ORAL at 08:56

## 2023-07-10 RX ADMIN — BUSPIRONE HYDROCHLORIDE 5 MG: 5 TABLET ORAL at 08:55

## 2023-07-10 RX ADMIN — LEVETIRACETAM 500 MG: 500 TABLET, FILM COATED ORAL at 11:29

## 2023-07-10 NOTE — INCIDENTAL FINDINGS
The following findings require follow up:  Radiographic finding  Findings:   · Diffuse thickening of the colonic wall which may represent colitis. Follow-up with gastroenterology is recommended. Duodenal diverticulum versus peptic ulcer disease. · Thickening of the urinary bladder wall which may represent cystitis. Follow-up with urology. Indeterminate renal lesions as described above. Further evaluation on a nonemergent basis with a renal ultrasound or MRI of the kidneys with contrast on a nonemergent basis.    Follow up required   Follow up should be done     Please notify the following clinician to assist with the follow up:   Dr. Elizabeth Riggins MD

## 2023-07-10 NOTE — OCCUPATIONAL THERAPY NOTE
Occupational Therapy Evaluation     Patient Name: Michelle Harmon  VLYVF'Y Date: 7/10/2023  Problem List  Principal Problem:    Colitis  Active Problems:    Stage 3a chronic kidney disease ([address])    Hypertension    Type 2 diabetes mellitus with hyperglycemia, without long-term current use of insulin (HCC)    Elevated lipase    Chronic anticoagulation    Transaminitis    Gastroparesis    Renal mass    Past Medical History  Past Medical History:   Diagnosis Date    Anxiety     Depression     Diabetes mellitus (HCC)     Esophagogastric ring     GERD (gastroesophageal reflux disease)     Hepatic hemangioma     History of kidney cancer     Hx of urinary infection     Hyperlipidemia     Hypertension     Migraine     Pancreatitis     PE (pulmonary thromboembolism) ([address])     Psychiatric disorder     Seizures (HCC)      Past Surgical History  Past Surgical History:   Procedure Laterality Date    CHOLECYSTECTOMY      COLONOSCOPY  01/2020    0.8 mm sessile polyp removed adenoma, bxs negative - done by Dr Mcgrath    COLONOSCOPY W/ POLYPECTOMY  12/2019    5mm sigmoid polyp, adenoma - Dr Nida Wells    CT CYSTOGRAM  1/28/2020    EGD  04/2013    chronic antral gastritis w/ intestinal metaplasia    ESOPHAGOGASTRODUODENOSCOPY N/A 9/7/2016    Procedure: ESOPHAGOGASTRODUODENOSCOPY (EGD); Surgeon: Eliz Moreno MD;  Location: [facility]; Service:            07/10/23 0849   OT Last Visit   OT Visit Date 07/10/23   Note Type   Note type Evaluation   Additional Comments pt shaking his body while in bed and reports this is normal for him to help him feel better   Pain Assessment   Pain Assessment Tool 0-10   Pain Score 2   Pain Location/Orientation Orientation: Left; Location: Abdomen;Orientation: Lower   Hospital Pain Intervention(s) Ambulation/increased activity;Repositioned; Emotional support   Restrictions/Precautions   Other Precautions Contact/isolation;Cognitive; Bed Alarm; Chair Alarm; Fall Risk;Multiple lines  (mitch)   Home Living Type of [address] One level;Performs ADLs on one level; Able to live on main level with bedroom/bathroom; Elevator   Bathroom Shower/Tub Walk-in shower   Bathroom Toilet Standard  (reports lower sink to push off from)   [employer] in shower; Shower chair   [address]   Additional Comments pt reports lives alone, independependent w/ ADLs, and IADLs, reports use of SPC in community not in apt   Prior Function   Level of [place] Independent with ADLs; Independent with functional mobility; Independent with IADLS   Lives With Alone   Receives Help From Family  (daughters provide transport for him)   IADLs Independent with meal prep; Independent with medication management; Family/Friend/Other provides transportation   Falls in the last 6 months 0   Vocational Retired   Comments pt reports decreased ability to complete tasks while in hospital due to fatigue, pain and dizziness   Lifestyle   Autonomy per pt independent w/ ADLs, independent w/ functional transfers and mobility w/ no AD in apt and SPC in community, independent w/ IADLs   Reciprocal Relationships daughters   Service to Others retired   Intrinsic Gratification tv   Subjective   Subjective "I don't remember things when I am like this"   ADL   Where Assessed Edge of bed   Eating Assistance 5  Supervision/Setup   Grooming Assistance 4  Minimal Assistance   UB Bathing Assistance 5  Supervision/Setup    [address] [facility] 4  [address] [facility] 5  Supervision/Setup   LB Dressing Assistance 3  Moderate [address] [place]  4  [address] 4  Minimal Assistance   Bed Mobility   Supine to Sit 5  Supervision   Additional items Increased time required;HOB elevated; Bedrails;Assist x 1;Verbal cues   Sit to Supine 5  Supervision   Additional items Assist x 1; Increased time required;Verbal cues; Bedrails   Additional Comments cues for positioning and safety; BP supine:171/96 and EOB: 163/92 reports dizziness   Transfers   Sit to Stand 4  Minimal assistance   Additional items Assist x 1; Increased time required;Verbal cues; Bedrails   Stand to Sit 4  Minimal assistance   Additional items Assist x 1; Increased time required;Verbal cues; Bedrails   Additional Comments cues for positioning and safety; pt transferred to recliner and then requesting to return to bed as he did not feel well   Functional Mobility   Functional Mobility 4  Minimal assistance   Additional Comments assist x1 w/ increased time; reaching out for IV pole support   Balance   Static Sitting Fair +   Dynamic Sitting Fair   Static Standing Fair -   Dynamic Standing Poor +   Ambulatory Poor +   Activity Tolerance   Activity Tolerance Patient limited by pain; Patient limited by fatigue;Treatment limited secondary to medical complications (Comment)   Nurse Made Aware appropriate to see per RNDeneen   RUE Assessment   RUE Assessment WFL  (4-/5)   LUE Assessment   LUE Assessment WFL  (3+/5)   Hand Function   Gross Motor Coordination Functional   Fine Motor Coordination Functional   Sensation   Light Touch No apparent deficits   Additional Comments reports slight numbness in L UE   Proprioception   Proprioception No apparent deficits   Vision-Basic Assessment   Current Vision No visual deficits   Vision - Complex Assessment   Ocular Range of Motion Intact   Perception   Inattention/Neglect Appears intact   Cognition   Overall Cognitive Status Impaired   Arousal/Participation Alert; Cooperative;Responsive   Attention Attends with cues to redirect   Orientation Level Oriented to person;Oriented to place; Disoriented to time;Disoriented to situation  (oriented to year only)   Memory Decreased recall of precautions;Decreased recall of recent events;Decreased short term memory   Following Commands Follows one step commands with increased time or repetition   Comments decreased insight, impaired orientation, impaired safety awareness; recommend formal cognitive assessment   Assessment   Limitation Decreased ADL status; Decreased UE strength;Decreased cognition;Decreased Safe judgement during ADL;Decreased endurance;Decreased self-care trans;Decreased high-level ADLs   Prognosis Good;Fair   Assessment Pt is a 59 y.o. male seen for OT evaluation s/p admit to [facility] on 7/9/2023 w/ abdominal pain, weakness, lightheadedness; Colitis. Comorbidities affecting pt's functional performance at time of assessment include:  CKD III, HTN, DM II, elevated lipase, chronic anticoagulation, transaminitis, gastroparesis, renal mass (renal cell carcinoma) Personal factors affecting pt at time of IE include:limited home support, difficulty performing ADLS, difficulty performing IADLS , limited insight into deficits, flat affect and decreased initiation and engagement . Prior to admission, pt was living alone in apt and reports independent w/ ADLs, independent w/ functional transfers and mobility w/ no AD in apt and SPC in community, independent w/ IADLs, daughters provide transport. Upon evaluation: Pt requires supervision bed mobility, MIN assist sit<>stand functional transfers, MIN assist x1 functional mobility w/ no AD (reaching out for IV pole support), MIN-MOD assist LB ADLs, setup UB ADLs 2* the following increased pain, dizziness, impaired functional reach, decreased strength and endurance, multiple lines, impaired cognition, fall risk, decreased insight. Pt to benefit from continued skilled OT tx while in the hospital to address deficits as defined above and maximize level of functional independence w ADL's and functional mobility. Occupational Performance areas to address include: grooming, bathing/shower, toilet hygiene, dressing, health maintenance, functional mobility, clothing management, cleaning and meal prep. From OT standpoint, recommendation at time of d/c would be short term rehab.    The patient's raw score on the [facility] Daily Activity Inpatient Short Form is 17. A raw score of less than 19 suggests the patient may benefit from discharge to post-acute rehabilitation services. Please refer to the recommendation of the Occupational Therapist for safe discharge planning. Goals   Patient Goals "get better cause I'm alone at home"   LT Time Frame 10-14   Long Term Goal nicholas see below goals   Plan   Treatment Interventions ADL retraining;Functional transfer training;UE strengthening/ROM; Endurance training;Equipment evaluation/education;Patient/family training;Cognitive reorientation; Compensatory technique education; Energy conservation; Activityengagement   Goal Expiration Date 07/24/23   OT Frequency 3-5x/wk   Recommendation   OT Discharge Recommendation Post acute rehabilitation services   [facility] Daily Activity Inpatient   Lower Body Dressing 2   Bathing 2   Toileting 3   Upper Body Dressing 3   Grooming 3   Eating 4   Daily Activity Raw Score 17   Daily Activity Standardized Score (Calc for Raw Score >=11) 37.26   AM-PAC Applied Cognition Inpatient   Following a Speech/Presentation 3   Understanding Ordinary Conversation 3   Taking Medications 2   Remembering Where Things Are Placed or Put Away 2   Remembering List of 4-5 Errands 2   Taking Care of Complicated Tasks 2   Applied Cognition Raw Score 14   Applied Cognition Standardized Score 32.02   Modified Moriah Scale   Modified Moriah Scale 4   End of Consult   Education Provided Yes   Patient Position at End of Consult Bed/Chair alarm activated; All needs within reach; Supine   Nurse Communication Nurse aware of consult     Occupational Therapy Goals to be met in 10-14 days:  1) Pt will improve activity tolerance to G for 30 min txment sessions to enhance ADLs  2) Pt will complete ADLs/self care w/ mod I   3) Pt will complete toileting w/ mod I w/ G hygiene/thoroughness using DME PRN  4) Pt will improve functional transfers on/off all surfaces using DME PRN w/ G balance/safety including toileting w/ mod I  5) Pt will improve fx'l mobility during I/ADl/leisure tasks using DME PRN w/ g balance/safety w/ mod I  6) Pt will engage in ongoing cognitive assessment w/ G participation to A w/ safe d/c planning/recommendations  7) Pt will demonstrate G carryover of pt/caregiver education and training as appropriate w/ mod I  w/ G tolerance  8) Pt will engage in depression screen/leisure interest checklist w/ G participation to monitor s/s depression and ID 3 positive coping strategies to A w/ emotional regulation and management  9) Pt will demonstrate 100% carryover of E.C. techniques w/ mod I t/o fx'l I/ADL/leisure tasks w/o cues s/p skilled education  10) Pt will demonstrate improved bed mobility to MOD I to enhance ADLs  11) Pt will engage in activity configuration activity w/ G participation and mod I to increase time management skills and improve participation in a structured routine to improve overall quality of life  12) Pt will demonstrate 100% carryover of LHAE for LB ADLs/self care and leisure s/p skilled education w/ mod I and G participation  13) Pt will demonstrate improved standing tolerance to 3-5 minutes during functional tasks w/ good - dynamic standing balance to enhance ADL performance  14) Pt will demonstrate improved b/l UE strength by 1 MMT grade to enhance ADLS and functional transfers  15) Pt will be A&Ox 4 w/ utilization of environmental cues for 100% OT sessions    Documentation completed by: Kelsi Da Silva, MS, OTR/L

## 2023-07-10 NOTE — DISCHARGE SUMMARY
[address] [facility]  Discharge- Alberto Brown 1965, 59 y.o. male MRN: [id number]  Unit/Bed#: E5 -01 Encounter: [id number]  Primary Care Provider: Geno Au MD   Date and time admitted to hospital: 7/9/2023  9:18 PM    Admitting Provider:  Johanny Diamond MD  Discharge Provider:  Magdalene Kehr, DO  Admission Date: 7/9/2023       Discharge Date: 07/10/23   LOS: 0  Primary Care Physician at Discharge: Geno Au [phone]    [facility]  · Presentation to hospital for nausea vomiting and diarrhea after eating a bunch of ice cream Snickers bars  · During hospitalization there was no further diarrhea. His vomiting resolved. · He was found to have colitis on imaging. · Discharge: Patient has a very important urology appointment today to discuss his malignancy. He will be discharged with cephalexin/metronidazole for 5 days and will be set up with home services    Chronic anticoagulation  Assessment & Plan  · History of pulmonary embolism on xarelto    Type 2 diabetes mellitus with hyperglycemia, without long-term current use of insulin [facility])  Assessment & Plan  Lab Results   Component Value Date    HGBA1C 9.4 (H) 05/17/2021     Results from last 7 days   Lab Units 07/10/23  1047 07/10/23  0713 07/09/23  2121   POC GLUCOSE mg/dl 118 107 170*     • Did have low sugars from poor intake during hospitalization. • Continue home regimen    Hypertension  Assessment & Plan  · Stable continue amlodipine      REASON FOR ADMISSION  Vomiting (Pt c/o vomiting and high BS for the past 2 days. )    HOSPITAL COURSE:  Alberto Brown is a 59 y.o. male with a history of hypertension diabetes PE and recent diagnosis of urological cancer who presented vomiting and hyperglycemia. He lives alone and reportedly his friend gave him a box of Snickers ice cream bars.   He then developed vomiting and diarrhea and came to the hospital where he was found to have colitis on imaging. With supportive care his symptoms all resolved. He was seen by PT who recommended home with services and OT recommended rehab. Unfortunately she has a very important urology appointment today and will be discharged and set up with home services. Patient and daughter was agreeable to this plan. He also has incidental findings as outlined below which will need follow-up. The case was discussed with daughter on telephone on day of discharge. Please see problem list listed above. CONSULTING PROVIDERS   None    PROCEDURES PERFORMED  * No surgery found *    RADIOLOGY RESULTS  CT abdomen pelvis with contrast    Result Date: 7/10/2023  Impression: Diffuse thickening of the colonic wall which may represent colitis. Follow-up with gastroenterology is recommended. Duodenal diverticulum versus peptic ulcer disease. Thickening of the urinary bladder wall which may represent cystitis. Follow-up with urology. Indeterminate renal lesions as described above. Further evaluation on a nonemergent basis with a renal ultrasound or MRI of the kidneys with contrast on a nonemergent basis. The study was marked in [facility] for immediate notification.  Workstation performed: [id number]       LABS  Results from last 7 days   Lab Units 07/10/23  0354 07/09/23  2248 07/09/23  2222   WBC Thousand/uL 8.98  --  9.84   HEMOGLOBIN g/dL 10.3*  --  12.2   HEMATOCRIT % 32.4*  --  38.1   MCV fL 96  --  95   PLATELETS Thousands/uL 161  --  189   INR   --  0.98  --      Results from last 7 days   Lab Units 07/10/23  0354 07/09/23  2222   SODIUM mmol/L 138 139   POTASSIUM mmol/L 3.9 3.9   CHLORIDE mmol/L 104 99   CO2 mmol/L 25 30   BUN mg/dL 20 24   CREATININE mg/dL 1.13 1.37*   CALCIUM mg/dL 9.2 10.0   ALBUMIN g/dL 4.1 4.3   TOTAL BILIRUBIN mg/dL 0.55 0.61   ALK PHOS U/L 114* 132*   ALT U/L 78* 99*   AST U/L 38 47*   EGFR ml/min/1.73sq m 70 56   GLUCOSE RANDOM mg/dL 127 184*         Results from last 7 days Lab Units 07/10/23  0033 07/09/23  2222   HS TNI 0HR ng/L  --  10   HS TNI 2HR ng/L 9  --               Results from last 7 days   Lab Units 07/10/23  1047 07/10/23  0713 07/09/23  2121   POC GLUCOSE mg/dl 118 107 170*         Results from last 7 days   Lab Units 07/09/23  2222   TSH 3RD GENERATON uIU/mL 6.565*   FREE T4 ng/dL 1.01     Results from last 7 days   Lab Units 07/10/23  0353 07/09/23  2222   LACTIC ACID mmol/L 1.3  --    PROCALCITONIN ng/ml  --  0.21           Cultures:   Results from last 7 days   Lab Units 07/10/23  0119   COLOR UA  Yellow   CLARITY UA  Clear   SPEC GRAV UA  1.015   PH UA  5.0   LEUKOCYTES UA  Negative   NITRITE UA  Negative   GLUCOSE UA mg/dl 500 (1/2%)*   KETONES UA mg/dl Negative   BILIRUBIN UA  Negative   BLOOD UA  Moderate*      Results from last 7 days   Lab Units 07/10/23  0119   RBC UA /hpf 4-10*   WBC UA /hpf 4-10*   EPITHELIAL CELLS WET PREP /hpf Occasional   BACTERIA UA /hpf None Seen                      DISCHARGE DAY VISIT AND PHYSICAL EXAM:  Subjective: Patient seen and examined. Feeling much better. Vitals:   Blood Pressure: 143/80 (07/10/23 1017)  Pulse: 84 (07/10/23 1017)  Temperature: 98.2 °F (36.8 °C) (07/10/23 0711)  Temp Source: Oral (07/10/23 0711)  Respirations: 18 (07/10/23 0711)  SpO2: 98 % (07/10/23 1017)    Physical Exam  Vitals reviewed. Constitutional:       General: He is not in acute distress. HENT:      Head: Atraumatic. Cardiovascular:      Rate and Rhythm: Regular rhythm. Heart sounds: Normal heart sounds. Pulmonary:      Effort: Pulmonary effort is normal.      Breath sounds: No wheezing. Abdominal:      General: Bowel sounds are normal.      Palpations: Abdomen is soft. Tenderness: There is no abdominal tenderness. There is no rebound. Musculoskeletal:         General: No swelling or tenderness. Skin:     General: Skin is warm and dry. Neurological:      General: No focal deficit present.       Mental Status: He is alert.      Cranial Nerves: No cranial nerve deficit. Psychiatric:         Mood and Affect: Mood normal.       Planned Re-admission: No  Discharge Disposition: Home/Self Care    Test Results Pending at Discharge:   Pending Labs     Order Current Status    Hemoglobin A1C w/ EAG Estimation In process      Incidental findings:   · Diffuse thickening of the colonic wall which may represent colitis. Follow-up with gastroenterology is recommended. Duodenal diverticulum versus peptic ulcer disease. · Thickening of the urinary bladder wall which may represent cystitis. Follow-up with urology. Indeterminate renal lesions as described above. Further evaluation on a nonemergent basis with a renal ultrasound or MRI of the kidneys with contrast on a nonemergent basis. Medications   · Discharge Medication List: See after visit summary for reconciled discharge medications. Diet restrictions: Diabetic diet  Activity restrictions: No strenuous activity  Discharge Condition: stable    Outpatient Follow-Up and Discharge Instructions  See after visit summary section titled Discharge Instructions for information provided to patient and family. Code Status: Level 1 - Full Code  Discharge Statement   I spent 35 minutes discharging the patient. This time was spent on the day of discharge. Greater than 50% of total time was spent with the patient and / or family counseling and / or coordination of care. ** Please Note: This note has been constructed using a voice recognition system.  **

## 2023-07-10 NOTE — ASSESSMENT & PLAN NOTE
· Presentation to hospital for nausea vomiting and diarrhea after eating a bunch of ice cream Snickers bars  · During hospitalization there was no further diarrhea. His vomiting resolved. · He was found to have colitis on imaging. · Discharge: Patient has a very important urology appointment today to discuss his malignancy.   He will be discharged with cephalexin/metronidazole for 5 days and will be set up with home services

## 2023-07-10 NOTE — ASSESSMENT & PLAN NOTE
Hypertensive on arrival 2/2 pain/dehydration    Plan:  • Monitor for improvement after fluid resuscitation/pain control   • Continue PTA amlodipine

## 2023-07-10 NOTE — PHYSICAL THERAPY NOTE
PHYSICAL THERAPY EVALUATION          Patient Name: Navi Lopez  CMKHT'L Date: 7/10/2023       07/10/23 1023   PT Last Visit   PT Visit Date 07/10/23   Note Type   Note type Evaluation   Pain Assessment   Pain Assessment Tool 0-10   Pain Score No Pain   Restrictions/Precautions   Other Precautions Contact/isolation;Cognitive; Chair Alarm; Bed Alarm; Fall Risk;Multiple lines   Home Living   Type of [address] One level;Elevator;Able to live on main level with bedroom/bathroom   Home Equipment Cane   Additional Comments SPC use in community   Prior Function   Level of [place] Independent with ADLs; Independent with functional mobility; Independent with IADLS   Lives With Alone   Receives Help From Family  (daughter is local)   IADLs Independent with meal prep; Independent with medication management; Family/Friend/Other provides transportation   Falls in the last 6 months 0   Vocational Retired   General   Additional Pertinent History Admitted 7/9/23 for colitis. Up and OOB as suzette orders. PMH significant for anxiety, depression, DM, kidney CA, HTN, PE. Cognition   Overall Cognitive Status WFL   Arousal/Participation Alert   Attention Attends with cues to redirect   Following Commands Follows one step commands with increased time or repetition   Subjective   Subjective "I feel dizzy right now."   RLE Assessment   RLE Assessment WFL  (assessed functionally)   LLE Assessment   LLE Assessment WFL  (assessed fnctionally)   Vestibular   Vestibular Comments eye exam consisting of tracking in vertical and horizontal directions did not ellicit sx. ?R beating nystagmus w fixed gaze looking R however difficult to determine   Bed Mobility   Supine to Sit 5  Supervision   Additional items Increased time required; Bedrails   Sit to Supine 5  Supervision   Additional items Bedrails; Increased time required   Transfers   Sit to Stand 5  Supervision Additional items Increased time required; Bedrails  (SPC)   Stand to Sit 5  Supervision   Additional items Increased time required; Bedrails   Additional Comments Transfer with use of SPC and UE support from bed. Ambulation/Elevation   Gait pattern Decreased foot clearance; Improper Weight shift; Forward Flexion; Inconsistent penny; Excessively slow; Short stride   Gait Assistance 4  Minimal assist  (CGA)   Additional items Assist x 1   Assistive Device [facility]   Distance 15'   Ambulation/Elevation Additional Comments Use of furntiure for balance. Pt offered RW, but declined. Balance   Static Sitting Fair +   Dynamic Sitting Fair   Static Standing Fair -   Dynamic Standing Poor +   Ambulatory Poor +   Endurance Deficit   Endurance Deficit Yes   Endurance Deficit Description dizziness; /80 post session   Activity Tolerance   Activity Tolerance Patient limited by fatigue;Treatment limited secondary to medical complications (Comment)  (reports increased dizziness/vertigo with activity)   Medical Staff Made Aware PT [place]   Assessment   Prognosis Good   Problem List Decreased strength;Decreased endurance; Impaired balance;Decreased mobility; Decreased safety awareness; Impaired judgement   Assessment Michaela Nielson is a 59 y.o. male admitted to [address] [place] [address] on 7/9/2023 for Colitis. PT was consulted and pt was seen on 7/10/2023 for mobility assessment and d/c planning. Pt presents with increased risk for falls, multiple lines, and contact percautions. Pt is currently functioning at a supervision assistance x1 level for bed mobility, supervision assistance x1 level for transfers, and minimum assistance x1 level for ambulation with [address]. Pt demonstrated impaired balance, gait deviations, and activity intolerance upon initial evaluation. Pt reported sensation of room spinning at the beginning and throughout session. Complains of unsteadiness due to symptoms.  Explained benefits of RW for balance, but pt declined use for IE. Ambulated with SPC and Al x1, but pt had to take rest breaks and use room furniture for increased stability. Functional BP throughout session despite dizziness. Denies concerns for d/c to home but states he wishes to get better before. At baseline, pt was independent with all functional mobility with use of SPC in the community, ADLs, and IADLs with transportation provided by his daughter. Pt will benefit from continued skilled IP PT to address the above mentioned impairments  in order to maximize recovery and increase functional independence when completing mobility and ADLs. Currently PT recommendations for DME include RW. At this time PT recommendations for d/c are home with HHPT vs OPPT if issues w balance persisit, pending continued progress and transportation needs. Barriers to Discharge Decreased caregiver support   Barriers to Discharge Comments lives alone; relies on daughter for transport   Goals   Patient Goals to get better   STG Expiration Date 07/24/23   Short Term Goal #1 (1) Pt will perform bed mobility with mod I demonstrating appropriate form 100% of the time to promote improved functional mobility. (2) Perform all transfers with mod I and appropriate technique 100% of the time to promote ability to safely negotiate home environment. (3) Ambulate at least 250' with LRAD and supervision to promote safe negotiation of home environment. (4) Improve overall strength and balance 1/2 grade to optimize ability to perform functional mobility, ADLs, IADLs and demonstrate reduced fall risk. (5) Increase activity tolerance to 45 minutes to promote endurance for completion of functional tasks. (6) Continue PT for ongoing pt/family education, DME needs, and d/c planning to promote highest level of function in least restricted environment. PT Treatment Day 0   Plan   Treatment/Interventions Functional transfer training;LE strengthening/ROM; Therapeutic exercise; Endurance training;Patient/family training;Equipment eval/education; Bed mobility;Gait training;Elevations;Continued evaluation;Spoke to nursing;Spoke to case management;Spoke to MD;OT   PT Frequency 2-3x/wk   Recommendation   PT Discharge Recommendation Home with home health rehabilitation   Equipment Recommended [facility] Recommended Wheeled walker   Additional Comments home with HHPT vs OPPT pending transportation and progress   AM-PAC Basic Mobility Inpatient   Turning in Flat Bed Without Bedrails 4   Lying on Back to Sitting on Edge of Flat Bed Without Bedrails 4   Moving Bed to Chair 3   Standing Up From Chair Using Arms 3   Walk in Room 3   Climb 3-5 Stairs With Railing 3   Basic Mobility Inpatient Raw Score 20  The patient's AM-PAC Basic Mobility Inpatient Short Form Raw Score is 20. A Raw score of greater than or equal to 16 suggests the patient may benefit from discharge to home. Please also refer to the recommendation of the Physical Therapist for safe discharge planning. Basic Mobility Standardized Score 43.99   Highest Level Of Mobility   JH-HLM Goal 6: Walk 10 steps or more   JH-HLM Achieved 6: Walk 10 steps or more   End of Consult   Patient Position at End of Consult Bed/Chair alarm activated;Supine; All needs within reach     Factors contributing to complexity  History: co-morbidities, social background, fall risk, reliance on DME, multiple lines, cognitive status  Examination of body systems: MSK (strength, ROM), cardiopulmonary, neuromuscular (sensation, balance, gait, transfers, motor function), functional (am-pac), communication (cognition, orientation)  Clinical presentation: unstable  Complexity: high     Dyane Ruddle, SPT

## 2023-07-10 NOTE — PLAN OF CARE
Problem: OCCUPATIONAL THERAPY ADULT  Goal: Performs self-care activities at highest level of function for planned discharge setting. See evaluation for individualized goals. Description: Treatment Interventions: ADL retraining, Functional transfer training, UE strengthening/ROM, Endurance training, Equipment evaluation/education, Patient/family training, Cognitive reorientation, Compensatory technique education, Energy conservation, Activityengagement          See flowsheet documentation for full assessment, interventions and recommendations. Note: Limitation: Decreased ADL status, Decreased UE strength, Decreased cognition, Decreased Safe judgement during ADL, Decreased endurance, Decreased self-care trans, Decreased high-level ADLs  Prognosis: Good, Fair  Assessment: Pt is a 59 y.o. male seen for OT evaluation s/p admit to [facility] on 7/9/2023 w/ abdominal pain, weakness, lightheadedness; Colitis. Comorbidities affecting pt's functional performance at time of assessment include:  CKD III, HTN, DM II, elevated lipase, chronic anticoagulation, transaminitis, gastroparesis, renal mass (renal cell carcinoma) Personal factors affecting pt at time of IE include:limited home support, difficulty performing ADLS, difficulty performing IADLS , limited insight into deficits, flat affect and decreased initiation and engagement . Prior to admission, pt was living alone in apt and reports independent w/ ADLs, independent w/ functional transfers and mobility w/ no AD in apt and SPC in community, independent w/ IADLs, daughters provide transport. Upon evaluation: Pt requires supervision bed mobility, MIN assist sit<>stand functional transfers, MIN assist x1 functional mobility w/ no AD (reaching out for IV pole support), MIN-MOD assist LB ADLs, setup UB ADLs 2* the following increased pain, dizziness, impaired functional reach, decreased strength and endurance, multiple lines, impaired cognition, fall risk, decreased insight. Pt to benefit from continued skilled OT tx while in the hospital to address deficits as defined above and maximize level of functional independence w ADL's and functional mobility. Occupational Performance areas to address include: grooming, bathing/shower, toilet hygiene, dressing, health maintenance, functional mobility, clothing management, cleaning and meal prep. From OT standpoint, recommendation at time of d/c would be short term rehab. The patient's raw score on the -PAC Daily Activity Inpatient Short Form is 17. A raw score of less than 19 suggests the patient may benefit from discharge to post-acute rehabilitation services. Please refer to the recommendation of the Occupational Therapist for safe discharge planning.      OT Discharge Recommendation: Post acute rehabilitation services

## 2023-07-10 NOTE — ASSESSMENT & PLAN NOTE
Hx of diabetic gastroparesis with uncontrolled diabetes     Plan:  • Continue dietary modifications, glycemic control, reglan

## 2023-07-10 NOTE — H&P
[facility]  H&P  Name: Loly Davis 59 y.o. male I MRN: [id number]  Unit/Bed#: ED-24 I Date of Admission: 7/9/2023   Date of Service: 7/10/2023 I Hospital Day: 0      Assessment/Plan   * Colitis  Assessment & Plan  Reports 2 day history of generalized L sided abdominal pain, nausea/non-bloody, non-bilious vomiting, multiple episodes of watery diarrhea non-bloody/melena, denies fevers/chills, no documented fever in ED, no recent travel, no known sick contacts, no known foodborne exposure   No leukocytosis  CT ABD/pelvis reviewed with diffuse thickening of colonic wall, duodenal diverticulum vs peptic ulcer disease, thickening of urinary bladder, indeterminate renal lesions     Plan:  • Infectious / inflammatory etiology, appeared well on exam, bowel soft  • Check viral enteritis / c diff panel   • Hold antibiotics unless febrile, worsening leukocytosis       Renal mass  Assessment & Plan  Currently following Urology at [facility], pending appointment tomorrow regarding treatment options   07/05/2023 MRI abd reviewed: stable anterior mid pole lesion, complex lesions within bilateral kidneys, stable benign hepatic hemangiomas     Gastroparesis  Assessment & Plan  Hx of diabetic gastroparesis with uncontrolled diabetes     Plan:  • Continue dietary modifications, glycemic control, reglan      Transaminitis  Assessment & Plan  Hx of the same, denies alcohol use  07/05/2023 MRI abd reviewed: with stable hemagiomas, no biliary obstruction     Plan:  • Trend in AM labs      Chronic anticoagulation  Assessment & Plan  Hx of PE and now new renal malignancy     Plan:  • Continue Xarelto      Elevated lipase  Assessment & Plan  Hx of pancreatitis, denies pancreatic s/s and pancreas unremarkable on imaging     Type 2 diabetes mellitus with hyperglycemia, without long-term current use of insulin [facility])  Assessment & Plan  Lab Results   Component Value Date    HGBA1C 9.4 (H) 05/17/2021     Plan:  • BG goal 140-200 while inpatient, ACHS sliding scale, Long acting: lantus 12u AM  • PTA regimen: jardiance, lantus, lispro    Hypertension  Assessment & Plan  Hypertensive on arrival 2/2 pain/dehydration    Plan:  • Monitor for improvement after fluid resuscitation/pain control   • Continue PTA amlodipine       Stage 3a chronic kidney disease [facility])  Assessment & Plan  Lab Results   Component Value Date    EGFR 56 07/09/2023    EGFR 93 07/22/2021    EGFR 69 10/22/2020    CREATININE 1.37 (H) 07/09/2023    CREATININE 1.03 07/22/2021    CREATININE 1.33 (H) 10/22/2020     In the setting of poorly controlled hypertensive/diabetic nephropathy   Baseline Cr: 1.3    Plan:  • Admit Cr: 1.37 > trend  • IVF: 1L NS in ED, continue isolyte   • Monitor renal function, renal dose medications, maintain normotension/euvolemia, avoid nephrotoxic agents, I&Os    VTE Pharmacologic Prophylaxis: VTE Score: 3 Moderate Risk (Score 3-4) - Pharmacological DVT Prophylaxis Ordered: enoxaparin (Lovenox). Code Status: Level 1 - Full Code   Discussion with family: Updated  (daughter) at bedside. Anticipated Length of Stay: Patient will be admitted on an observation basis with an anticipated length of stay of less than 2 midnights secondary to colitis. Total Time Spent on Date of Encounter in care of patient: 75 minutes This time was spent on one or more of the following: performing physical exam; counseling and coordination of care; obtaining or reviewing history; documenting in the medical record; reviewing/ordering tests, medications or procedures; communicating with other healthcare professionals and discussing with patient's family/caregivers. Chief Complaint: vomiting    History of Present Illness:  Aleksey Aparicio is a 59 y.o. male with a PMH of HTN, CKD, T2DM, gastroparesis who presents with N/V/D, abdominal pain. History obtained from patient/family/chart review/discussion with ED attending.  He states that over the past 2 days he has been having generalized abdominal pain, nausea/vomiting/diarrhea. Denies known sick contacts, recent travel, or known foodborne exposures. Today he felt weak/lightheaded when he stood-up. His family visited him and he was vomiting without much relief prompting ED visit. He denies fevers/chills. He states his blood sugar was high during this but he was eating a lot of snickers ice cream bars. Denies bloody vomit, coffee-ground emesis, bloody stool, black stools. The diarrhea is loose and watery. He states this has happened in the past. Recently diagnosed with renal cell carcinoma following with [facility]. He denies urinary symptoms like frequency/burning/hematuria. Review of Systems:  Review of Systems   Constitutional: Positive for fatigue. Negative for chills and fever. Respiratory: Negative for shortness of breath. Cardiovascular: Negative for chest pain and palpitations. Gastrointestinal: Positive for abdominal pain, diarrhea, nausea and vomiting. Negative for abdominal distention. Neurological: Negative for syncope.        Past Medical and Surgical History:   Past Medical History:   Diagnosis Date   • Anxiety    • Depression    • Diabetes mellitus ([address])    • Esophagogastric ring    • GERD (gastroesophageal reflux disease)    • Hepatic hemangioma    • History of kidney cancer    • Hx of urinary infection    • Hyperlipidemia    • Hypertension    • Migraine    • Pancreatitis    • PE (pulmonary thromboembolism) ([address])    • Psychiatric disorder    • Seizures (HCC)        Past Surgical History:   Procedure Laterality Date   • CHOLECYSTECTOMY     • COLONOSCOPY  01/2020    0.8 mm sessile polyp removed adenoma, bxs negative - done by Dr Mcgrath   • COLONOSCOPY W/ POLYPECTOMY  12/2019    5mm sigmoid polyp, adenoma - Dr Sylvia Manrique   • CT CYSTOGRAM  1/28/2020   • EGD  04/2013    chronic antral gastritis w/ intestinal metaplasia   • ESOPHAGOGASTRODUODENOSCOPY N/A 9/7/2016    Procedure: ESOPHAGOGASTRODUODENOSCOPY (EGD); Surgeon: Jeff Rodriguez MD;  Location: [facility]; Service:        Meds/Allergies:  Prior to Admission medications    Medication Sig Start Date End Date Taking?  Authorizing Provider   amLODIPine (NORVASC) 10 mg tablet Take 1 tablet (10 mg total) by mouth daily 8/17/18  Yes Martha Harding MD   atorvastatin (LIPITOR) 40 mg tablet Take 40 mg by mouth daily   Yes Historical Provider, MD   busPIRone (BUSPAR) 5 mg tablet Take 5 mg by mouth 2 (two) times a day 12/3/19  Yes Historical Provider, MD   citalopram (CeleXA) 40 mg tablet Take 40 mg by mouth daily   Yes Historical Provider, MD   dicyclomine (BENTYL) 20 mg tablet Take 20 mg by mouth 2 (two) times a day   Yes Historical Provider, MD   famotidine (PEPCID) 20 mg tablet Take 1 tablet (20 mg total) by mouth daily 8/16/18  Yes Martha Harding MD   gabapentin (NEURONTIN) 100 mg capsule Take 1 capsule (100 mg total) by mouth 2 (two) times a day 8/16/18  Yes Martha Harding MD   insulin glargine (BASAGLAR KWIKPEN) 100 units/mL injection pen Inject 14 Units under the skin daily before breakfast  Patient taking differently: Inject 12 Units under the skin daily before breakfast 8/16/18  Yes Martha Harding MD   Insulin Lispro (ADMELOG SC) Inject 5 Units under the skin 3 (three) times a day with meals   Yes Historical Provider, MD   JARDIANCE 10 MG TABS Take 10 mg by mouth daily  2/18/20  Yes Historical Provider, MD   levETIRAcetam (KEPPRA) 750 mg tablet Take 1 tablet (750 mg total) by mouth every 12 (twelve) hours  Patient taking differently: Take 500 mg by mouth every 12 (twelve) hours 7/5/20  Yes Danica Farley MD   metoclopramide (REGLAN) 10 mg tablet Take 1 tablet (10 mg total) by mouth every 6 (six) hours 10/9/20  Yes Nydia Lo DO   omeprazole (PriLOSEC) 40 MG capsule Take 40 mg by mouth daily   Yes Historical Provider, MD   rivaroxaban (XARELTO) 20 mg tablet Take 20 mg by mouth 1/10/20  Yes Historical Provider, MD ferrous sulfate 324 (65 Fe) mg Take 1 tablet (324 mg total) by mouth daily before breakfast  Patient not taking: Reported on 9/29/2020 2/1/20   Annabelle Méndez PA-C   mirtazapine (REMERON) 15 mg tablet Take 1 tablet (15 mg total) by mouth daily at bedtime  Patient not taking: Reported on 7/9/2023 8/16/18   Judy Lu MD     I have reviewed home medications with patient family member. Allergies: Allergies   Allergen Reactions   • Lisinopril Swelling   • Metformin GI Intolerance     GI sx- recommend d/c by GI       Social History:  Marital Status: Single   Occupation:   Patient Pre-hospital Living Situation: Home  Patient Pre-hospital Level of Mobility: walks  Patient Pre-hospital Diet Restrictions:   Substance Use History:   Social History     Substance and Sexual Activity   Alcohol Use Never     Social History     Tobacco Use   Smoking Status Never   Smokeless Tobacco Never     Social History     Substance and Sexual Activity   Drug Use Yes   • Types: Marijuana       Family History:  History reviewed. No pertinent family history. Physical Exam:     Vitals:   Blood Pressure: 136/90 (07/10/23 0157)  Pulse: 87 (07/10/23 0157)  Temperature: 98 °F (36.7 °C) (07/10/23 0155)  Temp Source: Oral (07/09/23 2124)  Respirations: 18 (07/10/23 0157)  SpO2: 100 % (07/10/23 0157)    Physical Exam  Vitals and nursing note reviewed. Constitutional:       General: He is not in acute distress. Appearance: He is underweight. HENT:      Head: Normocephalic and atraumatic. Mouth/Throat:      Mouth: Mucous membranes are dry. Eyes:      Conjunctiva/sclera: Conjunctivae normal.   Cardiovascular:      Rate and Rhythm: Normal rate and regular rhythm. Heart sounds: No murmur heard. Pulmonary:      Effort: Pulmonary effort is normal. No respiratory distress. Breath sounds: Normal breath sounds. Abdominal:      General: Abdomen is flat. Bowel sounds are normal. There is no distension.       Palpations: Abdomen is soft. Tenderness: There is abdominal tenderness (LLQ). Musculoskeletal:         General: No swelling. Cervical back: Neck supple. Skin:     General: Skin is warm and dry. Capillary Refill: Capillary refill takes 2 to 3 seconds. Neurological:      Mental Status: He is alert and oriented to person, place, and time. Psychiatric:         Mood and Affect: Mood normal.          Additional Data:     Lab Results:  Results from last 7 days   Lab Units 07/09/23  2222   WBC Thousand/uL 9.84   HEMOGLOBIN g/dL 12.2   HEMATOCRIT % 38.1   PLATELETS Thousands/uL 189   NEUTROS PCT % 85*   LYMPHS PCT % 9*   MONOS PCT % 6   EOS PCT % 0     Results from last 7 days   Lab Units 07/09/23  2222   SODIUM mmol/L 139   POTASSIUM mmol/L 3.9   CHLORIDE mmol/L 99   CO2 mmol/L 30   BUN mg/dL 24   CREATININE mg/dL 1.37*   ANION GAP mmol/L 10   CALCIUM mg/dL 10.0   ALBUMIN g/dL 4.3   TOTAL BILIRUBIN mg/dL 0.61   ALK PHOS U/L 132*   ALT U/L 99*   AST U/L 47*   GLUCOSE RANDOM mg/dL 184*     Results from last 7 days   Lab Units 07/09/23  2248   INR  0.98     Results from last 7 days   Lab Units 07/09/23  2121   POC GLUCOSE mg/dl 170*               Lines/Drains:  Invasive Devices     Peripheral Intravenous Line  Duration           Peripheral IV 07/09/23 Right Wrist <1 day                    Imaging: Reviewed radiology reports from this admission including: abdominal/pelvic CT and Personally reviewed the following imaging: abdominal/pelvic CT  CT abdomen pelvis with contrast   Final Result by Anoop North DO (07/10 0021)      Diffuse thickening of the colonic wall which may represent colitis. Follow-up with gastroenterology is recommended. Duodenal diverticulum versus peptic ulcer disease. Thickening of the urinary bladder wall which may represent cystitis. Follow-up with urology. Indeterminate renal lesions as described above.  Further evaluation on a nonemergent basis with a renal ultrasound or MRI of the kidneys with contrast on a nonemergent basis. The study was marked in [facility] for immediate notification. Workstation performed: [id number]             EKG and Other Studies Reviewed on Admission:   · EKG: NSR. HR 81.    ** Please Note: This note has been constructed using a voice recognition system.  **

## 2023-07-10 NOTE — PLAN OF CARE
Problem: PAIN - ADULT  Goal: Verbalizes/displays adequate comfort level or baseline comfort level  Description: Interventions:  - Encourage patient to monitor pain and request assistance  - Assess pain using appropriate pain scale  - Administer analgesics based on type and severity of pain and evaluate response  - Implement non-pharmacological measures as appropriate and evaluate response  - Consider cultural and social influences on pain and pain management  - Notify physician/advanced practitioner if interventions unsuccessful or patient reports new pain  Outcome: Progressing     Problem: INFECTION - ADULT  Goal: Absence or prevention of progression during hospitalization  Description: INTERVENTIONS:  - Assess and monitor for signs and symptoms of infection  - Monitor lab/diagnostic results  - Monitor all insertion sites, i.e. indwelling lines, tubes, and drains  - Monitor endotracheal if appropriate and nasal secretions for changes in amount and color  - [place] appropriate cooling/warming therapies per order  - Administer medications as ordered  - Instruct and encourage patient and family to use good hand hygiene technique  - Identify and instruct in appropriate isolation precautions for identified infection/condition  Outcome: Progressing     Problem: SAFETY ADULT  Goal: Patient will remain free of falls  Description: INTERVENTIONS:  - Educate patient/family on patient safety including physical limitations  - Instruct patient to call for assistance with activity   - Consult OT/PT to assist with strengthening/mobility   - Keep Call bell within reach  - Keep bed low and locked with side rails adjusted as appropriate  - Keep care items and personal belongings within reach  - Initiate and maintain comfort rounds  - Make Fall Risk Sign visible to staff  - Offer Toileting every 2 Hours, in advance of need  - Initiate/Maintain bed alarm  - Obtain necessary fall risk management equipment: bed alarm  - Apply yellow socks and bracelet for high fall risk patients  - Consider moving patient to room near nurses station  Outcome: Progressing  Goal: Maintain or return to baseline ADL function  Description: INTERVENTIONS:  -  Assess patient's ability to carry out ADLs; assess patient's baseline for ADL function and identify physical deficits which impact ability to perform ADLs (bathing, care of mouth/teeth, toileting, grooming, dressing, etc.)  - Assess/evaluate cause of self-care deficits   - Assess range of motion  - Assess patient's mobility; develop plan if impaired  - Assess patient's need for assistive devices and provide as appropriate  - Encourage maximum independence but intervene and supervise when necessary  - Involve family in performance of ADLs  - Assess for home care needs following discharge   - Consider OT consult to assist with ADL evaluation and planning for discharge  - Provide patient education as appropriate  Outcome: Progressing     Problem: DISCHARGE PLANNING  Goal: Discharge to home or other facility with appropriate resources  Description: INTERVENTIONS:  - Identify barriers to discharge w/patient and caregiver  - Arrange for needed discharge resources and transportation as appropriate  - Identify discharge learning needs (meds, wound care, etc.)  - Arrange for interpretive services to assist at discharge as needed  - Refer to Case Management Department for coordinating discharge planning if the patient needs post-hospital services based on physician/advanced practitioner order or complex needs related to functional status, cognitive ability, or social support system  Outcome: Progressing     Problem: Knowledge Deficit  Goal: Patient/family/caregiver demonstrates understanding of disease process, treatment plan, medications, and discharge instructions  Description: Complete learning assessment and assess knowledge base.   Interventions:  - Provide teaching at level of understanding  - Provide teaching via preferred learning methods  Outcome: Progressing     Problem: NEUROSENSORY - ADULT  Goal: Remains free of injury related to seizures activity  Description: INTERVENTIONS  - Maintain airway, patient safety  and administer oxygen as ordered  - Monitor patient for seizure activity, document and report duration and description of seizure to physician/advanced practitioner  - If seizure occurs,  ensure patient safety during seizure  - Reorient patient post seizure  - Seizure pads on all 4 side rails  - Instruct patient/family to notify RN of any seizure activity including if an aura is experienced  - Instruct patient/family to call for assistance with activity based on nursing assessment  - Administer anti-seizure medications if ordered    Outcome: Progressing  Goal: Achieves maximal functionality and self care  Description: INTERVENTIONS  - Monitor swallowing and airway patency with patient fatigue and changes in neurological status  - Encourage and assist patient to increase activity and self care.    - Encourage visually impaired, hearing impaired and aphasic patients to use assistive/communication devices  Outcome: Progressing     Problem: CARDIOVASCULAR - ADULT  Goal: Maintains optimal cardiac output and hemodynamic stability  Description: INTERVENTIONS:  - Monitor I/O, vital signs and rhythm  - Monitor for S/S and trends of decreased cardiac output  - Administer and titrate ordered vasoactive medications to optimize hemodynamic stability  - Assess quality of pulses, skin color and temperature  - Assess for signs of decreased coronary artery perfusion  - Instruct patient to report change in severity of symptoms  Outcome: Progressing     Problem: RESPIRATORY - ADULT  Goal: Achieves optimal ventilation and oxygenation  Description: INTERVENTIONS:  - Assess for changes in respiratory status  - Assess for changes in mentation and behavior  - Position to facilitate oxygenation and minimize respiratory effort  - Oxygen administered by appropriate delivery if ordered  - Initiate smoking cessation education as indicated  - Encourage broncho-pulmonary hygiene including cough, deep breathe, Incentive Spirometry  - Assess the need for suctioning and aspirate as needed  - Assess and instruct to report SOB or any respiratory difficulty  - Respiratory Therapy support as indicated  Outcome: Progressing     Problem: GASTROINTESTINAL - ADULT  Goal: Minimal or absence of nausea and/or vomiting  Description: INTERVENTIONS:  - Administer IV fluids if ordered to ensure adequate hydration  - Maintain NPO status until nausea and vomiting are resolved  - Nasogastric tube if ordered  - Administer ordered antiemetic medications as needed  - Provide nonpharmacologic comfort measures as appropriate  - Advance diet as tolerated, if ordered  - Consider nutrition services referral to assist patient with adequate nutrition and appropriate food choices  Outcome: Progressing  Goal: Maintains or returns to baseline bowel function  Description: INTERVENTIONS:  - Assess bowel function  - Encourage oral fluids to ensure adequate hydration  - Administer IV fluids if ordered to ensure adequate hydration  - Administer ordered medications as needed  - Encourage mobilization and activity  - Consider nutritional services referral to assist patient with adequate nutrition and appropriate food choices  Outcome: Progressing  Goal: Maintains adequate nutritional intake  Description: INTERVENTIONS:  - Monitor percentage of each meal consumed  - Identify factors contributing to decreased intake, treat as appropriate  - Assist with meals as needed  - Monitor I&O, weight, and lab values if indicated  - Obtain nutrition services referral as needed  Outcome: Progressing  Goal: Oral mucous membranes remain intact  Description: INTERVENTIONS  - Assess oral mucosa and hygiene practices  - Implement preventative oral hygiene regimen  - Implement oral medicated treatments as ordered  - Initiate Nutrition services referral as needed  Outcome: Progressing     Problem: METABOLIC, FLUID AND ELECTROLYTES - ADULT  Goal: Electrolytes maintained within normal limits  Description: INTERVENTIONS:  - Monitor labs and assess patient for signs and symptoms of electrolyte imbalances  - Administer electrolyte replacement as ordered  - Monitor response to electrolyte replacements, including repeat lab results as appropriate  - Instruct patient on fluid and nutrition as appropriate  Outcome: Progressing  Goal: Fluid balance maintained  Description: INTERVENTIONS:  - Monitor labs   - Monitor I/O and WT  - Instruct patient on fluid and nutrition as appropriate  - Assess for signs & symptoms of volume excess or deficit  Outcome: Progressing     Problem: SKIN/TISSUE INTEGRITY - ADULT  Goal: Skin Integrity remains intact(Skin Breakdown Prevention)  Description: Assess:  -Perform Kel assessment every shift and prn  -Clean and moisturize skin BID  -Inspect skin when repositioning, toileting, and assisting with ADLS  -Assess under medical devices   -Assess extremities for adequate circulation and sensation     Bed Management:  -Have minimal linens on bed & keep smooth, unwrinkled  -Change linens as needed when moist or perspiring  -Avoid sitting or lying in one position for more than 2 hours while in bed  -Keep HOB at 30 degrees     Toileting:  -Offer bedside commode  -Assess for incontinence   -Use incontinent care products after each incontinent episode     Activity:  -Mobilize patient 3 times a day  -Encourage activity and walks on unit  -Encourage or provide ROM exercises   -Turn and reposition patient every 2 Hours  -Use appropriate equipment to lift or move patient in bed  -Instruct/ Assist with weight shifting every 2 hours when out of bed in chair  -Consider limitation of chair time 2 hour intervals    Skin Care:  -Avoid use of baby powder, tape, friction and shearing, hot water or constrictive clothing  -Relieve pressure over bony prominences  -Do not massage red bony areas    Next Steps:  -Teach patient strategies to minimize risks   -Consider consults to  interdisciplinary teams  Outcome: Progressing  Goal: Incision(s), wounds(s) or drain site(s) healing without S/S of infection  Description: INTERVENTIONS  - Assess and document dressing, incision, wound bed, drain sites and surrounding tissue  - Provide patient and family education  - Perform skin care/dressing changes every shift and prn  Outcome: Progressing     Problem: HEMATOLOGIC - ADULT  Goal: Maintains hematologic stability  Description: INTERVENTIONS  - Assess for signs and symptoms of bleeding or hemorrhage  - Monitor labs  - Administer supportive blood products/factors as ordered and appropriate  Outcome: Progressing     Problem: MUSCULOSKELETAL - ADULT  Goal: Maintain or return mobility to safest level of function  Description: INTERVENTIONS:  - Assess patient's ability to carry out ADLs; assess patient's baseline for ADL function and identify physical deficits which impact ability to perform ADLs (bathing, care of mouth/teeth, toileting, grooming, dressing, etc.)  - Assess/evaluate cause of self-care deficits   - Assess range of motion  - Assess patient's mobility  - Assess patient's need for assistive devices and provide as appropriate  - Encourage maximum independence but intervene and supervise when necessary  - Involve family in performance of ADLs  - Assess for home care needs following discharge   - Consider OT consult to assist with ADL evaluation and planning for discharge  - Provide patient education as appropriate  Outcome: Progressing     Problem: Nutrition/Hydration-ADULT  Goal: Nutrient/Hydration intake appropriate for improving, restoring or maintaining nutritional needs  Description: Monitor and assess patient's nutrition/hydration status for malnutrition.  Collaborate with interdisciplinary team and initiate plan and interventions as ordered. Monitor patient's weight and dietary intake as ordered or per policy. Utilize nutrition screening tool and intervene as necessary. Determine patient's food preferences and provide high-protein, high-caloric foods as appropriate.      INTERVENTIONS:  - Monitor oral intake, urinary output, labs, and treatment plans  - Assess nutrition and hydration status and recommend course of action  - Evaluate amount of meals eaten  - Assist patient with eating if necessary   - Allow adequate time for meals  - Recommend/ encourage appropriate diets, oral nutritional supplements, and vitamin/mineral supplements  - Order, calculate, and assess calorie counts as needed  - Recommend, monitor, and adjust tube feedings and TPN/PPN based on assessed needs  - Assess need for intravenous fluids  - Provide specific nutrition/hydration education as appropriate  - Include patient/family/caregiver in decisions related to nutrition  Outcome: Progressing

## 2023-07-10 NOTE — ASSESSMENT & PLAN NOTE
Reports 2 day history of generalized L sided abdominal pain, nausea/non-bloody, non-bilious vomiting, multiple episodes of watery diarrhea non-bloody/melena, denies fevers/chills, no documented fever in ED, no recent travel, no known sick contacts, no known foodborne exposure   No leukocytosis  CT ABD/pelvis reviewed with diffuse thickening of colonic wall, duodenal diverticulum vs peptic ulcer disease, thickening of urinary bladder, indeterminate renal lesions     Plan:  • Infectious / inflammatory etiology, appeared well on exam, bowel soft  • Check viral enteritis / c diff panel   • Hold antibiotics unless febrile, worsening leukocytosis

## 2023-07-10 NOTE — ASSESSMENT & PLAN NOTE
Lab Results   Component Value Date    HGBA1C 9.4 (H) 05/17/2021     Plan:  • BG goal 140-200 while inpatient, ACHS sliding scale, Long acting: lantus 12u AM  • PTA regimen: jardiance, lantus, lispro

## 2023-07-10 NOTE — ASSESSMENT & PLAN NOTE
Lab Results   Component Value Date    EGFR 56 07/09/2023    EGFR 93 07/22/2021    EGFR 69 10/22/2020    CREATININE 1.37 (H) 07/09/2023    CREATININE 1.03 07/22/2021    CREATININE 1.33 (H) 10/22/2020     In the setting of poorly controlled hypertensive/diabetic nephropathy   Baseline Cr: 1.3    Plan:  • Admit Cr: 1.37 > trend  • IVF: 1L NS in ED, continue isolyte   • Monitor renal function, renal dose medications, maintain normotension/euvolemia, avoid nephrotoxic agents, I&Os

## 2023-07-10 NOTE — ASSESSMENT & PLAN NOTE
Hx of the same, denies alcohol use  07/05/2023 MRI abd reviewed: with stable hemagiomas, no biliary obstruction     Plan:  • Trend in AM labs

## 2023-07-10 NOTE — ASSESSMENT & PLAN NOTE
Currently following Urology at [facility], pending appointment tomorrow regarding treatment options   07/05/2023 MRI abd reviewed: stable anterior mid pole lesion, complex lesions within bilateral kidneys, stable benign hepatic hemangiomas

## 2023-07-10 NOTE — PLAN OF CARE
Problem: PHYSICAL THERAPY ADULT  Goal: Performs mobility at highest level of function for planned discharge setting. See evaluation for individualized goals. Description: Treatment/Interventions: Functional transfer training, LE strengthening/ROM, Therapeutic exercise, Endurance training, Patient/family training, Equipment eval/education, Bed mobility, Gait training, Elevations, Continued evaluation, Spoke to nursing, Spoke to case management, Spoke to MD, OT  Equipment Recommended: Maricarmen Tejeda       See flowsheet documentation for full assessment, interventions and recommendations. Note: Prognosis: Good  Problem List: Decreased strength, Decreased endurance, Impaired balance, Decreased mobility, Decreased safety awareness, Impaired judgement  Assessment: Alberto Brown is a 59 y.o. male admitted to [address] [place] [address] on 7/9/2023 for Colitis. PT was consulted and pt was seen on 7/10/2023 for mobility assessment and d/c planning. Pt presents with increased risk for falls, multiple lines, and contact percautions. Pt is currently functioning at a supervision assistance x1 level for bed mobility, supervision assistance x1 level for transfers, and minimum assistance x1 level for ambulation with [address]. Pt demonstrated impaired balance, gait deviations, and activity intolerance upon initial evaluation. Pt reported sensation of room spinning at the beginning and throughout session. Complains of unsteadiness due to symptoms. Explained benefits of RW for balance, but pt declined use for IE. Ambulated with SPC and Al x1, but pt had to take rest breaks and use room furniture for increased stability. Functional BP throughout session despite dizziness. Denies concerns for d/c to home but states he wishes to get better before. At baseline, pt was independent with all functional mobility with use of SPC in the community, ADLs, and IADLs with transportation provided by his daughter.   Pt will benefit from continued skilled IP PT to address the above mentioned impairments  in order to maximize recovery and increase functional independence when completing mobility and ADLs. Currently PT recommendations for DME include RW. At this time PT recommendations for d/c are home with HHPT vs OPPT if issues w balance persisit, pending continued progress and transportation needs. Barriers to Discharge: Decreased caregiver support  Barriers to Discharge Comments: lives alone; relies on daughter for transport  PT Discharge Recommendation: Home with home health rehabilitation    See flowsheet documentation for full assessment.

## 2023-07-10 NOTE — NURSING NOTE
Discharge instructions reviewed with patient and daughter at bedside, questions answered. Discharge to home with outpatient follow-up.

## 2023-07-10 NOTE — ED PROVIDER NOTES
History  Chief Complaint   Patient presents with   • Vomiting     Pt c/o vomiting and high BS for the past 2 days. 58 y/o male with n/v/d for the past 2-3 days.  +abd. Pain, no fevers, no urinary symptoms. Pt. Has a hx of renal carcinoma          Prior to Admission Medications   Prescriptions Last Dose Informant Patient Reported? Taking?    Insulin Lispro (ADMELOG SC)   Yes Yes   Sig: Inject 5 Units under the skin 3 (three) times a day with meals   JARDIANCE 10 MG TABS  Self Yes Yes   Sig: Take 10 mg by mouth daily    amLODIPine (NORVASC) 10 mg tablet  Self No Yes   Sig: Take 1 tablet (10 mg total) by mouth daily   atorvastatin (LIPITOR) 40 mg tablet   Yes Yes   Sig: Take 40 mg by mouth daily   busPIRone (BUSPAR) 5 mg tablet  Self Yes Yes   Sig: Take 5 mg by mouth 2 (two) times a day   citalopram (CeleXA) 40 mg tablet  Self Yes Yes   Sig: Take 40 mg by mouth daily   dicyclomine (BENTYL) 20 mg tablet   Yes Yes   Sig: Take 20 mg by mouth 2 (two) times a day   famotidine (PEPCID) 20 mg tablet  Self No Yes   Sig: Take 1 tablet (20 mg total) by mouth daily   ferrous sulfate 324 (65 Fe) mg Not Taking Self No No   Sig: Take 1 tablet (324 mg total) by mouth daily before breakfast   Patient not taking: Reported on 9/29/2020   gabapentin (NEURONTIN) 100 mg capsule  Self No Yes   Sig: Take 1 capsule (100 mg total) by mouth 2 (two) times a day   insulin glargine (BASAGLAR KWIKPEN) 100 units/mL injection pen  Self No Yes   Sig: Inject 14 Units under the skin daily before breakfast   Patient taking differently: Inject 12 Units under the skin daily before breakfast   insulin glargine (LANTUS SOLOSTAR) 100 units/mL injection pen   Yes Yes   Sig: Inject 12 Units under the skin daily before breakfast   levETIRAcetam (KEPPRA) 500 mg tablet   Yes Yes   Sig: Take 1 tablet (500 mg total) by mouth every 12 (twelve) hours   levETIRAcetam (KEPPRA) 750 mg tablet   No Yes   Sig: Take 1 tablet (750 mg total) by mouth every 12 (twelve) hours   Patient taking differently: Take 500 mg by mouth every 12 (twelve) hours   metoclopramide (REGLAN) 10 mg tablet  Self No Yes   Sig: Take 1 tablet (10 mg total) by mouth every 6 (six) hours   Patient not taking: Reported on 7/11/2023   mirtazapine (REMERON) 15 mg tablet Not Taking Self No No   Sig: Take 1 tablet (15 mg total) by mouth daily at bedtime   Patient not taking: Reported on 7/9/2023   omeprazole (PriLOSEC) 40 MG capsule   Yes Yes   Sig: Take 40 mg by mouth daily   rivaroxaban (XARELTO) 20 mg tablet  Self Yes Yes   Sig: Take 20 mg by mouth      Facility-Administered Medications: None       Past Medical History:   Diagnosis Date   • Anxiety    • Depression    • Diabetes mellitus ([address])    • Esophagogastric ring    • GERD (gastroesophageal reflux disease)    • Hepatic hemangioma    • History of kidney cancer    • Hx of urinary infection    • Hyperlipidemia    • Hypertension    • Migraine    • Pancreatitis    • PE (pulmonary thromboembolism) ([address])    • Psychiatric disorder    • Seizures ([address])        Past Surgical History:   Procedure Laterality Date   • CHOLECYSTECTOMY     • COLONOSCOPY  01/2020    0.8 mm sessile polyp removed adenoma, bxs negative - done by Dr Mcgrath   • COLONOSCOPY W/ POLYPECTOMY  12/2019    5mm sigmoid polyp, adenoma - Dr Alba Hayden   • CT CYSTOGRAM  1/28/2020   • EGD  04/2013    chronic antral gastritis w/ intestinal metaplasia   • ESOPHAGOGASTRODUODENOSCOPY N/A 9/7/2016    Procedure: ESOPHAGOGASTRODUODENOSCOPY (EGD); Surgeon: Sarah Garrison MD;  Location: [facility]; Service:        History reviewed. No pertinent family history. I have reviewed and agree with the history as documented.     E-Cigarette/Vaping   • E-Cigarette Use Never User      E-Cigarette/Vaping Substances   • Nicotine No    • THC No    • CBD No    • Flavoring No    • Other No    • Unknown No      Social History     Tobacco Use   • Smoking status: Never   • Smokeless tobacco: Never   Vaping Use   • Vaping Use: Never used   Substance Use Topics   • Alcohol use: Never   • Drug use: Yes     Types: Marijuana       Review of Systems   Constitutional: Negative for appetite change, fatigue and fever. HENT: Negative for rhinorrhea and sore throat. Eyes: Negative for pain. Respiratory: Negative for cough, shortness of breath and wheezing. Cardiovascular: Negative for chest pain and leg swelling. Gastrointestinal: Positive for abdominal pain, diarrhea, nausea and vomiting. Genitourinary: Negative for dysuria and flank pain. Musculoskeletal: Negative for back pain and neck pain. Skin: Negative for rash. Neurological: Negative for syncope and headaches. Psychiatric/Behavioral:        Mood normal       Physical Exam  Physical Exam  Vitals and nursing note reviewed. Constitutional:       Appearance: He is well-developed. HENT:      Head: Normocephalic and atraumatic. Right Ear: External ear normal.      Left Ear: External ear normal.   Eyes:      General: No scleral icterus. Extraocular Movements: Extraocular movements intact. Cardiovascular:      Rate and Rhythm: Normal rate and regular rhythm. Pulmonary:      Effort: Pulmonary effort is normal. No respiratory distress. Breath sounds: Normal breath sounds. Abdominal:      Palpations: Abdomen is soft. Comments: L mid abd. Tenderness, no r/g   Musculoskeletal:         General: No deformity or signs of injury. Normal range of motion. Cervical back: Normal range of motion and neck supple. Skin:     General: Skin is warm and dry. Coloration: Skin is not jaundiced or pale. Neurological:      General: No focal deficit present. Mental Status: He is alert and oriented to person, place, and time.    Psychiatric:         Mood and Affect: Mood normal.         Behavior: Behavior normal.         Vital Signs  ED Triage Vitals   Temperature Pulse Respirations Blood Pressure SpO2   07/09/23 2124 07/09/23 2117 07/09/23 2117 07/09/23 2117 07/09/23 2117   97.8 °F (36.6 °C) 75 18 (!) 183/77 100 %      Temp Source Heart Rate Source Patient Position - Orthostatic VS BP Location FiO2 (%)   07/09/23 2124 07/09/23 2117 07/09/23 2117 07/09/23 2117 --   Oral Monitor Sitting Left arm       Pain Score       07/09/23 2250       4           Vitals:    07/10/23 0711 07/10/23 0836 07/10/23 0838 07/10/23 1017   BP: 156/82 (!) 171/96 163/92 143/80   Pulse: 86 81 83 84   Patient Position - Orthostatic VS: Lying            Visual Acuity      ED Medications  Medications   sodium chloride 0.9 % bolus 1,000 mL (0 mL Intravenous Stopped 7/9/23 2322)   ondansetron (ZOFRAN) injection 4 mg (4 mg Intravenous Given 7/9/23 2236)   diphenoxylate-atropine (LOMOTIL) 2.5-0.025 mg per tablet 1 tablet (1 tablet Oral Given 7/9/23 2237)   iohexol (OMNIPAQUE) 350 MG/ML injection (SINGLE-DOSE) 100 mL (100 mL Intravenous Given 7/9/23 2342)       Diagnostic Studies  Results Reviewed     Procedure Component Value Units Date/Time    Hemoglobin A1C w/ EAG Estimation [[id number]]  (Abnormal) Collected: 07/09/23 2222    Lab Status: Final result Specimen: Blood from Arm, Right Updated: 07/11/23 1910     Hemoglobin A1C 8.1 %       mg/dl     Clostridium difficile toxin by PCR with EIA [[id number]]  (Normal) Collected: 07/10/23 1444    Lab Status: Final result Specimen: Stool from Rectum Updated: 07/11/23 1238     C.difficile toxin by PCR Negative    Stool Enteric Bacterial Panel by PCR [[id number]]  (Normal) Collected: 07/10/23 1444    Lab Status: Final result Specimen: Stool from Rectum Updated: 07/11/23 0508     Salmonella sp PCR None Detected     Shigella sp/Enteroinvasive E. coli (EIEC) PCR None Detected     Campylobacter sp (jejuni and coli) PCR None Detected     Shiga toxin 1/Shiga toxin 2 genes PCR None Detected    Fecal fat, qualitative [[id number]] Collected: 07/10/23 1444    Lab Status:  In process Specimen: Stool from Rectum Updated: 07/10/23 1515    T4, free [[id number]]  (Normal) Collected: 07/09/23 2222    Lab Status: Final result Specimen: Blood from Arm, Right Updated: 07/10/23 1247     Free T4 1.01 ng/dL     Procalcitonin [[id number]]  (Normal) Collected: 07/09/23 2222    Lab Status: Final result Specimen: Blood from Arm, Right Updated: 07/10/23 0342     Procalcitonin 0.21 ng/ml     TSH, 3rd generation with Free T4 reflex [[id number]]  (Abnormal) Collected: 07/09/23 2222    Lab Status: Final result Specimen: Blood from Arm, Right Updated: 07/10/23 0342     TSH 3RD GENERATON 6.565 uIU/mL     Urine Microscopic [[id number]]  (Abnormal) Collected: 07/10/23 0119    Lab Status: Final result Specimen: Urine, Clean Catch Updated: 07/10/23 0137     RBC, UA 4-10 /hpf      WBC, UA 4-10 /hpf      Epithelial Cells Occasional /hpf      Bacteria, UA None Seen /hpf      MUCUS THREADS Occasional    Urine Macroscopic, POC [[id number]]  (Abnormal) Collected: 07/10/23 0119    Lab Status: Final result Specimen: Urine Updated: 07/10/23 0121     Color, UA Yellow     Clarity, UA Clear     pH, UA 5.0     Leukocytes, UA Negative     Nitrite, UA Negative     Protein, UA >=300 mg/dl      Glucose,  (1/2%) mg/dl      Ketones, UA Negative mg/dl      Urobilinogen, UA 0.2 E.U./dl      Bilirubin, UA Negative     Occult Blood, UA Moderate     Specific Gravity, UA 1.015    Narrative:      CLINITEK RESULT    HS Troponin I 2hr [[id number]]  (Normal) Collected: 07/10/23 0033    Lab Status: Final result Specimen: Blood from Arm, Right Updated: 07/10/23 0111     hs TnI 2hr 9 ng/L      Delta 2hr hsTnI -1 ng/L     Protime-INR [[id number]]  (Normal) Collected: 07/09/23 2248    Lab Status: Final result Specimen: Blood from Arm, Left Updated: 07/09/23 2306     Protime 13.0 seconds      INR 0.98    APTT [[id number]]  (Normal) Collected: 07/09/23 2248    Lab Status: Final result Specimen: Blood from Arm, Left Updated: 07/09/23 2306     PTT 23 seconds     HS Troponin 0hr (reflex protocol) [[id number]]  (Normal) Collected: 07/09/23 2222 Lab Status: Final result Specimen: Blood from Arm, Right Updated: 07/09/23 2249     hs TnI 0hr 10 ng/L     Comprehensive metabolic panel [[id number]]  (Abnormal) Collected: 07/09/23 2222    Lab Status: Final result Specimen: Blood from Arm, Right Updated: 07/09/23 2246     Sodium 139 mmol/L      Potassium 3.9 mmol/L      Chloride 99 mmol/L      CO2 30 mmol/L      ANION GAP 10 mmol/L      BUN 24 mg/dL      Creatinine 1.37 mg/dL      Glucose 184 mg/dL      Calcium 10.0 mg/dL      AST 47 U/L      ALT 99 U/L      Alkaline Phosphatase 132 U/L      Total Protein 8.6 g/dL      Albumin 4.3 g/dL      Total Bilirubin 0.61 mg/dL      eGFR 56 ml/min/1.73sq m     Narrative:      [facility] guidelines for Chronic Kidney Disease (CKD):   •  Stage 1 with normal or high GFR (GFR > 90 mL/min/1.73 square meters)  •  Stage 2 Mild CKD (GFR = 60-89 mL/min/1.73 square meters)  •  Stage 3A Moderate CKD (GFR = 45-59 mL/min/1.73 square meters)  •  Stage 3B Moderate CKD (GFR = 30-44 mL/min/1.73 square meters)  •  Stage 4 Severe CKD (GFR = 15-29 mL/min/1.73 square meters)  •  Stage 5 End Stage CKD (GFR <15 mL/min/1.73 square meters)  Note: GFR calculation is accurate only with a steady state creatinine    Lipase [[id number]]  (Abnormal) Collected: 07/09/23 2222    Lab Status: Final result Specimen: Blood from Arm, Right Updated: 07/09/23 2246     Lipase 294 u/L     CBC and differential [[id number]]  (Abnormal) Collected: 07/09/23 2222    Lab Status: Final result Specimen: Blood from Arm, Right Updated: 07/09/23 2227     WBC 9.84 Thousand/uL      RBC 4.01 Million/uL      Hemoglobin 12.2 g/dL      Hematocrit 38.1 %      MCV 95 fL      MCH 30.4 pg      MCHC 32.0 g/dL      RDW 15.2 %      MPV 11.2 fL      Platelets 790 Thousands/uL      nRBC 0 /100 WBCs      Neutrophils Relative 85 %      Immat GRANS % 0 %      Lymphocytes Relative 9 %      Monocytes Relative 6 %      Eosinophils Relative 0 %      Basophils Relative 0 % Neutrophils Absolute 8.26 Thousands/µL      Immature Grans Absolute 0.03 Thousand/uL      Lymphocytes Absolute 0.91 Thousands/µL      Monocytes Absolute 0.59 Thousand/µL      Eosinophils Absolute 0.01 Thousand/µL      Basophils Absolute 0.04 Thousands/µL     Fingerstick Glucose (POCT) [[id number]]  (Abnormal) Collected: 07/09/23 2121    Lab Status: Final result Updated: 07/09/23 2122     POC Glucose 170 mg/dl                  CT abdomen pelvis with contrast   Final Result by Ebony Callahan DO (07/10 0021)      Diffuse thickening of the colonic wall which may represent colitis. Follow-up with gastroenterology is recommended. Duodenal diverticulum versus peptic ulcer disease. Thickening of the urinary bladder wall which may represent cystitis. Follow-up with urology. Indeterminate renal lesions as described above. Further evaluation on a nonemergent basis with a renal ultrasound or MRI of the kidneys with contrast on a nonemergent basis. The study was marked in [facility] for immediate notification. Workstation performed: [id number]                    Procedures  Procedures         ED Course                               SBIRT 20yo+    Flowsheet Row Most Recent Value   Initial Alcohol Screen: US AUDIT-C     1. How often do you have a drink containing alcohol? 0 Filed at: 07/09/2023 2125   2. How many drinks containing alcohol do you have on a typical day you are drinking? 0 Filed at: 07/09/2023 2125   3a. Male UNDER 65: How often do you have five or more drinks on one occasion? 0 Filed at: 07/09/2023 2125   Audit-C Score 0 Filed at: 07/09/2023 2125   CRAIG: How many times in the past year have you. .. Used an illegal drug or used a prescription medication for non-medical reasons? Never Filed at: 07/09/2023 2125                    Medical Decision Making  I went over labs and ct findings with pt.     He feels weak - admitted to hospitalist for further workup/management    Amount and/or Complexity of Data Reviewed  Labs: ordered. Radiology: ordered. Risk  Prescription drug management. Decision regarding hospitalization.           Disposition  Final diagnoses:   Colitis     Time reflects when diagnosis was documented in both MDM as applicable and the Disposition within this note     Time User Action Codes Description Comment    7/10/2023 12:53 AM Pennye Beau R Add [K52.9] Gastroenteritis     7/10/2023 12:54 AM Pennye Beau R Add [K52.9] Colitis     7/10/2023 12:54 AM Pennye Beau Modify [K52.9] Colitis     7/10/2023 12:54 AM Kit Lucas R Remove [K52.9] Gastroenteritis     7/10/2023  1:13 PM Starlette Cleverly Add [R26.2] Ambulatory dysfunction     7/10/2023  1:13 PM Starlette Cleverly Add [G93.40] Acute encephalopathy       ED Disposition     ED Disposition   Admit    Condition   Stable    Date/Time   Mon Jul 10, 2023 12:53 AM    Comment   Case was discussed with OLIVIER  and the patient's admission status was agreed to be [email or web address]           Follow-up Information     Follow up With Specialties Details Why Contact [facility] MD [facility] Schedule an appointment as soon as possible for a visit in 1 week(s)  [facility] [phone] [address] [place], [place] Physician Assistant Follow up today  [address] [place] [phone]  [facility] Schedule an appointment as soon as possible for a visit in 2 week(s) Follow-up with GI for colitis 2-4 weeks [address]  [place] [address] [place] [id number]  [facility], [address] [address], [facility], [place], [address] [phone]    [facility]  Call in 2 day(s)  [address] [place] [address]  [phone]             Discharge Medication List as of 7/10/2023  1:26 PM      START taking these medications    Details   cephalexin (KEFLEX) 500 mg capsule Take 1 capsule (500 mg total) by mouth every 8 (eight) hours for 5 days, Starting Mon 7/10/2023, Until Sat 7/15/2023, Normal      metroNIDAZOLE (FLAGYL) 500 mg tablet Take 1 tablet (500 mg total) by mouth every 8 (eight) hours for 5 days, Starting Mon 7/10/2023, Until Sat 7/15/2023, Normal         CONTINUE these medications which have CHANGED    Details   insulin glargine (LANTUS SOLOSTAR) 100 units/mL injection pen Inject 12 Units under the skin daily before breakfast, Starting Mon 7/10/2023, Historical Med      levETIRAcetam (KEPPRA) 500 mg tablet Take 1 tablet (500 mg total) by mouth every 12 (twelve) hours, Starting Mon 7/10/2023, Historical Med         CONTINUE these medications which have NOT CHANGED    Details   amLODIPine (NORVASC) 10 mg tablet Take 1 tablet (10 mg total) by mouth daily, Starting Fri 8/17/2018, Normal      atorvastatin (LIPITOR) 40 mg tablet Take 40 mg by mouth daily, Historical Med      busPIRone (BUSPAR) 5 mg tablet Take 5 mg by mouth 2 (two) times a day, Starting Tue 12/3/2019, Historical Med      citalopram (CeleXA) 40 mg tablet Take 40 mg by mouth daily, Historical Med      dicyclomine (BENTYL) 20 mg tablet Take 20 mg by mouth 2 (two) times a day, Historical Med      famotidine (PEPCID) 20 mg tablet Take 1 tablet (20 mg total) by mouth daily, Starting Thu 8/16/2018, Normal      gabapentin (NEURONTIN) 100 mg capsule Take 1 capsule (100 mg total) by mouth 2 (two) times a day, Starting Thu 8/16/2018, Normal      Insulin Lispro (ADMELOG SC) Inject 5 Units under the skin 3 (three) times a day with meals, Historical Med      JARDIANCE 10 MG TABS Take 10 mg by mouth daily , Starting Tue 2/18/2020, Historical Med      metoclopramide (REGLAN) 10 mg tablet Take 1 tablet (10 mg total) by mouth every 6 (six) hours, Starting Fri 10/9/2020, Print      omeprazole (PriLOSEC) 40 MG capsule Take 40 mg by mouth daily, Historical Med      rivaroxaban (XARELTO) 20 mg tablet Take 20 mg by mouth, Starting Fri 1/10/2020, Historical Med         STOP taking these medications       ferrous sulfate 324 (65 Fe) mg Comments:   Reason for Stopping:         mirtazapine (REMERON) 15 mg tablet Comments:   Reason for Stopping:                   PDMP Review       Value Time User    PDMP Reviewed  Yes 7/10/2023  1:06 AM Jacqui Dobson PA-C          ED Provider  Electronically Signed by           Hallie Olivera MD  07/12/23 2445

## 2023-07-10 NOTE — ASSESSMENT & PLAN NOTE
Lab Results   Component Value Date    HGBA1C 9.4 (H) 05/17/2021     Results from last 7 days   Lab Units 07/10/23  1047 07/10/23  0713 07/09/23  2121   POC GLUCOSE mg/dl 118 107 170*     • Did have low sugars from poor intake during hospitalization.   • Continue home regimen

## 2023-07-10 NOTE — CASE MANAGEMENT
Case Management Discharge Planning Note    Patient name Alberto Brown  [facility]  /E5  [id number]-* MRN [id number]  : 1965 Date 7/10/2023       Current Admission Date: 2023  Current Admission Diagnosis:Colitis   Patient Active Problem List    Diagnosis Date Noted   • Colitis 07/10/2023   • Renal mass 07/10/2023   • Gastroparesis 2021   • Transaminitis 2020   • Chronic anticoagulation 2020   • GERD (gastroesophageal reflux disease)    • Type 2 diabetes mellitus with hyperglycemia, without long-term current use of insulin ([address]) 08/10/2018   • Ambulatory dysfunction 08/10/2018   • Elevated lipase 08/10/2018   • Other hyperlipidemia 08/10/2018   • Stage 3a chronic kidney disease ([address]) 2016   • Hypertension 2016      LOS (days): 0  Geometric Mean LOS (GMLOS) (days):   Days to GMLOS:     OBJECTIVE:     Current admission status: Observation   Preferred Pharmacy:   [facility] [place] - [address]  [address]  [address] [address]  Phone: [phone] Fax: [phone]    Primary Care Provider: Geno Au MD    Primary Insurance: [facility]  Secondary Insurance:     DISCHARGE DETAILS:    Discharge planning discussed with[de-identified] Patient  Freedom of Choice: Yes  Comments - Freedom of Choice: Patient would like to go home     Were Treatment Team discharge recommendations reviewed with patient/caregiver?: Yes  Did patient/caregiver verbalize understanding of patient care needs?: Yes  Were patient/caregiver advised of the risks associated with not following Treatment Team discharge recommendations?: Yes    Contacts  Patient Contacts: Daughter Jake Gates [phone]  Relationship to Patient[de-identified] Family  Contact Method: Phone  Reason/Outcome: Continuity of Care, Emergency Contact, Discharge  [facility]         Is the patient interested in [facility] at discharge?: Yes  [facility] requested[de-identified] [address] Agency Name[de-identified] [facility] External Referral Reason (only applicable if external HHA name selected): Patient has established relationship with provider  [facility] Provider[de-identified] PCP  Home Health Services Needed[de-identified] Strengthening/Theraputic Exercises to Improve Function, Gait/ADL Training, Evaluate Functional Status and Safety  Homebound Criteria Met[de-identified] Requires the Assistance of Another Person for Safe Ambulation or to Leave the Home, Uses an Assist Device (i.e. cane, walker, etc)  Supporting Clincal Findings[de-identified] Limited Endurance, Fatigues Easliy in [employer]    Treatment Team Recommendation: Home with [address] [facility]    Additional Comments: CM spoke with patient. He has home health aides come to his house but neither him nor his daughter remembers who the home health agency is. CM sent a referral to [facility] for home health. Patient is agreeable to this. He states he has an important urology appointment to go to and would like to leave today. Daughter is able to pick patient up.

## 2023-07-11 ENCOUNTER — HOME CARE VISIT (OUTPATIENT)
Dept: HOME HEALTH SERVICES | Facility: HOME HEALTHCARE | Age: 58
End: 2023-07-11
Payer: COMMERCIAL

## 2023-07-11 VITALS — SYSTOLIC BLOOD PRESSURE: 124 MMHG | DIASTOLIC BLOOD PRESSURE: 64 MMHG

## 2023-07-11 LAB
C DIFF TOX GENS STL QL NAA+PROBE: NEGATIVE
CAMPYLOBACTER DNA SPEC NAA+PROBE: NORMAL
EST. AVERAGE GLUCOSE BLD GHB EST-MCNC: 186 MG/DL
HBA1C MFR BLD: 8.1 %
SALMONELLA DNA SPEC QL NAA+PROBE: NORMAL
SHIGA TOXIN STX GENE SPEC NAA+PROBE: NORMAL
SHIGELLA DNA SPEC QL NAA+PROBE: NORMAL

## 2023-07-11 PROCEDURE — 400013 VN SOC

## 2023-07-11 PROCEDURE — G0151 HHCP-SERV OF PT,EA 15 MIN: HCPCS

## 2023-07-11 PROCEDURE — G0180 MD CERTIFICATION HHA PATIENT: HCPCS | Performed by: INTERNAL MEDICINE

## 2023-07-12 ENCOUNTER — HOME CARE VISIT (OUTPATIENT)
Dept: HOME HEALTH SERVICES | Facility: HOME HEALTHCARE | Age: 58
End: 2023-07-12
Payer: COMMERCIAL

## 2023-07-12 VITALS — SYSTOLIC BLOOD PRESSURE: 130 MMHG | DIASTOLIC BLOOD PRESSURE: 60 MMHG

## 2023-07-12 PROCEDURE — G0152 HHCP-SERV OF OT,EA 15 MIN: HCPCS

## 2023-07-13 LAB
FAT STL QL: NORMAL
NEUTRAL FAT STL QL: NORMAL

## 2023-07-14 ENCOUNTER — HOME CARE VISIT (OUTPATIENT)
Dept: HOME HEALTH SERVICES | Facility: HOME HEALTHCARE | Age: 58
End: 2023-07-14
Payer: COMMERCIAL

## 2023-07-14 VITALS — SYSTOLIC BLOOD PRESSURE: 158 MMHG | DIASTOLIC BLOOD PRESSURE: 86 MMHG

## 2023-07-14 PROCEDURE — G0151 HHCP-SERV OF PT,EA 15 MIN: HCPCS

## 2023-07-19 ENCOUNTER — HOME CARE VISIT (OUTPATIENT)
Dept: HOME HEALTH SERVICES | Facility: HOME HEALTHCARE | Age: 58
End: 2023-07-19
Payer: COMMERCIAL

## 2023-07-20 ENCOUNTER — HOME CARE VISIT (OUTPATIENT)
Dept: HOME HEALTH SERVICES | Facility: HOME HEALTHCARE | Age: 58
End: 2023-07-20
Payer: COMMERCIAL

## 2023-07-21 ENCOUNTER — HOME CARE VISIT (OUTPATIENT)
Dept: HOME HEALTH SERVICES | Facility: HOME HEALTHCARE | Age: 58
End: 2023-07-21
Payer: COMMERCIAL

## 2023-07-24 ENCOUNTER — HOME CARE VISIT (OUTPATIENT)
Dept: HOME HEALTH SERVICES | Facility: HOME HEALTHCARE | Age: 58
End: 2023-07-24
Payer: COMMERCIAL

## 2023-12-27 ENCOUNTER — OFFICE VISIT (OUTPATIENT)
Dept: DENTISTRY | Facility: CLINIC | Age: 58
End: 2023-12-27

## 2023-12-27 VITALS — DIASTOLIC BLOOD PRESSURE: 76 MMHG | SYSTOLIC BLOOD PRESSURE: 204 MMHG

## 2023-12-27 DIAGNOSIS — Z01.21 ENCOUNTER FOR DENTAL EXAMINATION AND CLEANING WITH ABNORMAL FINDINGS: Primary | ICD-10-CM

## 2023-12-27 PROCEDURE — D0191 ASSESSMENT OF A PATIENT: HCPCS

## 2023-12-27 NOTE — DENTAL PROCEDURE DETAILS
Pt arrived at [place] [facility] office with ex wife for completion of PC and comprehensive exam.   BP was taken 204/76 las -pt admits to not taking insulin or eating anything today.   Reviewed medical history.   Discussed with Attending DR RODERICK argueta. treating pt. today. Advised ok to do comp and Pc.  Today.     Assessment    While seated in chair pt requested to use bathroom. After returning from bathroom he told us he did not feel well, we advised to check blood sugar levels-was 325-measured from port on his right arm    We decided it was best for pt. to post pone trt until he feels better, gets BP and blood sugar regulated. Pt advised to seek medical dr. or go to ED. Ex wife and pt. commented this happens all the time. I wrote down for him what his bp reading was for him to show his MD.     NV: Comp exam, PC  NVV: Impressions, trt plan
